# Patient Record
Sex: FEMALE | Race: WHITE | HISPANIC OR LATINO | Employment: OTHER | URBAN - METROPOLITAN AREA
[De-identification: names, ages, dates, MRNs, and addresses within clinical notes are randomized per-mention and may not be internally consistent; named-entity substitution may affect disease eponyms.]

---

## 2017-04-08 ENCOUNTER — HOSPITAL ENCOUNTER (EMERGENCY)
Facility: HOSPITAL | Age: 62
Discharge: HOME | End: 2017-04-08
Attending: EMERGENCY MEDICINE
Payer: OTHER GOVERNMENT

## 2017-04-08 VITALS
DIASTOLIC BLOOD PRESSURE: 83 MMHG | BODY MASS INDEX: 28.35 KG/M2 | SYSTOLIC BLOOD PRESSURE: 124 MMHG | TEMPERATURE: 98 F | HEIGHT: 63 IN | HEART RATE: 80 BPM | RESPIRATION RATE: 18 BRPM | WEIGHT: 160 LBS | OXYGEN SATURATION: 98 %

## 2017-04-08 DIAGNOSIS — T14.8XXA MUSCLE STRAIN: ICD-10-CM

## 2017-04-08 DIAGNOSIS — T14.8XXA ABRASION: Primary | ICD-10-CM
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
## 2017-04-08 DIAGNOSIS — S40.011A CONTUSION OF RIGHT SHOULDER, INITIAL ENCOUNTER: ICD-10-CM

## 2017-04-08 DIAGNOSIS — S09.90XA HEAD INJURY: ICD-10-CM

## 2017-04-08 PROCEDURE — 12011 RPR F/E/E/N/L/M 2.5 CM/<: CPT

## 2017-04-08 PROCEDURE — 96372 THER/PROPH/DIAG INJ SC/IM: CPT

## 2017-04-08 PROCEDURE — 63600175 PHARM REV CODE 636 W HCPCS: Performed by: EMERGENCY MEDICINE

## 2017-04-08 PROCEDURE — 25000003 PHARM REV CODE 250: Performed by: EMERGENCY MEDICINE

## 2017-04-08 PROCEDURE — 99284 EMERGENCY DEPT VISIT MOD MDM: CPT | Mod: 25

## 2017-04-08 RX ORDER — KETOROLAC TROMETHAMINE 30 MG/ML
30 INJECTION, SOLUTION INTRAMUSCULAR; INTRAVENOUS
Status: COMPLETED | OUTPATIENT
Start: 2017-04-08 | End: 2017-04-08

## 2017-04-08 RX ORDER — LIDOCAINE HYDROCHLORIDE AND EPINEPHRINE 10; 10 MG/ML; UG/ML
10 INJECTION, SOLUTION INFILTRATION; PERINEURAL
Status: DISCONTINUED | OUTPATIENT
Start: 2017-04-08 | End: 2017-04-08 | Stop reason: HOSPADM

## 2017-04-08 RX ORDER — LIDOCAINE AND PRILOCAINE 25; 25 MG/G; MG/G
CREAM TOPICAL
Status: DISCONTINUED | OUTPATIENT
Start: 2017-04-08 | End: 2017-04-08

## 2017-04-08 RX ORDER — CYCLOBENZAPRINE HCL 10 MG
10 TABLET ORAL 3 TIMES DAILY PRN
Qty: 12 TABLET | Refills: 0 | Status: SHIPPED | OUTPATIENT
Start: 2017-04-08 | End: 2017-04-13

## 2017-04-08 RX ADMIN — Medication 3 ML: at 01:04

## 2017-04-08 RX ADMIN — KETOROLAC TROMETHAMINE 30 MG: 30 INJECTION, SOLUTION INTRAMUSCULAR at 01:04

## 2017-04-08 NOTE — DISCHARGE INSTRUCTIONS
Contusiones (moretones)     Un moretón es ky hinchazón con alteración del color de la piel.   Ky contusión es un moretón. Un moretón se forma cuando solange recibe un golpe que no rompe la piel aubrie sí los vasos sanguíneos que están debajo. La aime que se derrama de los vasos rotos provoca enrojecimiento e hinchazón. A medida que ruby, el moretón probablemente se pondrá de varios colores, joseph juno, wendy y amarillo. Holt es normal. El moretón debería desaparecer en 2 o 3 semanas.  Factores que aumentan fernando probabilidades de tener moretones  A meghan todo el dwight le sale un moretón en algún momento, aunque ciertas personas son más propensas a tenerlos que otras. Ya que los vasos sanguíneos se vuelven más frágiles con el paso de la edad, las probabilidades de que salgan moretones aumentan con el envejecimiento. También es más probable que aparezcan moretones en personas que tienen un trastorno de la coagulación, joseph la hemofilia, o que rakesh medicamentos para reducir la coagulación, joseph la aspirina.  Cuándo debe acudir a la millie de emergencias (ER, por fernando siglas en inglés)  Los moretones meghan siempre sanan por jean baptiste cuenta sin necesidad de tratamientos especiales. Aubrie para ciertas personas, un moretón grave puede representar un problema brina. Busque atención médica si usted:  · Tiene un trastorno de la coagulación joseph la hemofilia.  · Tiene cirrosis u otra enfermedad grave del hígado.  · Margot medicamentos anticoagulantes joseph la warfarina (Coumadin).  ¿Qué sucederá en la ER?  Un médico le examinará el moretón y le hará preguntas sobre cualquier problema de jax que usted tenga. En algunos casos se podría hacer ky prueba para determinar si la aime se le coagula normalmente. Puede que necesite otros tratamientos según fernando necesidades.  Visita de control  A veces un moretón empeora en vez de mejorar, y puede aumentar de tamaño e hincharse más. Holt puede suceder cuando el cuerpo retiene ky pequeña acumulación  de aime debajo de la piel (hematoma). En ciertos casos muy raros, podría ser necesario que jean baptiste médico le drene el exceso de aime de la jamey.  Consejo:  Para ayudar a reducir el enrojecimiento y la hinchazón, aplique ky bolsa de hielo o de verduras congeladas sobre un moretón (use un paño quiroga entre el objeto frío y la piel).   Date Last Reviewed: 11/30/2014  © 2548-9993 [employer]. [address] [place] [address], [place] [address]. Todos los derechos reservados. Esta información no pretende sustituir la atención médica profesional. Sólo jean baptiste médico puede diagnosticar y tratar un problema de jax.        Face Laceration: Skin Glue  A laceration is a cut through the skin. A laceration on your face has been closed with skin glue. This is used on cuts that have smooth edges and are not infected. In some cases, a lower layer of skin may be sutured before skin glue is put on. The skin glue closes the cut within a few minutes. It also provides a water-resistant cover. No bandage is needed. Skin glue peels off on its own within 5 to 10 days.     Home care  · Your healthcare provider may prescribe an antibiotic. This is to help prevent infection. Follow all instructions for taking this medicine. Take the medicine every day until it is gone or you are told to stop. You should not have any left over.  · The healthcare provider may prescribe medicines for pain. Follow instructions for taking them.  · Follow the healthcare providers instructions on how to care for the cut.  · Keep the wound clean and dry. You may shower or bathe as usual, but do not use soaps, lotions, or ointments on the wound area. Do not scrub the wound. After bathing, pat the wound dry with a soft towel. Avoid soaking the cut in water.  · Do not scratch, rub, or pick at the film. Do not place tape directly over the film.  · Do not apply liquids (such as peroxide), ointments, or creams to the wound while the film is in place.  · Most facial skin  wounds heal without problems. However, an infection sometimes occurs despite proper treatment. Therefore, watch for the signs of infection listed below.  Follow-up care  Follow up with your health care provider as advised. Stitches should be removed from the face within 5 days. Stitches and staples should be removed from other parts of the body within 7-14 days. If dissolving stitches were used in the mouth, these will fall out or dissolve without the need for removal. If tape closures were used, remove them yourself if they have not fallen off after 7 days. If skin glue was used, the film will fall off by itself in 5-10 days. Notify your healthcare provider if you notice persistent numbness or weakness in the injured hand.  When to seek medical advice  Call your health care provider right away if any of these occur:  · Wound bleeds more than a small amount or bleeding doesn't stop  · Signs of infection:  ¨ Increasing pain in the wound  ¨ Increasing wound redness or swelling  ¨ Pus or bad odor coming from the wound  ¨ Fever of 100.4°F (38.ºC) or as directed by your healthcare provider  · Wound edges re-open  Date Last Reviewed: 6/14/2015  © 4418-5804 [employer]. [address] [place] [address], [place] [address]. All rights reserved. This information is not intended as a substitute for professional medical care. Always follow your healthcare professional's instructions.

## 2017-04-08 NOTE — ED PROVIDER NOTES
"Encounter Date: 2017       History     Chief Complaint   Patient presents with    Fall     fall from standing position, no LOC, lac to right side of face, also c/o of neck, right shoulder pain     Review of patient's allergies indicates:   Allergen Reactions    Erythromycin Other (See Comments)     "dehydration"    Tuna oil Swelling    Morphine Anxiety    Phenergan [promethazine] Anxiety    Sulfa (sulfonamide antibiotics) Anxiety     HPI Comments: 62 Y/O FEMALE PRESENTS TO ED FOR EVALUATION S/P FALL FROM STANDING.  PT TRIPPED ON UNEVEN CONCRETE AND FELL FORWARD ONTO HER RIGHT SIDE HITTING THE RIGHT SIDE OF HER FACE AND RIGHT SHOULDER ON THE CONCRETE. THE FALL WAS WITNESSED BY THE PATIENTS DAUGHTER.   PT REPORTS 10/10 RIGHT SIDED HA.  + NECK PAIN.  + RIGHT HIP PAIN.  PT IS ABLE TO AMBULATE.  NO N/V.  NO LOC.  NO VISUAL OR SPEECH CHANGES.  NO NUMBNESS/WEAKNESS.      The history is provided by the patient. No  was used.     Past Medical History:   Diagnosis Date    Depression     GERD (gastroesophageal reflux disease)      Past Surgical History:   Procedure Laterality Date     SECTION, LOW TRANSVERSE      CORONARY ANGIOPLASTY WITH STENT PLACEMENT      HERNIA REPAIR      HYSTERECTOMY      TONSILLECTOMY       Family History   Problem Relation Age of Onset    Cancer Mother     Heart disease Father      Social History   Substance Use Topics    Smoking status: Never Smoker    Smokeless tobacco: Never Used    Alcohol use No     Review of Systems   Constitutional: Negative for chills and fever.   HENT: Positive for facial swelling. Negative for congestion.    Eyes: Negative for pain and redness.   Respiratory: Negative for cough, chest tightness and shortness of breath.    Cardiovascular: Negative for chest pain and palpitations.   Gastrointestinal: Negative for abdominal pain, nausea and vomiting.   Endocrine: Negative for polydipsia and polyphagia.   Genitourinary: Negative " for difficulty urinating and dysuria.   Musculoskeletal: Positive for neck pain. Negative for back pain.        RIGHT SHOULDER, RIGHT HIP PAIN   Skin: Positive for wound. Negative for pallor.        SKIN AVULSION RIGHT TEMPLE   Allergic/Immunologic: Negative for immunocompromised state.   Neurological: Negative for dizziness, syncope, weakness and numbness.        RIGHT SIDED HA DUE TO FALL   Hematological: Does not bruise/bleed easily.   Psychiatric/Behavioral: Negative for confusion. The patient is not nervous/anxious.    All other systems reviewed and are negative.      Physical Exam   Initial Vitals   BP Pulse Resp Temp SpO2   04/08/17 1232 04/08/17 1232 04/08/17 1232 04/08/17 1232 04/08/17 1232   144/78 94 16 98.4 °F (36.9 °C) 98 %     Physical Exam    Nursing note and vitals reviewed.  Constitutional: She appears well-developed and well-nourished. She is not diaphoretic. No distress.   HENT:   Head: Normocephalic. Not macrocephalic and not microcephalic. Head is with abrasion and with contusion. Head is without raccoon's eyes, without Woodward's sign and without laceration.       Right Ear: No hemotympanum.   Left Ear: No hemotympanum.   Nose: No mucosal edema. No epistaxis.       Mouth/Throat: Oropharynx is clear and moist.   Eyes: Conjunctivae and EOM are normal. Pupils are equal, round, and reactive to light. No scleral icterus.   Neck: Normal range of motion. Neck supple.       Cardiovascular: Normal rate, regular rhythm and normal heart sounds. Exam reveals no gallop and no friction rub.    No murmur heard.  Pulmonary/Chest: Breath sounds normal. No respiratory distress. She has no wheezes. She has no rhonchi. She has no rales. She exhibits no tenderness.   Abdominal: Soft. Bowel sounds are normal. She exhibits no distension. There is no tenderness. There is no rebound and no guarding.   Musculoskeletal: Normal range of motion. She exhibits tenderness. She exhibits no edema.        Right shoulder: She  exhibits tenderness and pain. She exhibits normal range of motion, no bony tenderness, no swelling, no effusion, no deformity, no laceration, no spasm and normal pulse.        Right hip: She exhibits tenderness. She exhibits normal range of motion, normal strength, no bony tenderness, no swelling, no crepitus, no deformity and no laceration.        Arms:       Legs:  Lymphadenopathy:     She has no cervical adenopathy.   Neurological: She is alert and oriented to person, place, and time. She has normal strength. No cranial nerve deficit or sensory deficit.   Skin: Skin is warm and dry. No rash noted. No erythema.   SKIN AVULSION AND ABRASION RIGHT TEMPLE   Psychiatric: She has a normal mood and affect. Her behavior is normal. Judgment and thought content normal.         ED Course   Procedures  Labs Reviewed - No data to display          Medical Decision Making:   Initial Assessment:   60 Y/O F PRESENTS S/P FALL FROM STANDING WITH RIGHT SHOULDER AND HIP PAIN, RIGHT SIDED HA AND RIGHT TEMPORAL FACIAL WOUND, AND NECK PAIN.  PT IS NV INTACT.  NO LOC.    Differential Diagnosis:   DDX: CONTUSION, ABRASION, LACERATION, SUBDURAL HEMATOMA, EPIDURAL HEMATOMA, FRACTURE, DISLOCATION, SPRAIN  Clinical Tests:   Radiological Study: Ordered and Reviewed  ED Management:  PT XRAYS AND CT ARE NEG FOR ACUTE FINDINGS.  DERMABOND APPLIED TO FACIAL WOUND.  TORADOL FOR PAIN.  PT FEELS BETTER.,  SHE WILL BE D/C HOME TO F/U WITH PCP.     Pt counseled on their diagnosis and the importance of following up with PCP.  Pt also cautioned on when to return to ED.  Pt verbalizes understanding of discharge plan and will return to ED immediately if symptoms worsen                     ED Course     Clinical Impression:   The primary encounter diagnosis was Abrasion. Diagnoses of Head injury, Fall, Contusion of right shoulder, initial encounter, and Muscle strain were also pertinent to this visit.    Disposition:   Disposition: Discharged  Condition:  Stable       Shalini Eastman MD  04/08/17 1108

## 2017-04-08 NOTE — ED AVS SNAPSHOT
[facility]  [address] [place] [address] [place]  [place] [address]               Beth Flynn   2017 12:33 PM   ED    Descripción:  Female : 1955   Departamento:  [facility]           Desai Cuidado fue coordinado por:     Provider Role From To    Shalini Eastman MD Attending Provider 17 1234 --      Razón de la zohra     Fall           Diagnósticos de Esta Visita        Comentarios    Abrasion    -  Primario     Head injury         Fall         Contusion of right shoulder, initial encounter         Muscle strain           ED Disposition     Ninguna           Lista de tareas           Información de seguimiento     Realice un seguimiento con:  Aaron Watters MD    Cuándo:  4/10/2017    Especialidad:  Internal Medicine    Información de contacto:    [address] [facility]  [place] [address]  [phone]        Recetas para recoger        Disp Refills Start End    cyclobenzaprine (FLEXERIL) 10 MG tablet 12 tablet 0 2017    Take 1 tablet (10 mg total) by mouth 3 (three) times daily as needed for Muscle spasms. - Oral      [facility]     [facility] [place] [facility] Enfermeras - Asistencia   Enfermeras registradas de [facility] pueden ayudarle a reservar ky zohra, proveer educación para la jax, asesoría clínica, y otros servicios de asesoramiento.   Llame para danisha servicio gratuito a [phone].             Medicamentos           Mensaje sobre Medicamentos     Verificar los cambios y / o adiciones a desai régimen de medicación son los mismos que discutir con desai médico. Si cualquiera de estos cambios o adiciones son incorrectos, por favor notifique a desai proveedor de atención médica.        EMPEZAR a jonah estos medicamentos NUEVOS        Refills    cyclobenzaprine (FLEXERIL) 10 MG tablet 0    Sig: Take 1 tablet (10 mg total) by mouth 3 (three) times daily as needed for Muscle spasms.    Categoría: Print    Vía: Oral      These medications  "were administered today        Dose Freq    ketorolac injection 30 mg 30 mg ED 1 Time    Sig: Inject 30 mg into the muscle ED 1 Time.    Categoría: Normal    Vía: Intramuscular    LETS (lidocaine-epinephrine-tetracaine) 4 %-1:1,000 -0.5 % solution 3 mL 3 mL Once    Sig: Apply 3 mLs topically once.    Categoría: Normal    Vía: Topical (Top)    lidocaine-EPINEPHrine 1%-1:100,000 injection 10 mL 10 mL ED 1 Time    Sig: Inject 10 mLs into the skin ED 1 Time.    Categoría: Normal    Vía: Intradermal           Verifique que la siguiente lista de medicamentos es ky representación exacta de los medicamentos que está tomando actualmente. Si no hay ningunos reportados, la lista puede estar en bowden. Si no es correcta, por favor póngase en contacto con jean baptiste proveedor de atención médica. Lleve esta lista con usted en michael de emergencia.           Medicamentos Actuales     cyclobenzaprine (FLEXERIL) 10 MG tablet Take 1 tablet (10 mg total) by mouth 3 (three) times daily as needed for Muscle spasms.    duloxetine (CYMBALTA) 60 MG capsule Take 60 mg by mouth once daily.    lidocaine-EPINEPHrine 1%-1:100,000 injection 10 mL Inject 10 mLs into the skin ED 1 Time.    lorazepam (ATIVAN) 1 MG tablet Take 1 mg by mouth every evening.    omeprazole (PRILOSEC) 40 MG capsule Take 40 mg by mouth once daily.           Información de referencia clínica           Maureen signos vitales cy     PS Pulso Temperatura Resp [place] Peso    136/74 95 98.4 °F (36.9 °C) (Oral) 18 5' 3" (1.6 m) 72.6 kg (160 lb)    SpO2 BMI (IMC)                97% 28.34 kg/m2          Alergias     A partir del:  4/8/2017           Reacciones    Erythromycin Other (See Comments)    "dehydration"    Tuna Oil Swelling    Morphine Anxiety    Phenergan [Promethazine] Anxiety    Sulfa (Sulfonamide Antibiotics) Anxiety      Vacunas     Administradas en la fecha de la visita:  4/8/2017        None      ED Micro, Lab, POCT     None      ED Imaging Orders     Start Ordered       " Status Ordering Provider    04/08/17 1256 04/08/17 1255  X-Ray Hip 2 View Right  1 time imaging      Final result     04/08/17 1255 04/08/17 1255  X-Ray Shoulder Complete 2 View Right  1 time imaging      Final result     04/08/17 1254 04/08/17 1255  CT Head Without Contrast  1 time imaging      Final result     04/08/17 1254 04/08/17 1255  X-Ray Cervical Spine AP And Lateral  1 time imaging      Final result         Instrucciones a nelson de ludy         Contusiones (moretones)     Un moretón es ky hinchazón con alteración del color de la piel.   Ky contusión es un moretón. Un moretón se forma cuando solange recibe un golpe que no rompe la piel aubrie sí los vasos sanguíneos que están debajo. La aime que se derrama de los vasos rotos provoca enrojecimiento e hinchazón. A medida que ruby, el moretón probablemente se pondrá de varios colores, joseph juno, wendy y amarillo. [place] es normal. El moretón debería desaparecer en 2 o 3 semanas.  Factores que aumentan fernando probabilidades de tener moretones  A meghan todo el dwight le sale un moretón en algún momento, aunque ciertas personas son más propensas a tenerlos que otras. Ya que los vasos sanguíneos se vuelven más frágiles con el paso de la edad, las probabilidades de que salgan moretones aumentan con el envejecimiento. También es más probable que aparezcan moretones en personas que tienen un trastorno de la coagulación, joseph la hemofilia, o que rakesh medicamentos para reducir la coagulación, joseph la aspirina.  Cuándo debe acudir a la millie de emergencias (ER, por fernando siglas en inglés)  Los moretones meghan siempre sanan por jean baptiste cuenta sin necesidad de tratamientos especiales. Aubrie para ciertas personas, un moretón grave puede representar un problema brina. Busque atención médica si usted:  · Tiene un trastorno de la coagulación joseph la hemofilia.  · Tiene cirrosis u otra enfermedad grave del hígado.  · Margot medicamentos anticoagulantes joseph la warfarina (Coumadin).  ¿Qué sucederá  en la ER?  Un médico le examinará el moretón y le hará preguntas sobre cualquier problema de jax que usted tenga. En algunos casos se podría hacer ky prueba para determinar si la aime se le coagula normalmente. Puede que necesite otros tratamientos según fernando necesidades.  Visita de control  A veces un brittney empeora en vez de mejorar, y puede aumentar de tamaño e hincharse más. [place] puede suceder cuando el cuerpo retiene ky pequeña acumulación de aime debajo de la piel (hematoma). En ciertos casos muy raros, podría ser necesario que jean baptiste médico le drene el exceso de aime de la jamey.  Consejo:  Para ayudar a reducir el enrojecimiento y la hinchazón, aplique ky bolsa de hielo o de verduras congeladas sobre un brittney (use un paño quiroga entre el objeto frío y la piel).   Date Last Reviewed: 11/30/2014  © 9778-6542 [employer]. [address] [place] [address]. Todos los derechos reservados. Esta información no pretende sustituir la atención médica profesional. Sólo jean baptiste médico puede diagnosticar y tratar un problema de jax.        Face Laceration: Skin Glue  A laceration is a cut through the skin. A laceration on your face has been closed with skin glue. This is used on cuts that have smooth edges and are not infected. In some cases, a lower layer of skin may be sutured before skin glue is put on. The skin glue closes the cut within a few minutes. It also provides a water-resistant cover. No bandage is needed. Skin glue peels off on its own within 5 to 10 days.     Home care  · Your healthcare provider may prescribe an antibiotic. This is to help prevent infection. Follow all instructions for taking this medicine. Take the medicine every day until it is gone or you are told to stop. You should not have any left over.  · The healthcare provider may prescribe medicines for pain. Follow instructions for taking them.  · Follow the healthcare providers instructions on how to care for the  cut.  · Keep the wound clean and dry. You may shower or bathe as usual, but do not use soaps, lotions, or ointments on the wound area. Do not scrub the wound. After bathing, pat the wound dry with a soft towel. Avoid soaking the cut in water.  · Do not scratch, rub, or pick at the film. Do not place tape directly over the film.  · Do not apply liquids (such as peroxide), ointments, or creams to the wound while the film is in place.  · Most facial skin wounds heal without problems. However, an infection sometimes occurs despite proper treatment. Therefore, watch for the signs of infection listed below.  Follow-up care  Follow up with your health care provider as advised. Stitches should be removed from the face within 5 days. Stitches and staples should be removed from other parts of the body within 7-14 days. If dissolving stitches were used in the mouth, these will fall out or dissolve without the need for removal. If tape closures were used, remove them yourself if they have not fallen off after 7 days. If skin glue was used, the film will fall off by itself in 5-10 days. Notify your healthcare provider if you notice persistent numbness or weakness in the injured hand.  When to seek medical advice  Call your health care provider right away if any of these occur:  · Wound bleeds more than a small amount or bleeding doesn't stop  · Signs of infection:  ¨ Increasing pain in the wound  ¨ Increasing wound redness or swelling  ¨ Pus or bad odor coming from the wound  ¨ Fever of 100.4°F (38.ºC) or as directed by your healthcare provider  · Wound edges re-open  Date Last Reviewed: 6/14/2015  © 3792-9695 [employer]. [address] [place] [address], [place] [address]. All rights reserved. This information is not intended as a substitute for professional medical care. Always follow your healthcare professional's instructions.          Registrarse para [facility]     La activación de jean baptiste cuenta [facility] es woods fácil joseph  1-2-3!    1) Ir a [email or web address], seleccione Registrarse Ahora, meter el código de activación y jean baptiste fecha de nacimiento, y seleccione Próximo.    [id number]  Expires: 2017  2:55 PM      2) Crear un nombre de usuario y contraseña para usar cuando se visita [facility] en el futuro y selecciona ky pregunta de seguridad en michael de que pierda jean baptiste contraseña y seleccione Próximo.    3) Introduzca jean baptiste dirección de correo electrónico y fay clic en Registrarse!    Información Adicional  Si tiene alguna pregunta, por favor, e-mail [email or web address] o llame al [phone] para hablar con nuestro personal. Recuerde, [facility] no debe ser usada para necesidades urgentes. En michael de emergencia médica, llame al 911.         [facility] cumple con las leyes federales aplicables de derechos civiles y no discrimina por motivos de eduardo, color, origen nacional, edad, discapacidad, o sexo.        Language Assistance Services     ATTENTION: Language assistance services are available, free of charge. Please call [phone].      ATENCIÓN: Si habla español, tiene a jean baptiste disposición servicios gratuitos de asistencia lingüística. Llame al [phone].     [facility] Ý: N?u b?n nói Ti?ng Vi?t, có các d?ch v? h? tr? ngôn ng? mi?n phí dành cho b?n. G?i s? [phone].                      [facility] [address]  [place] [address]               Beth Flynn   2017 12:33 PM   ED    Description:  Female : 1955   Department:  [facility]           Your Care was Coordinated By:     Provider Role From To    Shalini Eastman MD Attending Provider 17 1234 --      Reason for Visit     Fall           Diagnoses this Visit        Comments    Abrasion    -  Primary     Head injury         Fall         Contusion of right shoulder, initial encounter         Muscle strain           ED Disposition     None           To Do List           Follow-up  Information     Follow up with Aaron Watters MD In 2 days.    Specialty:  Internal Medicine    Contact information:    [address] [facility]  [place] [id number] [phone]         These Medications        Disp Refills Start End    cyclobenzaprine (FLEXERIL) 10 MG tablet 12 tablet 0 4/8/2017 4/13/2017    Take 1 tablet (10 mg total) by mouth 3 (three) times daily as needed for Muscle spasms. - Oral      [facility]     [facility] On Call Nurse Care Line - 24/7 Assistance  Unless otherwise directed by your provider, please contact [facility], our nurse care line that is available for 24/7 assistance.     Registered nurses in the [facility] provide: appointment scheduling, clinical advisement, health education, and other advisory services.  Call: [phone] (toll free)               Medications           Message regarding Medications     Verify the changes and/or additions to your medication regime listed below are the same as discussed with your clinician today.  If any of these changes or additions are incorrect, please notify your healthcare provider.        START taking these NEW medications        Refills    cyclobenzaprine (FLEXERIL) 10 MG tablet 0    Sig: Take 1 tablet (10 mg total) by mouth 3 (three) times daily as needed for Muscle spasms.    Class: Print    Route: Oral      These medications were administered today        Dose Freq    ketorolac injection 30 mg 30 mg ED 1 Time    Sig: Inject 30 mg into the muscle ED 1 Time.    Class: Normal    Route: Intramuscular    LETS (lidocaine-epinephrine-tetracaine) 4 %-1:1,000 -0.5 % solution 3 mL 3 mL Once    Sig: Apply 3 mLs topically once.    Class: Normal    Route: Topical (Top)    lidocaine-EPINEPHrine 1%-1:100,000 injection 10 mL 10 mL ED 1 Time    Sig: Inject 10 mLs into the skin ED 1 Time.    Class: Normal    Route: Intradermal           Verify that the below list of medications is an accurate representation of the medications you are  "currently taking.  If none reported, the list may be blank. If incorrect, please contact your healthcare provider. Carry this list with you in case of emergency.           Current Medications     cyclobenzaprine (FLEXERIL) 10 MG tablet Take 1 tablet (10 mg total) by mouth 3 (three) times daily as needed for Muscle spasms.    duloxetine (CYMBALTA) 60 MG capsule Take 60 mg by mouth once daily.    lidocaine-EPINEPHrine 1%-1:100,000 injection 10 mL Inject 10 mLs into the skin ED 1 Time.    lorazepam (ATIVAN) 1 MG tablet Take 1 mg by mouth every evening.    omeprazole (PRILOSEC) 40 MG capsule Take 40 mg by mouth once daily.           Clinical Reference Information           Your Vitals Were     BP Pulse Temp Resp Height Weight    136/74 95 98.4 °F (36.9 °C) (Oral) 18 5' 3" (1.6 m) 72.6 kg (160 lb)    SpO2 BMI                97% 28.34 kg/m2          Allergies as of 4/8/2017        Reactions    Erythromycin Other (See Comments)    "dehydration"    Tuna Oil Swelling    Morphine Anxiety    Phenergan [Promethazine] Anxiety    Sulfa (Sulfonamide Antibiotics) Anxiety      Immunizations Administered on Date of Encounter - 4/8/2017     None      ED Micro, Lab, POCT     None      ED Imaging Orders     Start Ordered       Status Ordering Provider    04/08/17 1256 04/08/17 1255  X-Ray Hip 2 View Right  1 time imaging      Final result     04/08/17 1255 04/08/17 1255  X-Ray Shoulder Complete 2 View Right  1 time imaging      Final result     04/08/17 1254 04/08/17 1255  CT Head Without Contrast  1 time imaging      Final result     04/08/17 1254 04/08/17 1255  X-Ray Cervical Spine AP And Lateral  1 time imaging      Final result         Discharge Instructions         Contusiones (moretones)     Un moretón es ky hinchazón con alteración del color de la piel.   Ky contusión es un moretón. Un moretón se forma cuando solange recibe un golpe que no rompe la piel melida sí los vasos sanguíneos que están debajo. La aime que se derrama de los " vasos rotos provoca enrojecimiento e hinchazón. A medida que ruby, el moretón probablemente se pondrá de varios colores, joseph juno, wendy y amarillo. [place] es normal. El moretón debería desaparecer en 2 o 3 semanas.  Factores que aumentan fernando probabilidades de tener moretones  A meghan todo el dwight le sale un moretón en algún momento, aunque ciertas personas son más propensas a tenerlos que otras. Ya que los vasos sanguíneos se vuelven más frágiles con el paso de la edad, las probabilidades de que salgan moretones aumentan con el envejecimiento. También es más probable que aparezcan moretones en personas que tienen un trastorno de la coagulación, joseph la hemofilia, o que rakesh medicamentos para reducir la coagulación, joseph la aspirina.  Cuándo debe acudir a la millie de emergencias (ER, por fernando siglas en inglés)  Los moretones meghan siempre sanan por jean baptiste cuenta sin necesidad de tratamientos especiales. Aubrie para ciertas personas, un moretón grave puede representar un problema brina. Busque atención médica si usted:  · Tiene un trastorno de la coagulación joseph la hemofilia.  · Tiene cirrosis u otra enfermedad grave del hígado.  · Margot medicamentos anticoagulantes joseph la warfarina (Coumadin).  ¿Qué sucederá en la ER?  Un médico le examinará el moretón y le hará preguntas sobre cualquier problema de jax que usted tenga. En algunos casos se podría hacer ky prueba para determinar si la aime se le coagula normalmente. Puede que necesite otros tratamientos según fernando necesidades.  Visita de control  A veces un moretón empeora en vez de mejorar, y puede aumentar de tamaño e hincharse más. Tina puede suceder cuando el cuerpo retiene ky pequeña acumulación de aime debajo de la piel (hematoma). En ciertos casos muy raros, podría ser necesario que jean baptiste médico le drene el exceso de aime de la jamey.  Consejo:  Para ayudar a reducir el enrojecimiento y la hinchazón, aplique ky bolsa de hielo o de verduras congeladas sobre un  brittney (use un paño quiroga entre el objeto frío y la piel).   Date Last Reviewed: 11/30/2014  © 0800-2531 [employer]. [address] [place] [address], [place] [address]. Todos los derechos reservados. Esta información no pretende sustituir la atención médica profesional. Sólo jean baptiste médico puede diagnosticar y tratar un problema de jax.        Face Laceration: Skin Glue  A laceration is a cut through the skin. A laceration on your face has been closed with skin glue. This is used on cuts that have smooth edges and are not infected. In some cases, a lower layer of skin may be sutured before skin glue is put on. The skin glue closes the cut within a few minutes. It also provides a water-resistant cover. No bandage is needed. Skin glue peels off on its own within 5 to 10 days.     Home care  · Your healthcare provider may prescribe an antibiotic. This is to help prevent infection. Follow all instructions for taking this medicine. Take the medicine every day until it is gone or you are told to stop. You should not have any left over.  · The healthcare provider may prescribe medicines for pain. Follow instructions for taking them.  · Follow the healthcare providers instructions on how to care for the cut.  · Keep the wound clean and dry. You may shower or bathe as usual, but do not use soaps, lotions, or ointments on the wound area. Do not scrub the wound. After bathing, pat the wound dry with a soft towel. Avoid soaking the cut in water.  · Do not scratch, rub, or pick at the film. Do not place tape directly over the film.  · Do not apply liquids (such as peroxide), ointments, or creams to the wound while the film is in place.  · Most facial skin wounds heal without problems. However, an infection sometimes occurs despite proper treatment. Therefore, watch for the signs of infection listed below.  Follow-up care  Follow up with your health care provider as advised. Stitches should be removed from the face within  5 days. Stitches and staples should be removed from other parts of the body within 7-14 days. If dissolving stitches were used in the mouth, these will fall out or dissolve without the need for removal. If tape closures were used, remove them yourself if they have not fallen off after 7 days. If skin glue was used, the film will fall off by itself in 5-10 days. Notify your healthcare provider if you notice persistent numbness or weakness in the injured hand.  When to seek medical advice  Call your health care provider right away if any of these occur:  · Wound bleeds more than a small amount or bleeding doesn't stop  · Signs of infection:  ¨ Increasing pain in the wound  ¨ Increasing wound redness or swelling  ¨ Pus or bad odor coming from the wound  ¨ Fever of 100.4°F (38.ºC) or as directed by your healthcare provider  · Wound edges re-open  Date Last Reviewed: 6/14/2015  © 0029-2926 [employer]. [address] [place] [address]. All rights reserved. This information is not intended as a substitute for professional medical care. Always follow your healthcare professional's instructions.          [facility] Sign-Up     Activating your [facility] account is as easy as 1-2-3!     1) Visit [email or web address], select Sign Up Now, enter this activation code and your date of birth, then select Next.  [id number]  Expires: 5/23/2017  2:55 PM      2) Create a username and password to use when you visit [facility] in the future and select a security question in case you lose your password and select Next.    3) Enter your e-mail address and click Sign Up!    Additional Information  If you have questions, please e-mail [email or web address] or call [phone] to talk to our [facility] staff. Remember, [facility] is NOT to be used for urgent needs. For medical emergencies, dial 911.          [facility] complies with applicable Federal civil rights laws and does not discriminate on the  basis of race, color, national origin, age, disability, or sex.        Language Assistance Services     ATTENTION: Language assistance services are available, free of charge. Please call [phone].      ATENCIÓN: Si habla ayesha, tiene a jean baptiste disposición servicios gratuitos de asistencia lingüística. Llame al [phone].     CHÚ Ý: N?u b?n nói Ti?ng Vi?t, có các d?ch v? h? tr? ngôn ng? mi?n phí dành cho b?n. G?i s? [phone].

## 2017-04-08 NOTE — ED NOTES
Pt presents to ED via EMS with c/o fall at [facility] when she tripped and fell over uneven concrete. Pt hit the right side of her face. Abrasion noted to right shoulder and small laceration along with redness noted to right side of face. Pt rates pain 9/10. No broken or missing teeth. No hematoma noted to head. Denies LOC. Usually ambulates with cane at home. NAD noted on arrival. Pt AAO.

## 2018-12-13 ENCOUNTER — HOSPITAL ENCOUNTER (EMERGENCY)
Facility: HOSPITAL | Age: 63
Discharge: HOME | End: 2018-12-13
Attending: EMERGENCY MEDICINE
Payer: MEDICAID

## 2018-12-13 VITALS
HEART RATE: 105 BPM | RESPIRATION RATE: 20 BRPM | DIASTOLIC BLOOD PRESSURE: 83 MMHG | OXYGEN SATURATION: 99 % | TEMPERATURE: 98 F | SYSTOLIC BLOOD PRESSURE: 137 MMHG

## 2018-12-13 DIAGNOSIS — R07.9 CHEST PAIN: ICD-10-CM

## 2018-12-13 LAB
ALBUMIN SERPL BCP-MCNC: 4 G/DL
ALP SERPL-CCNC: 67 U/L
ALT SERPL W/O P-5'-P-CCNC: 14 U/L
ANION GAP SERPL CALC-SCNC: 14 MMOL/L
AST SERPL-CCNC: 17 U/L
BASOPHILS # BLD AUTO: 0.01 K/UL
BASOPHILS NFR BLD: 0.1 %
BILIRUB SERPL-MCNC: 0.4 MG/DL
BUN SERPL-MCNC: 10 MG/DL
CALCIUM SERPL-MCNC: 9.8 MG/DL
CHLORIDE SERPL-SCNC: 105 MMOL/L
CK SERPL-CCNC: 65 U/L
CO2 SERPL-SCNC: 21 MMOL/L
CREAT SERPL-MCNC: 0.7 MG/DL
DIFFERENTIAL METHOD: ABNORMAL
EOSINOPHIL # BLD AUTO: 0.1 K/UL
EOSINOPHIL NFR BLD: 0.5 %
ERYTHROCYTE [DISTWIDTH] IN BLOOD BY AUTOMATED COUNT: 12.1 %
EST. GFR  (AFRICAN AMERICAN): >60 ML/MIN/1.73 M^2
EST. GFR  (NON AFRICAN AMERICAN): >60 ML/MIN/1.73 M^2
GLUCOSE SERPL-MCNC: 114 MG/DL
HCT VFR BLD AUTO: 37.3 %
HGB BLD-MCNC: 12.7 G/DL
LYMPHOCYTES # BLD AUTO: 2.2 K/UL
LYMPHOCYTES NFR BLD: 22.3 %
MCH RBC QN AUTO: 29.5 PG
MCHC RBC AUTO-ENTMCNC: 34 G/DL
MCV RBC AUTO: 87 FL
MONOCYTES # BLD AUTO: 0.4 K/UL
MONOCYTES NFR BLD: 3.9 %
NEUTROPHILS # BLD AUTO: 7.3 K/UL
NEUTROPHILS NFR BLD: 72.9 %
PLATELET # BLD AUTO: 276 K/UL
PMV BLD AUTO: 8.7 FL
POTASSIUM SERPL-SCNC: 3.7 MMOL/L
PROT SERPL-MCNC: 6.9 G/DL
RBC # BLD AUTO: 4.31 M/UL
SODIUM SERPL-SCNC: 140 MMOL/L
TROPONIN I SERPL DL<=0.01 NG/ML-MCNC: <0.006 NG/ML
WBC # BLD AUTO: 10.01 K/UL

## 2018-12-13 PROCEDURE — 80053 COMPREHEN METABOLIC PANEL: CPT

## 2018-12-13 PROCEDURE — 93005 ELECTROCARDIOGRAM TRACING: CPT

## 2018-12-13 PROCEDURE — 85025 COMPLETE CBC W/AUTO DIFF WBC: CPT

## 2018-12-13 PROCEDURE — 82550 ASSAY OF CK (CPK): CPT

## 2018-12-13 PROCEDURE — 93010 ELECTROCARDIOGRAM REPORT: CPT | Mod: ,,, | Performed by: INTERNAL MEDICINE

## 2018-12-13 PROCEDURE — 84484 ASSAY OF TROPONIN QUANT: CPT

## 2018-12-13 PROCEDURE — 99285 EMERGENCY DEPT VISIT HI MDM: CPT | Mod: 25

## 2018-12-13 NOTE — ED PROVIDER NOTES
"Encounter Date: 2018    SCRIBE #1 NOTE: I, Brice Moralez, am scribing for, and in the presence of,  Dr. Wright. I have scribed the entire note.       History     Chief Complaint   Patient presents with    Altered Mental Status     pt was brought to ED by  EMS because her family is concerned that she is taking too much of her benzo's. per family pt let a stranger in the house. presently pt is AAOx4, calm, and appropriate.pt reports that the last dose of her klonipin was at 0600.      Beth Flynn is a 63 y.o. female who  has a past medical history of Depression and GERD (gastroesophageal reflux disease).    The patient presents to the ED due to chest pain that began yesterday. The patient states her daughter called EMS due to her complaint of chest pain. She describes the chest pain as constant and dull. She also reports  lower abdominal pain, flank pain in her right side, and shortness of breath from last night. She denies N/V/D or tobacco use. She also denies any recent fall or injury. The patient reports no other symptoms.      The history is provided by the patient.     Review of patient's allergies indicates:   Allergen Reactions    Erythromycin Other (See Comments)     "dehydration"    Tuna oil Swelling    Morphine Anxiety    Phenergan [promethazine] Anxiety    Sulfa (sulfonamide antibiotics) Anxiety     Past Medical History:   Diagnosis Date    Depression     GERD (gastroesophageal reflux disease)      Past Surgical History:   Procedure Laterality Date     SECTION, LOW TRANSVERSE      CORONARY ANGIOPLASTY WITH STENT PLACEMENT      HERNIA REPAIR      HYSTERECTOMY      TONSILLECTOMY       Family History   Problem Relation Age of Onset    Cancer Mother     Heart disease Father      Social History     Tobacco Use    Smoking status: Never Smoker    Smokeless tobacco: Never Used   Substance Use Topics    Alcohol use: No    Drug use: No     Review of Systems   Respiratory: " Positive for shortness of breath.    Cardiovascular: Positive for chest pain.   Gastrointestinal: Positive for abdominal pain.   Genitourinary: Positive for flank pain.       Physical Exam     Initial Vitals [12/13/18 1127]   BP Pulse Resp Temp SpO2   138/82 (!) 118 20 98.9 °F (37.2 °C) 98 %      MAP       --         Physical Exam    Nursing note and vitals reviewed.  Constitutional: She appears well-developed and well-nourished.   HENT:   Head: Normocephalic and atraumatic.   Eyes: Conjunctivae and EOM are normal. Pupils are equal, round, and reactive to light.   Cardiovascular: Normal heart sounds.   No murmur heard.  Tachycardic.   Pulmonary/Chest: Breath sounds normal. She has no wheezes. She has no rhonchi. She has no rales.   Abdominal: Soft. There is tenderness (Across lower abdomen.). There is no rebound and no guarding.   Musculoskeletal: Normal range of motion. She exhibits no edema or tenderness.   Neurological: She is alert and oriented to person, place, and time. She has normal strength.   Skin: Skin is warm and dry.         ED Course   Procedures  Labs Reviewed - No data to display       Imaging Results          X-Ray Chest 1 View (Final result)  Result time 12/13/18 12:36:45    Final result by Jerman Schroeder MD (12/13/18 12:36:45)                 Impression:      No acute radiographic findings on single view of the chest.      Electronically signed by: Jerman Schroeder MD  Date:    12/13/2018  Time:    12:36             Narrative:    EXAMINATION:  XR CHEST 1 VIEW    CLINICAL HISTORY:  chest pain;    TECHNIQUE:  Single frontal view of the chest was performed.    COMPARISON:  01/17/2014    FINDINGS:  Cardiac silhouette is within normal limits.  There is mild aortic atherosclerosis.  Lungs show no evidence of significant focal consolidation, large effusion or pneumothorax.  Hazy density over the left chest likely relates to the left breast.  Bones demonstrate no acute abnormality.  There are postoperative  changes of the thoracolumbar spine.                                 Medical Decision Making:   Initial Assessment:   Beth Flynn 63 y.o. presents to the ED today with chest pain. Pt has various risk factors. Will obtain labs, EKG, CXR to further evaluate. Treat symptoms appropriately and reassess.       Differential Diagnosis:   ACS/MI, PE, aortic dissection, pneumothorax, cardiac tamponade, pericarditis/myocarditis, pneumonia, infection/abscess, lung mass, trauma/fracture, costochondritis/pleurisy, MSK pain/contusion, GERD, biliary disease, pancreatitis, anemia    Clinical Tests:   Lab Tests: Ordered and Reviewed  Radiological Study: Ordered and Reviewed  Medical Tests: Ordered and Reviewed  ED Management:  63-year-old female with chest pain. Workup is unremarkable. I do not feel the etiology is cardiac.  Patient takes benzodiazepines for anxiety, and there was a concern that she was taking too much of this per her family.  Patient patient tells me she takes them as prescribed and does not appear overly intoxicated at this point.  She denies suicidal intent.  She will be discharged in stable condition and advised to follow up with the primary physician as soon as able for recheck and further treatment as warranted.  She will also return here for any further recurring chest pain.                      Clinical Impression:     1. Chest pain                    I, Dr. Marciano Wright, personally performed the services described in this documentation. All medical record entries made by the scribe were at my direction and in my presence. I have reviewed the chart and agree that the record reflects my personal performance and is accurate and complete. Marciano Wright MD.  4:12 PM 12/14/2018                 Marciano Wright MD  12/14/18 [id number]

## 2019-02-01 ENCOUNTER — HOSPITAL ENCOUNTER (OUTPATIENT)
Dept: RADIOLOGY | Facility: HOSPITAL | Age: 64
Discharge: HOME | End: 2019-02-01
Attending: ANESTHESIOLOGY
Payer: MEDICAID

## 2019-02-01 DIAGNOSIS — M47.894 OTHER OSTEOARTHRITIS OF SPINE, THORACIC REGION: ICD-10-CM

## 2019-02-01 DIAGNOSIS — M47.894 OTHER OSTEOARTHRITIS OF SPINE, THORACIC REGION: Primary | ICD-10-CM

## 2019-02-01 PROCEDURE — 72070 X-RAY EXAM THORAC SPINE 2VWS: CPT | Mod: 26,,, | Performed by: RADIOLOGY

## 2019-02-01 PROCEDURE — 72070 XR THORACIC SPINE AP LATERAL: ICD-10-PCS | Mod: 26,,, | Performed by: RADIOLOGY

## 2019-02-01 PROCEDURE — 72070 X-RAY EXAM THORAC SPINE 2VWS: CPT | Mod: TC,FY

## 2019-04-17 ENCOUNTER — HOSPITAL ENCOUNTER (INPATIENT)
Facility: HOSPITAL | Age: 64
LOS: 5 days | Discharge: HOME | DRG: 871 | End: 2019-04-22
Attending: EMERGENCY MEDICINE | Admitting: INTERNAL MEDICINE
Payer: MEDICAID

## 2019-04-17 DIAGNOSIS — R31.9 URINARY TRACT INFECTION WITH HEMATURIA, SITE UNSPECIFIED: ICD-10-CM

## 2019-04-17 DIAGNOSIS — G93.40 ACUTE ENCEPHALOPATHY: ICD-10-CM

## 2019-04-17 DIAGNOSIS — R41.82 ALTERED MENTAL STATUS: ICD-10-CM

## 2019-04-17 DIAGNOSIS — R19.7 ACUTE DIARRHEA: ICD-10-CM

## 2019-04-17 DIAGNOSIS — A41.9 SEPSIS DUE TO URINARY TRACT INFECTION: Primary | ICD-10-CM

## 2019-04-17 DIAGNOSIS — Z00.8 MEDICAL CLEARANCE FOR PSYCHIATRIC ADMISSION: ICD-10-CM

## 2019-04-17 DIAGNOSIS — N39.0 SEPSIS DUE TO URINARY TRACT INFECTION: Primary | ICD-10-CM

## 2019-04-17 DIAGNOSIS — N39.0 URINARY TRACT INFECTION WITH HEMATURIA, SITE UNSPECIFIED: ICD-10-CM

## 2019-04-17 LAB
ALBUMIN SERPL BCP-MCNC: 3.2 G/DL (ref 3.5–5.2)
ALP SERPL-CCNC: 113 U/L (ref 55–135)
ALT SERPL W/O P-5'-P-CCNC: 24 U/L (ref 10–44)
ANION GAP SERPL CALC-SCNC: 12 MMOL/L (ref 8–16)
APAP SERPL-MCNC: 4 UG/ML (ref 10–20)
AST SERPL-CCNC: 22 U/L (ref 10–40)
BACTERIA #/AREA URNS HPF: ABNORMAL /HPF
BASOPHILS # BLD AUTO: 0.01 K/UL (ref 0–0.2)
BASOPHILS NFR BLD: 0.1 % (ref 0–1.9)
BILIRUB SERPL-MCNC: 0.9 MG/DL (ref 0.1–1)
BILIRUB UR QL STRIP: NEGATIVE
BUN SERPL-MCNC: 26 MG/DL (ref 8–23)
CALCIUM SERPL-MCNC: 8.7 MG/DL (ref 8.7–10.5)
CHLORIDE SERPL-SCNC: 97 MMOL/L (ref 95–110)
CLARITY UR: ABNORMAL
CO2 SERPL-SCNC: 23 MMOL/L (ref 23–29)
COLOR UR: YELLOW
CREAT SERPL-MCNC: 1 MG/DL (ref 0.5–1.4)
DIFFERENTIAL METHOD: ABNORMAL
EOSINOPHIL # BLD AUTO: 0 K/UL (ref 0–0.5)
EOSINOPHIL NFR BLD: 0 % (ref 0–8)
ERYTHROCYTE [DISTWIDTH] IN BLOOD BY AUTOMATED COUNT: 12.2 % (ref 11.5–14.5)
EST. GFR  (AFRICAN AMERICAN): >60 ML/MIN/1.73 M^2
EST. GFR  (NON AFRICAN AMERICAN): >60 ML/MIN/1.73 M^2
ETHANOL SERPL-MCNC: <10 MG/DL
GLUCOSE SERPL-MCNC: 192 MG/DL (ref 70–110)
GLUCOSE UR QL STRIP: NEGATIVE
HCT VFR BLD AUTO: 37 % (ref 37–48.5)
HGB BLD-MCNC: 12.6 G/DL (ref 12–16)
HGB UR QL STRIP: ABNORMAL
HYALINE CASTS #/AREA URNS LPF: 0 /LPF
KETONES UR QL STRIP: NEGATIVE
LEUKOCYTE ESTERASE UR QL STRIP: ABNORMAL
LYMPHOCYTES # BLD AUTO: 1.4 K/UL (ref 1–4.8)
LYMPHOCYTES NFR BLD: 7.2 % (ref 18–48)
MCH RBC QN AUTO: 29.7 PG (ref 27–31)
MCHC RBC AUTO-ENTMCNC: 34.1 G/DL (ref 32–36)
MCV RBC AUTO: 87 FL (ref 82–98)
MICROSCOPIC COMMENT: ABNORMAL
MONOCYTES # BLD AUTO: 1.3 K/UL (ref 0.3–1)
MONOCYTES NFR BLD: 6.3 % (ref 4–15)
NEUTROPHILS # BLD AUTO: 17.2 K/UL (ref 1.8–7.7)
NEUTROPHILS NFR BLD: 86 % (ref 38–73)
NITRITE UR QL STRIP: POSITIVE
PH UR STRIP: 6 [PH] (ref 5–8)
PLATELET # BLD AUTO: 243 K/UL (ref 150–350)
PMV BLD AUTO: 8.9 FL (ref 9.2–12.9)
POTASSIUM SERPL-SCNC: 4 MMOL/L (ref 3.5–5.1)
PROT SERPL-MCNC: 6.8 G/DL (ref 6–8.4)
PROT UR QL STRIP: ABNORMAL
RBC # BLD AUTO: 4.24 M/UL (ref 4–5.4)
RBC #/AREA URNS HPF: 7 /HPF (ref 0–4)
SALICYLATES SERPL-MCNC: <5 MG/DL (ref 15–30)
SODIUM SERPL-SCNC: 132 MMOL/L (ref 136–145)
SP GR UR STRIP: 1.02 (ref 1–1.03)
SQUAMOUS #/AREA URNS HPF: 2 /HPF
T4 FREE SERPL-MCNC: 1.08 NG/DL (ref 0.71–1.51)
TSH SERPL DL<=0.005 MIU/L-ACNC: 0.35 UIU/ML (ref 0.4–4)
URN SPEC COLLECT METH UR: ABNORMAL
UROBILINOGEN UR STRIP-ACNC: NEGATIVE EU/DL
WBC # BLD AUTO: 19.92 K/UL (ref 3.9–12.7)
WBC #/AREA URNS HPF: >100 /HPF (ref 0–5)
WBC CLUMPS URNS QL MICRO: ABNORMAL

## 2019-04-17 PROCEDURE — 81000 URINALYSIS NONAUTO W/SCOPE: CPT

## 2019-04-17 PROCEDURE — 99291 CRITICAL CARE FIRST HOUR: CPT | Mod: 25

## 2019-04-17 PROCEDURE — 25000003 PHARM REV CODE 250: Performed by: EMERGENCY MEDICINE

## 2019-04-17 PROCEDURE — 87088 URINE BACTERIA CULTURE: CPT

## 2019-04-17 PROCEDURE — 85025 COMPLETE CBC W/AUTO DIFF WBC: CPT

## 2019-04-17 PROCEDURE — 80053 COMPREHEN METABOLIC PANEL: CPT

## 2019-04-17 PROCEDURE — 63600175 PHARM REV CODE 636 W HCPCS: Performed by: EMERGENCY MEDICINE

## 2019-04-17 PROCEDURE — 80307 DRUG TEST PRSMV CHEM ANLYZR: CPT

## 2019-04-17 PROCEDURE — 80320 DRUG SCREEN QUANTALCOHOLS: CPT

## 2019-04-17 PROCEDURE — 93005 ELECTROCARDIOGRAM TRACING: CPT

## 2019-04-17 PROCEDURE — 87186 SC STD MICRODIL/AGAR DIL: CPT

## 2019-04-17 PROCEDURE — 87086 URINE CULTURE/COLONY COUNT: CPT

## 2019-04-17 PROCEDURE — 96375 TX/PRO/DX INJ NEW DRUG ADDON: CPT

## 2019-04-17 PROCEDURE — 84439 ASSAY OF FREE THYROXINE: CPT

## 2019-04-17 PROCEDURE — 12000002 HC ACUTE/MED SURGE SEMI-PRIVATE ROOM

## 2019-04-17 PROCEDURE — 96361 HYDRATE IV INFUSION ADD-ON: CPT

## 2019-04-17 PROCEDURE — 84443 ASSAY THYROID STIM HORMONE: CPT

## 2019-04-17 PROCEDURE — 87077 CULTURE AEROBIC IDENTIFY: CPT

## 2019-04-17 PROCEDURE — 80329 ANALGESICS NON-OPIOID 1 OR 2: CPT

## 2019-04-17 RX ORDER — NALOXONE HCL 0.4 MG/ML
0.4 VIAL (ML) INJECTION
Status: COMPLETED | OUTPATIENT
Start: 2019-04-17 | End: 2019-04-17

## 2019-04-17 RX ADMIN — NALOXONE HYDROCHLORIDE 0.4 MG: 0.4 INJECTION, SOLUTION INTRAMUSCULAR; INTRAVENOUS; SUBCUTANEOUS at 09:04

## 2019-04-17 RX ADMIN — SODIUM CHLORIDE 1000 ML: 0.9 INJECTION, SOLUTION INTRAVENOUS at 09:04

## 2019-04-18 PROBLEM — R31.9 URINARY TRACT INFECTION WITH HEMATURIA: Status: ACTIVE | Noted: 2019-04-18

## 2019-04-18 PROBLEM — N39.0 URINARY TRACT INFECTION WITH HEMATURIA: Status: ACTIVE | Noted: 2019-04-18

## 2019-04-18 PROBLEM — A41.9 SEPSIS DUE TO URINARY TRACT INFECTION: Status: ACTIVE | Noted: 2019-04-18

## 2019-04-18 PROBLEM — N39.0 SEPSIS DUE TO URINARY TRACT INFECTION: Status: ACTIVE | Noted: 2019-04-18

## 2019-04-18 PROBLEM — G93.40 ACUTE ENCEPHALOPATHY: Status: ACTIVE | Noted: 2019-04-18

## 2019-04-18 LAB
ALBUMIN SERPL BCP-MCNC: 2.8 G/DL (ref 3.5–5.2)
ALP SERPL-CCNC: 116 U/L (ref 55–135)
ALT SERPL W/O P-5'-P-CCNC: 23 U/L (ref 10–44)
AMPHET+METHAMPHET UR QL: NEGATIVE
ANION GAP SERPL CALC-SCNC: 11 MMOL/L (ref 8–16)
AST SERPL-CCNC: 20 U/L (ref 10–40)
BARBITURATES UR QL SCN>200 NG/ML: NEGATIVE
BASOPHILS # BLD AUTO: 0.01 K/UL (ref 0–0.2)
BASOPHILS NFR BLD: 0.1 % (ref 0–1.9)
BENZODIAZ UR QL SCN>200 NG/ML: NEGATIVE
BILIRUB SERPL-MCNC: 0.7 MG/DL (ref 0.1–1)
BUN SERPL-MCNC: 21 MG/DL (ref 8–23)
BZE UR QL SCN: NEGATIVE
CALCIUM SERPL-MCNC: 8.7 MG/DL (ref 8.7–10.5)
CANNABINOIDS UR QL SCN: NEGATIVE
CHLORIDE SERPL-SCNC: 100 MMOL/L (ref 95–110)
CO2 SERPL-SCNC: 22 MMOL/L (ref 23–29)
CREAT SERPL-MCNC: 0.8 MG/DL (ref 0.5–1.4)
CREAT UR-MCNC: 91.3 MG/DL (ref 15–325)
DIFFERENTIAL METHOD: ABNORMAL
EOSINOPHIL # BLD AUTO: 0 K/UL (ref 0–0.5)
EOSINOPHIL NFR BLD: 0 % (ref 0–8)
ERYTHROCYTE [DISTWIDTH] IN BLOOD BY AUTOMATED COUNT: 12.1 % (ref 11.5–14.5)
EST. GFR  (AFRICAN AMERICAN): >60 ML/MIN/1.73 M^2
EST. GFR  (NON AFRICAN AMERICAN): >60 ML/MIN/1.73 M^2
ESTIMATED AVG GLUCOSE: 137 MG/DL (ref 68–131)
GLUCOSE SERPL-MCNC: 190 MG/DL (ref 70–110)
HBA1C MFR BLD HPLC: 6.4 % (ref 4–5.6)
HCT VFR BLD AUTO: 35.2 % (ref 37–48.5)
HGB BLD-MCNC: 11.8 G/DL (ref 12–16)
LACTATE SERPL-SCNC: 0.9 MMOL/L (ref 0.5–2.2)
LACTATE SERPL-SCNC: 2.3 MMOL/L (ref 0.5–2.2)
LACTATE SERPL-SCNC: 2.3 MMOL/L (ref 0.5–2.2)
LYMPHOCYTES # BLD AUTO: 0.8 K/UL (ref 1–4.8)
LYMPHOCYTES NFR BLD: 4.4 % (ref 18–48)
MCH RBC QN AUTO: 29.1 PG (ref 27–31)
MCHC RBC AUTO-ENTMCNC: 33.5 G/DL (ref 32–36)
MCV RBC AUTO: 87 FL (ref 82–98)
METHADONE UR QL SCN>300 NG/ML: NEGATIVE
MONOCYTES # BLD AUTO: 1 K/UL (ref 0.3–1)
MONOCYTES NFR BLD: 5.9 % (ref 4–15)
NEUTROPHILS # BLD AUTO: 15.6 K/UL (ref 1.8–7.7)
NEUTROPHILS NFR BLD: 89.2 % (ref 38–73)
OPIATES UR QL SCN: NORMAL
PCP UR QL SCN>25 NG/ML: NEGATIVE
PLATELET # BLD AUTO: 211 K/UL (ref 150–350)
PMV BLD AUTO: 8.8 FL (ref 9.2–12.9)
POCT GLUCOSE: 131 MG/DL (ref 70–110)
POCT GLUCOSE: 157 MG/DL (ref 70–110)
POCT GLUCOSE: 164 MG/DL (ref 70–110)
POCT GLUCOSE: 189 MG/DL (ref 70–110)
POTASSIUM SERPL-SCNC: 3.6 MMOL/L (ref 3.5–5.1)
PROCALCITONIN SERPL IA-MCNC: 1.51 NG/ML
PROT SERPL-MCNC: 6.9 G/DL (ref 6–8.4)
RBC # BLD AUTO: 4.05 M/UL (ref 4–5.4)
SODIUM SERPL-SCNC: 133 MMOL/L (ref 136–145)
TOXICOLOGY INFORMATION: NORMAL
WBC # BLD AUTO: 17.54 K/UL (ref 3.9–12.7)

## 2019-04-18 PROCEDURE — 96376 TX/PRO/DX INJ SAME DRUG ADON: CPT

## 2019-04-18 PROCEDURE — 84145 PROCALCITONIN (PCT): CPT

## 2019-04-18 PROCEDURE — 83605 ASSAY OF LACTIC ACID: CPT | Mod: 91

## 2019-04-18 PROCEDURE — 83036 HEMOGLOBIN GLYCOSYLATED A1C: CPT

## 2019-04-18 PROCEDURE — 25000003 PHARM REV CODE 250: Performed by: STUDENT IN AN ORGANIZED HEALTH CARE EDUCATION/TRAINING PROGRAM

## 2019-04-18 PROCEDURE — 96361 HYDRATE IV INFUSION ADD-ON: CPT

## 2019-04-18 PROCEDURE — 63600175 PHARM REV CODE 636 W HCPCS: Performed by: INTERNAL MEDICINE

## 2019-04-18 PROCEDURE — 87040 BLOOD CULTURE FOR BACTERIA: CPT | Mod: 59

## 2019-04-18 PROCEDURE — 83605 ASSAY OF LACTIC ACID: CPT

## 2019-04-18 PROCEDURE — 27000221 HC OXYGEN, UP TO 24 HOURS

## 2019-04-18 PROCEDURE — 97161 PT EVAL LOW COMPLEX 20 MIN: CPT

## 2019-04-18 PROCEDURE — 63600175 PHARM REV CODE 636 W HCPCS: Performed by: EMERGENCY MEDICINE

## 2019-04-18 PROCEDURE — 97165 OT EVAL LOW COMPLEX 30 MIN: CPT

## 2019-04-18 PROCEDURE — 94761 N-INVAS EAR/PLS OXIMETRY MLT: CPT

## 2019-04-18 PROCEDURE — 87077 CULTURE AEROBIC IDENTIFY: CPT

## 2019-04-18 PROCEDURE — 96365 THER/PROPH/DIAG IV INF INIT: CPT

## 2019-04-18 PROCEDURE — 87186 SC STD MICRODIL/AGAR DIL: CPT

## 2019-04-18 PROCEDURE — 36415 COLL VENOUS BLD VENIPUNCTURE: CPT

## 2019-04-18 PROCEDURE — 80053 COMPREHEN METABOLIC PANEL: CPT

## 2019-04-18 PROCEDURE — 96372 THER/PROPH/DIAG INJ SC/IM: CPT

## 2019-04-18 PROCEDURE — 11000001 HC ACUTE MED/SURG PRIVATE ROOM

## 2019-04-18 PROCEDURE — 85025 COMPLETE CBC W/AUTO DIFF WBC: CPT

## 2019-04-18 RX ORDER — SODIUM CHLORIDE 9 MG/ML
INJECTION, SOLUTION INTRAVENOUS CONTINUOUS
Status: ACTIVE | OUTPATIENT
Start: 2019-04-18 | End: 2019-04-18

## 2019-04-18 RX ORDER — DEXTROSE 50 % IN WATER (D50W) INTRAVENOUS SYRINGE
25
Status: DISCONTINUED | OUTPATIENT
Start: 2019-04-18 | End: 2019-04-22 | Stop reason: HOSPADM

## 2019-04-18 RX ORDER — IBUPROFEN 200 MG
16 TABLET ORAL
Status: DISCONTINUED | OUTPATIENT
Start: 2019-04-18 | End: 2019-04-22 | Stop reason: HOSPADM

## 2019-04-18 RX ORDER — GLUCAGON 1 MG
1 KIT INJECTION
Status: DISCONTINUED | OUTPATIENT
Start: 2019-04-18 | End: 2019-04-22 | Stop reason: HOSPADM

## 2019-04-18 RX ORDER — ACETAMINOPHEN 650 MG/1
650 SUPPOSITORY RECTAL ONCE
Status: COMPLETED | OUTPATIENT
Start: 2019-04-18 | End: 2019-04-18

## 2019-04-18 RX ORDER — NALOXONE HCL 0.4 MG/ML
0.4 VIAL (ML) INJECTION ONCE
Status: COMPLETED | OUTPATIENT
Start: 2019-04-18 | End: 2019-04-18

## 2019-04-18 RX ORDER — SODIUM CHLORIDE 9 MG/ML
INJECTION, SOLUTION INTRAVENOUS CONTINUOUS
Status: DISCONTINUED | OUTPATIENT
Start: 2019-04-18 | End: 2019-04-18

## 2019-04-18 RX ORDER — SODIUM CHLORIDE 0.9 % (FLUSH) 0.9 %
10 SYRINGE (ML) INJECTION
Status: DISCONTINUED | OUTPATIENT
Start: 2019-04-18 | End: 2019-04-22 | Stop reason: HOSPADM

## 2019-04-18 RX ORDER — HEPARIN SODIUM 5000 [USP'U]/ML
5000 INJECTION, SOLUTION INTRAVENOUS; SUBCUTANEOUS EVERY 8 HOURS
Status: DISCONTINUED | OUTPATIENT
Start: 2019-04-18 | End: 2019-04-22 | Stop reason: HOSPADM

## 2019-04-18 RX ORDER — IBUPROFEN 200 MG
24 TABLET ORAL
Status: DISCONTINUED | OUTPATIENT
Start: 2019-04-18 | End: 2019-04-22 | Stop reason: HOSPADM

## 2019-04-18 RX ORDER — DEXTROSE 50 % IN WATER (D50W) INTRAVENOUS SYRINGE
12.5
Status: DISCONTINUED | OUTPATIENT
Start: 2019-04-18 | End: 2019-04-22 | Stop reason: HOSPADM

## 2019-04-18 RX ADMIN — NALOXONE HYDROCHLORIDE 0.4 MG: 0.4 INJECTION, SOLUTION INTRAMUSCULAR; INTRAVENOUS; SUBCUTANEOUS at 01:04

## 2019-04-18 RX ADMIN — HEPARIN SODIUM 5000 UNITS: 5000 INJECTION, SOLUTION INTRAVENOUS; SUBCUTANEOUS at 03:04

## 2019-04-18 RX ADMIN — ACETAMINOPHEN 650 MG: 650 SUPPOSITORY RECTAL at 02:04

## 2019-04-18 RX ADMIN — CEFTRIAXONE 1 G: 1 INJECTION, SOLUTION INTRAVENOUS at 12:04

## 2019-04-18 RX ADMIN — SODIUM CHLORIDE: 0.9 INJECTION, SOLUTION INTRAVENOUS at 03:04

## 2019-04-18 RX ADMIN — HEPARIN SODIUM 5000 UNITS: 5000 INJECTION, SOLUTION INTRAVENOUS; SUBCUTANEOUS at 10:04

## 2019-04-18 RX ADMIN — HEPARIN SODIUM 5000 UNITS: 5000 INJECTION, SOLUTION INTRAVENOUS; SUBCUTANEOUS at 06:04

## 2019-04-18 NOTE — ED NOTES
One set of Blood Cultures obtained after several attempts.  Lab unsuccessful and will send another tech when possible.

## 2019-04-18 NOTE — PLAN OF CARE
Visit to room with pt, not oriented at this time.  Pt somnolent.  Call placed to daughter eMgan who lives in [place], able to provide information.  States pt is independent with ADL's, uses no DME.  Pt's daughter Dacia lives next door and according to Megan is able to assist if needed.  Attempted to reach Dacia by phone, message left.      Plan to remain inpatient to complete IV antibiotics and address altered mental status.  Will cont to follow for any needs.  Dian Guzman provided with this CM's contact number.      Discharge planning brochure provided. White board updated with CM name & contact info.  Pt's daughter encouraged to call with any questions or needs. CM will continue to follow patient throughout the transitions of care, and assist with any discharge needs.       04/18/19 1125   Discharge Assessment   Assessment Type Discharge Planning Assessment   Confirmed/corrected address and phone number on facesheet? Yes   Assessment information obtained from? Medical Record   Expected Length of Stay (days) 3   Prior to hospitilization cognitive status: Alert/Oriented   Prior to hospitalization functional status: Independent   Current cognitive status: Not Oriented to Place;Not Oriented to Time;Not Oriented to Person   Lives With alone  (daughter lives next door to pt.)   Able to Return to Prior Arrangements yes   Readmission Within the Last 30 Days no previous admission in last 30 days   Patient currently being followed by outpatient case management? No   Patient currently receives any other outside agency services? No   Equipment Currently Used at Home none   Do you have any problems affording any of your prescribed medications? No   Discharge Plan A Home Health   Discharge Plan B Home with family   DME Needed Upon Discharge    (TBD)   Patient/Family in Agreement with Plan yes       Gayle Tripp RN    [phone]

## 2019-04-18 NOTE — ED PROVIDER NOTES
"Encounter Date: 2019    SCRIBE #1 NOTE: I, Abbie Valdez, am scribing for, and in the presence of,  Dr. Cormier. I have scribed the entire note.       History     Chief Complaint   Patient presents with    Altered Mental Status     To ER with slurred speech and altered mental status.  Family states that she may have overdosed on medication.  Pt states that she took too much and denies SI but is unsure of what she took.     This is a 63 y.o. female with PMHx of Diabetes Mellitus, HTN, and chronic lower back pain who presents to the Emergency Department due to AMS approximately 1 hour ago. Per granddaughter, the patient appeared to be sleeping a lot today and groggy all day without much improvement; she states patient is oriented x4 and able to walk without assistance of walker at baseline. She reports the patient was found this morning by aunt with an unmarked bottle of pills next to patient. Granddaughter reports patient may have had an accidental overdose on Vicodin and Clonidine. Per granddaughter, the patient has no history of depression; however, according to medical chart indicates patient with history of depression. Granddaughter reports patient was recently admitted to psychiatry at  in 2018.    The history is provided by a relative (granddaughter).     Review of patient's allergies indicates:   Allergen Reactions    Erythromycin Other (See Comments)     "dehydration"    Tuna oil Swelling    Morphine Anxiety    Phenergan [promethazine] Anxiety    Sulfa (sulfonamide antibiotics) Anxiety     Past Medical History:   Diagnosis Date    Depression     GERD (gastroesophageal reflux disease)      Past Surgical History:   Procedure Laterality Date     SECTION, LOW TRANSVERSE      CORONARY ANGIOPLASTY WITH STENT PLACEMENT      HERNIA REPAIR      HYSTERECTOMY      TONSILLECTOMY       Family History   Problem Relation Age of Onset    Cancer Mother     Heart disease Father      Social " History     Tobacco Use    Smoking status: Never Smoker    Smokeless tobacco: Never Used   Substance Use Topics    Alcohol use: No    Drug use: No     Review of Systems   Unable to perform ROS: Mental status change       Physical Exam     Initial Vitals [04/17/19 2028]   BP Pulse Resp Temp SpO2   130/70 92 18 98.2 °F (36.8 °C) (!) 92 %      MAP       --         Physical Exam    Nursing note and vitals reviewed.  Constitutional: She appears well-developed and well-nourished.   HENT:   Head: Normocephalic and atraumatic.   Dry mucous membrane   Eyes: EOM are normal. Pupils are equal, round, and reactive to light.   Neck: Normal range of motion. Neck supple.   Cardiovascular: Normal rate, regular rhythm, normal heart sounds and intact distal pulses. Exam reveals no gallop and no friction rub.    No murmur heard.  Pulmonary/Chest: Breath sounds normal. No respiratory distress.   Abdominal: Soft. Bowel sounds are normal. There is no tenderness.   Musculoskeletal: Normal range of motion. She exhibits no tenderness.   Neurological: She has normal strength.   Somnolent, but easily aroused  Oriented x2   Skin: Skin is warm and dry. Capillary refill takes less than 2 seconds.         ED Course   Critical Care  Date/Time: 4/18/2019 6:33 PM  Performed by: Jose Miguel Cormier MD  Authorized by: Dylan Farris MD   Total critical care time (exclusive of procedural time) : 35 minutes  Critical care was necessary to treat or prevent imminent or life-threatening deterioration of the following conditions: sepsis.        Labs Reviewed   CBC W/ AUTO DIFFERENTIAL - Abnormal; Notable for the following components:       Result Value    WBC 19.92 (*)     MPV 8.9 (*)     Gran # (ANC) 17.2 (*)     Mono # 1.3 (*)     Gran% 86.0 (*)     Lymph% 7.2 (*)     All other components within normal limits   COMPREHENSIVE METABOLIC PANEL - Abnormal; Notable for the following components:    Sodium 132 (*)     Glucose 192 (*)     BUN, Bld 26 (*)      Albumin 3.2 (*)     All other components within normal limits    Narrative:     Slightly hemolyzed.   TSH - Abnormal; Notable for the following components:    TSH 0.355 (*)     All other components within normal limits   ACETAMINOPHEN LEVEL - Abnormal; Notable for the following components:    Acetaminophen (Tylenol), Serum 4.0 (*)     All other components within normal limits   SALICYLATE LEVEL - Abnormal; Notable for the following components:    Salicylate Lvl <5.0 (*)     All other components within normal limits   ALCOHOL,MEDICAL (ETHANOL)   T4, FREE   URINALYSIS, REFLEX TO URINE CULTURE   DRUG SCREEN PANEL, URINE EMERGENCY   URINALYSIS MICROSCOPIC     EKG Readings: (Independently Interpreted)   20:41 Sinus Tachycardia at rate of 104 bpm, non-specific T wave abnormality       Imaging Results          CT Head Without Contrast (Final result)  Result time 04/17/19 21:36:01    Final result by Genet Mayorga MD (04/17/19 21:36:01)                 Impression:      No acute intracranial abnormality detected.      Electronically signed by: Genet Mayorga  Date:    04/17/2019  Time:    21:36             Narrative:    EXAMINATION:  CT OF THE HEAD WITHOUT    CLINICAL HISTORY:  Confusion/delirium, altered LOC, unexplained;    TECHNIQUE:  5 mm unenhanced axial images were obtained from the skull base to the vertex.    COMPARISON:  04/08/2017    FINDINGS:  The ventricles, basal cisterns, and cortical sulci are within normal limits for patient's stated age. There is no acute intracranial hemorrhage, territorial infarct or mass effect, or midline shift. In the visualized paranasal sinuses, there is mild mucoperiosteal thickening seen in bilateral ethmoid air cells and in the left maxillary sinus.  There is a mucous retention cyst or polyp seen in the right maxillary sinus.                               X-Ray Chest AP Portable (Final result)  Result time 04/17/19 21:20:51    Final result by Gerard Modi MD (04/17/19  21:20:51)                 Impression:      1. Hypoventilatory exam accentuates the interstitial attenuation, differential would include congestive change/early edema.  Correlation advised.  Obscuration of the left costophrenic angle is likely on the basis of overlying soft tissue, pleural fluid felt less likely.      Electronically signed by: Gerard Modi MD  Date:    04/17/2019  Time:    21:20             Narrative:    EXAMINATION:  XR CHEST AP PORTABLE    CLINICAL HISTORY:  Altered mental status, unspecified    TECHNIQUE:  Single frontal view of the chest was performed.    COMPARISON:  12/13/2018    FINDINGS:  The cardiomediastinal silhouette is prominent, magnified by technique noting calcification of the aorta..  There is slight obscuration of the left costophrenic angle, likely related to overlying soft tissue, underlying effusion however not excluded..  The trachea is midline.  The lungs are symmetrically expanded bilaterally with patchy increased interstitial and parenchymal attenuation bilaterally..  No large focal consolidation seen.  There is no pneumothorax.  The osseous structures are remarkable for degenerative changes noting partially visualized thoracolumbar fusion hardware..                                 Medical Decision Making:   Initial Assessment:   This is a 63 y.o. female with PMHx of Diabetes Mellitus, HTN, and chronic lower back pain who presents to the Emergency Department due to AMS approximately 1 hour ago.  Independently Interpreted Test(s):   I have ordered and independently interpreted EKG Reading(s) - see prior notes  Clinical Tests:   Lab Tests: Reviewed and Ordered  Radiological Study: Ordered and Reviewed  Medical Tests: Ordered and Reviewed  ED Management:  12:00 AM Patient showed improvement following administration of Narcan. Patient became more alert. UA revealing evidence of infection now returned at 23:21 at which time patient now fulfills criteria for sepsis.  IV Rocephin  has been ordered, Patient already received 1L of fluids. MAP has never been below 70. Lactic acid level pending.     12:03 AM Case discussed with [facility] on-call resident who will accept patient for admission. Acute encephalopathy secondary to severe sepsis and/or polypharmacy/overdose.                       Clinical Impression:       ICD-10-CM ICD-9-CM   1. Sepsis due to urinary tract infection A41.9 038.9    N39.0 995.91     599.0     Z00.8 V70.8   3. Altered mental status R41.82 780.97   4. Urinary tract infection with hematuria, site unspecified N39.0 599.0    R31.9 599.70   5. Acute encephalopathy G93.40 348.30        I, Dr. Jose Miguel Cormier, personally performed the services described in this documentation. All medical record entries made by the scribe were at my direction and in my presence.  I have reviewed the chart and agree that the record reflects my personal performance and is accurate and complete.                     Jose Miguel Cormier MD  04/18/19 2722

## 2019-04-18 NOTE — ED NOTES
Lab informed patient is going up to floor and they still need to get another blood culture. Tech given patient's room number.

## 2019-04-18 NOTE — ED NOTES
Patient identifiers for Beth Flynn checked and correct.  LOC: Drowsy the patient is oriented to person and place.  APPEARANCE: Patient resting comfortably patient is clean and well groomed, patient's clothing are properly fastened.  SKIN: The skin is warm and dry, patient has normal skin turgor and moist mucus membranes.  MUSKULOSKELETAL: Patient moving all extremities well, no obvious swelling or deformities noted.  RESPIRATORY: Airway is open and patent, respirations are spontaneous, patient has a normal effort and rate.  CARDIAC: Tachycardic no periphreal edema noted, capillary refill < 3 seconds.  ABDOMEN: Soft and non tender to palpation, no distention noted.  NEUROLOGIC: PERRL, facial expression is symmetrical, bilateral hand grasp equal and even, normal sensation in all extremities when touched with a finger.

## 2019-04-18 NOTE — NURSING
Nurse unable to complete admission questions at this time due to the patient's confusion. Her mentation has improved but she is still unable to answer all questions appropriately. Nurse also used an  on the language line and patient still showed signs of confusion. Will try at a later time.

## 2019-04-18 NOTE — ED NOTES
Lab called to draw blood cultures. Attempted x 1 unsuccesfully.   Telephone Encounter by Shavonne Frederick at 02/05/18 10:18 AM     Author:  Shavonne Frederick Service:  (none) Author Type:       Filed:  02/05/18 10:20 AM Encounter Date:  2/2/2018 Status:  Signed     :  Shavonne Frederick ()            Left message on cell phone for patient to call back press option 1 x2.    Patient can be scheduled in frozen work in slot for Tubal Ligation consult and annual.  Frozen slots on 2/14, 2/26 or 3/8.    DO NOT DOUBLE BOOK[AH1.1M]      Revision History        User Key Date/Time User Provider Type Action    > AH1.1 02/05/18 10:20 AM Shavonne Frederick  Sign    M - Manual

## 2019-04-18 NOTE — ED NOTES
Spoke with Gladys at [facility].  Recommendations are as follows: monitoring of RR, BP, Cardiac monitoring,  Start IV fluids,  Pressors for BP if needed,  Atropine as needed.  Drug screen is needed for suspected SI. Keep hydrated and treat for symptoms.

## 2019-04-18 NOTE — ED NOTES
Contact numbers: Katherine  [phone]                                                                    [place] [phone]

## 2019-04-18 NOTE — H&P
[facility] Medicine H&P Note     Admitting Team: [facility] Hospitalist Team B  Attending Physician: Dylan Farris MD  Resident: Sondra  Intern: Shaun     Date of Admit: 4/17/2019    Chief Complaint     Altered Mental Status for one hour.    Subjective:      History of Present Illness:  Beth Flynn is a 63 y.o. female who  has a past medical history of Depression and GERD (gastroesophageal reflux disease). The patient presented to [facility] on 4/17/2019 with a primary complaint of Altered Mental Status for one hour.    The patient is very somnolent on interview and not able to provide much history. Patient was brought in by granddaughter, Megan Flynn, but she is no longer at bedside and unable to be reached by phone. Majority of history is gathered from chart and ED documentation.    According to ED note:  Per granddaughter, patient was in her USOH - oriented x 4, independent in ADLs, ambulates without walker or assistance at baseline - until morning of arrival, when she noticed that the patient was groggy and sleeping more than usual. Granddaughter reported that her aunt found the patient sleeping next to an unmarked pill bottle and brought the patient into ED with concern for an accidental overdose of Vicodin and Clonidine. Patient has a documented PMHx of diagnosed depression. Patient was somnolent but easily aroused and oriented x 2. She was given narcan x 1 and showed some improvement.     On my interview, patient is still very somnolent and in and out of consciousness but answering questions appropriately and oriented to self, place, and month. Although, she is unable to tell me why she was brought into hospital. She denies taking any medication tonight or any SI. She states she has had a sore throat and has been hoarse for 4 days. She also endorses fever and chills the past 2 days along with increased frequency and burning with urination. She denies any cough but reports  "intermittent SOB for the last few days. She denies any skin rashes or breakdown that she has noticed. She denies any abdominal pain, fullness, chest pain, palpitations, nausea, diarrhea, or constipation.      Past Medical History:  Past Medical History:   Diagnosis Date    Depression     GERD (gastroesophageal reflux disease)        Past Surgical History:  Past Surgical History:   Procedure Laterality Date     SECTION, LOW TRANSVERSE      CORONARY ANGIOPLASTY WITH STENT PLACEMENT      HERNIA REPAIR      HYSTERECTOMY      TONSILLECTOMY         Allergies:  Review of patient's allergies indicates:   Allergen Reactions    Erythromycin Other (See Comments)     "dehydration"    Tuna oil Swelling    Morphine Anxiety    Phenergan [promethazine] Anxiety    Sulfa (sulfonamide antibiotics) Anxiety       Home Medications:  Prior to Admission medications    Medication Sig Start Date End Date Taking? Authorizing Provider   duloxetine (CYMBALTA) 60 MG capsule Take 60 mg by mouth once daily.    Historical Provider, MD   lorazepam (ATIVAN) 1 MG tablet Take 1 mg by mouth every evening.    Historical Provider, MD   omeprazole (PRILOSEC) 40 MG capsule Take 40 mg by mouth once daily.    Historical Provider, MD       Family History:  Family History   Problem Relation Age of Onset    Cancer Mother     Heart disease Father        Social History:  Social History     Tobacco Use    Smoking status: Never Smoker    Smokeless tobacco: Never Used   Substance Use Topics    Alcohol use: No    Drug use: No       Review of Systems:  Pertinent items are noted in HPI. All other systems are reviewed and are negative.    Health Maintaince :   Primary Care Physician: Dr. Watters, possibly Dr. Kumar currently.    Immunizations:   Unable to obtain    Cancer Screening:  Unable to obtain     Objective:   Last 24 Hour Vital Signs:  BP  Min: 102/60  Max: 130/70  Temp  Av.9 °F (36.6 °C)  Min: 97.6 °F (36.4 °C)  Max: 98.2 °F (36.8 " "°C)  Pulse  Av.2  Min: 91  Max: 97  Resp  Av.8  Min: 18  Max: 24  SpO2  Av %  Min: 92 %  Max: 95 %  Height  Av' 3" (160 cm)  Min: 5' 3" (160 cm)  Max: 5' 3" (160 cm)  Weight  Av.6 kg (160 lb)  Min: 72.6 kg (160 lb)  Max: 72.6 kg (160 lb)  Body mass index is 28.34 kg/m².  No intake/output data recorded.    Physical Examination:  General: no acute distress, calm, somnolent  Head: normocephalic, atraumatic   Eyes: PERRL, anicteric sclera, clear conjunctiva  Ears, Nose, Throat: no rhinorrhea, oropharynx clear without erythema or exudate, dry mucous membranes   Neck: supple without pain or stiffness, no carotid bruits, no JVD  Cardiovascular: tachycardic, regular rhythm, audible S1/S2 without murmurs, rubs, or extra heart sounds, radial, DP, and PT pulses 2+ and symmetric  Pulmonary: CTAB, normal work of breathing without accessory muscle use, symmetric chest rise  Abdomen: soft, non-tender, non-distended, no rebound or guarding, normoactive BS  MSKL: full passive and active ROM, no edema   Lymphatic: no LAD appreciated  Neurologic: somnolent but answering questions appropriately, oriented to self, place, and month; moves all extremities spontaneously    Laboratory:  Most Recent Data:  CBC:   Lab Results   Component Value Date    WBC 19.92 (H) 2019    HGB 12.6 2019    HCT 37.0 2019     2019    MCV 87 2019    RDW 12.2 2019     WBC Differential: 86 % N, 0 % Bands, 7.2 % L, 6.3 % M, 0 % Eo, 0.1 % Baso    BMP:   Lab Results   Component Value Date     (L) 2019    K 4.0 2019    CL 97 2019    CO2 23 2019    BUN 26 (H) 2019    CREATININE 1.0 2019     (H) 2019    CALCIUM 8.7 2019     LFTs:   Lab Results   Component Value Date    PROT 6.8 2019    ALBUMIN 3.2 (L) 2019    BILITOT 0.9 2019    AST 22 2019    ALKPHOS 113 2019    ALT 24 2019     Coags: No results found for: " INR, PROTIME, PTT  FLP: No results found for: CHOL, HDL, LDLCALC, TRIG, CHOLHDL  DM:   Lab Results   Component Value Date    CREATININE 1.0 04/17/2019     Thyroid:   Lab Results   Component Value Date    TSH 0.355 (L) 04/17/2019    FREET4 1.08 04/17/2019     Anemia: No results found for: IRON, TIBC, FERRITIN, IAWMPOOP46, FOLATE  Cardiac:   Lab Results   Component Value Date    TROPONINI <0.006 12/13/2018     Urinalysis:   Lab Results   Component Value Date    COLORU Yellow 04/17/2019    SPECGRAV 1.020 04/17/2019    NITRITE Positive (A) 04/17/2019    KETONESU Negative 04/17/2019    UROBILINOGEN Negative 04/17/2019    WBCUA >100 (H) 04/17/2019       Trended Lab Data:  Recent Labs   Lab 04/17/19  2138   WBC 19.92*   HGB 12.6   HCT 37.0      MCV 87   RDW 12.2   *   K 4.0   CL 97   CO2 23   BUN 26*   CREATININE 1.0   *   PROT 6.8   ALBUMIN 3.2*   BILITOT 0.9   AST 22   ALKPHOS 113   ALT 24       Trended Cardiac Data:  No results for input(s): TROPONINI, CKTOTAL, CKMB, BNP in the last 168 hours.    Microbiology Data:  4/18  UCx, BCx pending    Other Results:  EKG (my interpretation):   Sinus tachycardia with HR of 104    Radiology:  Imaging Results          CT Head Without Contrast (Final result)  Result time 04/17/19 21:36:01    Final result by Genet Mayorga MD (04/17/19 21:36:01)                 Impression:      No acute intracranial abnormality detected.      Electronically signed by: Genet Mayorga  Date:    04/17/2019  Time:    21:36             Narrative:    EXAMINATION:  CT OF THE HEAD WITHOUT    CLINICAL HISTORY:  Confusion/delirium, altered LOC, unexplained;    TECHNIQUE:  5 mm unenhanced axial images were obtained from the skull base to the vertex.    COMPARISON:  04/08/2017    FINDINGS:  The ventricles, basal cisterns, and cortical sulci are within normal limits for patient's stated age. There is no acute intracranial hemorrhage, territorial infarct or mass effect, or midline shift.  In the visualized paranasal sinuses, there is mild mucoperiosteal thickening seen in bilateral ethmoid air cells and in the left maxillary sinus.  There is a mucous retention cyst or polyp seen in the right maxillary sinus.                               X-Ray Chest AP Portable (Final result)  Result time 04/17/19 21:20:51    Final result by Gerard Modi MD (04/17/19 21:20:51)                 Impression:      1. Hypoventilatory exam accentuates the interstitial attenuation, differential would include congestive change/early edema.  Correlation advised.  Obscuration of the left costophrenic angle is likely on the basis of overlying soft tissue, pleural fluid felt less likely.      Electronically signed by: Gerard Modi MD  Date:    04/17/2019  Time:    21:20             Narrative:    EXAMINATION:  XR CHEST AP PORTABLE    CLINICAL HISTORY:  Altered mental status, unspecified    TECHNIQUE:  Single frontal view of the chest was performed.    COMPARISON:  12/13/2018    FINDINGS:  The cardiomediastinal silhouette is prominent, magnified by technique noting calcification of the aorta..  There is slight obscuration of the left costophrenic angle, likely related to overlying soft tissue, underlying effusion however not excluded..  The trachea is midline.  The lungs are symmetrically expanded bilaterally with patchy increased interstitial and parenchymal attenuation bilaterally..  No large focal consolidation seen.  There is no pneumothorax.  The osseous structures are remarkable for degenerative changes noting partially visualized thoracolumbar fusion hardware..                                 Assessment:     Beth Flynn is a 63 year old female, with PMHx of depression and GERD, who was brought into ED by family for somnolence and confusion for one day. She has been admitted to floor and being treated for sepsis secondary to UTI.     Plan:     Severe sepsis 2/2 to urinary tract infection  Patient brought into  ED with one day of drowsiness and one hour of somnolence/confusion. GCS of 15 on arrival. Patient endorsing subjective F/C and dysuria for 2 days. SIRS: HR, RR, WBC, LA  - On admission, afebrile, HR 92, RR 24  - WBC 19.9k, procal 1.51, Lactate 2.3  - UA with many bacteria, WBC >100, Nitrite +   - Blood and urine cultures ordered (1 set BCx before abx, 1 set after)  - Given 1L NS bolus and rocephin x 1 in ED   - admit to floor  - continue rocephin 1g daily and f/u cultures  - trend lactate    Acute encephalopathy 2/2 sepsis vs polypharmacy/overdose  Unable to reach collateral, but per ED note: 1 day of increased drowsiness and one hour of somnolence and confusion. Found next to unlabeled, empty pill bottle. CT head without acute abnormality.  - etiology of AMS likely multifactorial including sepsis and polypharmacy  - Sepsis workup and treatment as above  - family expressed concern for possible overdose  home meds include aripiprazole, benztropine, duloxetine, mirtazapine, propranalol and hydroxizine  - Utox positive for opiates, otherwise negative  Minimal improvement with Narcan x 2   - contacted [place] at poison control, will monitor for s/s of serotonin syndrome and anti-cholinergic toxicity   - neuro checks q4h    Depression  - per chart review  - pt denying suicidal thoughts or intent in ED  - will attempt to contact collateral in AM to gather more information regarding need for psychiatric evaluation  - holding home meds for now    Hyperglycemia  - cbg 192 on arrival  no documented HgA1c in chart  - A1c ordered     Hyponatremia, mild  - Na+ 132 on admission  - 1L NS bolus in ED  - daily CMP    HCM  - A1c, lipid panel, TSH pending    Code: Full  Diet: NPO   PPX: SQH    Dispo: pending urosepsis resolution and psychiatric evaluation.     Lenin Méndez MD  [facility] HO-1    [facility] Hospitalist Pager numbers:   [facility] Hospitalist Medicine Team A (Madhavi/Faby): [phone]  [facility] Hospitalist  Medicine Team B (Kaleigh/Rojelio):  [phone]

## 2019-04-18 NOTE — PT/OT/SLP EVAL
Physical Therapy Evaluation    Patient Name:  Beth Flynn   MRN:  [id number]    Recommendations:     Discharge Recommendations:  home   Discharge Equipment Recommendations: walker, rolling, shower chair   Barriers to discharge: None    Assessment:     Beth Flynn is a 63 y.o. female admitted with a medical diagnosis of Acute encephalopathy.  She presents with the following impairments/functional limitations:  impaired functional mobilty, impaired endurance, weakness . Patient able to ambulate with RW ~ 160 ft. .    Rehab Prognosis: Good; patient would benefit from acute skilled PT services to address these deficits and reach maximum level of function.    Recent Surgery: * No surgery found *      Plan:     During this hospitalization, patient to be seen 6 x/week to address the identified rehab impairments via gait training, therapeutic activities, therapeutic exercises and progress toward the following goals:    · Plan of Care Expires:  05/18/19    Subjective     Chief Complaint: weakness  Patient/Family Comments/goals: go home  Pain/Comfort:  · Pain Rating 1: 0/10  · Pain Rating Post-Intervention 1: 0/10    Patients cultural, spiritual, Holiness conflicts given the current situation:      Living Environment:  Lives with [facility] on 2nd floor  No concerns  Prior to admission, patients level of function was independent.  Equipment used at home: none.  DME owned (not currently used): none.  Upon discharge, patient will have assistance from family  .    Objective:     Communicated with primary nurse prior to session.  Patient found HOB elevated with peripheral IV, bed alarm  upon PT entry to room.    General Precautions: Standard, fall   Orthopedic Precautions:N/A   Braces: N/A     Exams:  · RLE ROM: WFL  · RLE Strength: WFL  · LLE ROM: WFL  · LLE Strength: WFL    Functional Mobility:  · Bed Mobility:     · Supine to Sit: stand by assistance  · Sit to Supine: stand by assistance  · Transfers:     · Sit to  Stand:  stand by assistance with rolling walker  · Gait: 160 ft with RW and SBA to CG assist  · Balance: fair + with RW      Therapeutic Activities and Exercises:   na    AM-PAC 6 CLICK MOBILITY  Total Score:22     Patient left HOB elevated with all lines intact, call button in reach and bed alarm on.    GOALS:   Multidisciplinary Problems     Physical Therapy Goals        Problem: Physical Therapy Goal    Goal Priority Disciplines Outcome Goal Variances Interventions   Physical Therapy Goal     PT, PT/OT Ongoing (interventions implemented as appropriate)     Description:  Goals to be met by: 2019     Patient will increase functional independence with mobility by performin. Supine to sit with [place]  2. Sit to stand transfer with [place]  3. Gait  x >200 feet with [place] using no AD.                       History:     Past Medical History:   Diagnosis Date    Depression     GERD (gastroesophageal reflux disease)        Past Surgical History:   Procedure Laterality Date     SECTION, LOW TRANSVERSE      CORONARY ANGIOPLASTY WITH STENT PLACEMENT      HERNIA REPAIR      HYSTERECTOMY      TONSILLECTOMY         Time Tracking:     PT Received On: 19  PT Start Time: 1428     PT Stop Time: 1442  PT Total Time (min): 14 min     Billable Minutes: Evaluation 14      Jordan Vasquez, PT  2019

## 2019-04-18 NOTE — ED NOTES
Admitting team notified narcan did not  make any difference. Pt awakes with tactile stimuli. Oriented to person and place.
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
## 2019-04-18 NOTE — ED NOTES
Per pharmacy packages pt took Aripirazole 10 mg, Benztropine 1 mg, Duloxetine DR 60 mg, Mirtazapine 30 mg, Propanalol  mg. Took 3 doses of these medications. Unknown number of Hydroxyzine 25 mg.  Denies suicidal ideation.  Pt does have a psych history.

## 2019-04-18 NOTE — PLAN OF CARE
Problem: Adult Inpatient Plan of Care  Goal: Plan of Care Review  Outcome: Ongoing (interventions implemented as appropriate)  Plan of care reviewed with patient. Pt AA0x4 and able to make needs known to staff. Pt  Continues on RA with 0 episodes of SOB noted. Walked and worked with PT and OT today.0 s/sx of hypo/hyperglycemia noted. Pt denies pain when asked. Awake off and on thru the day. 0 falls today. EVELYN at bedside. Verbalizes to staff understanding. NSR on monitor with 0 red alarms noted.  No acute distress observed at this time. Side rails x2, bed in low position, call bell within reach. Bed alarm in place for patient safety. Will relay to oncoming nurse to monitor for changes in condition.

## 2019-04-18 NOTE — PLAN OF CARE
Problem: Physical Therapy Goal  Goal: Physical Therapy Goal  Goals to be met by: 2019     Patient will increase functional independence with mobility by performin. Supine to sit with [place]  2. Sit to stand transfer with [place]  3. Gait  x >200 feet with [place] using no AD.     Outcome: Ongoing (interventions implemented as appropriate)  Recommend Home  May need RW

## 2019-04-18 NOTE — PLAN OF CARE
Problem: Occupational Therapy Goal  Goal: Occupational Therapy Goal  Goals to be met by: 5/18/19     Patient will increase functional independence with ADLs by performing:    LE Dressing with Supervision.  Grooming while standing with Supervision.  Toileting from toilet with Supervision for hygiene and clothing management.   Supine to sit with Supervision.  Step transfer with Supervision  Toilet transfer to toilet with Supervision.  Increased functional strength to WFL for self care skills and functional mobility .  Upper extremity exercise program x10 reps per handout, with independence.    Outcome: Ongoing (interventions implemented as appropriate)  Pt would benefit from cont OT services in order to maximize functional independence. D/c recs most likely home with RW and shower chair. Pt reports fatigue as limiting factor at this time.

## 2019-04-19 LAB
ALBUMIN SERPL BCP-MCNC: 2.5 G/DL (ref 3.5–5.2)
ALP SERPL-CCNC: 113 U/L (ref 55–135)
ALT SERPL W/O P-5'-P-CCNC: 21 U/L (ref 10–44)
ANION GAP SERPL CALC-SCNC: 9 MMOL/L (ref 8–16)
AST SERPL-CCNC: 20 U/L (ref 10–40)
BASOPHILS # BLD AUTO: 0.02 K/UL (ref 0–0.2)
BASOPHILS NFR BLD: 0.2 % (ref 0–1.9)
BILIRUB SERPL-MCNC: 0.4 MG/DL (ref 0.1–1)
BUN SERPL-MCNC: 18 MG/DL (ref 8–23)
CALCIUM SERPL-MCNC: 8.8 MG/DL (ref 8.7–10.5)
CHLORIDE SERPL-SCNC: 104 MMOL/L (ref 95–110)
CO2 SERPL-SCNC: 22 MMOL/L (ref 23–29)
CREAT SERPL-MCNC: 0.7 MG/DL (ref 0.5–1.4)
DIFFERENTIAL METHOD: ABNORMAL
EOSINOPHIL # BLD AUTO: 0 K/UL (ref 0–0.5)
EOSINOPHIL NFR BLD: 0.2 % (ref 0–8)
ERYTHROCYTE [DISTWIDTH] IN BLOOD BY AUTOMATED COUNT: 12.3 % (ref 11.5–14.5)
EST. GFR  (AFRICAN AMERICAN): >60 ML/MIN/1.73 M^2
EST. GFR  (NON AFRICAN AMERICAN): >60 ML/MIN/1.73 M^2
GLUCOSE SERPL-MCNC: 112 MG/DL (ref 70–110)
HCT VFR BLD AUTO: 36.6 % (ref 37–48.5)
HGB BLD-MCNC: 11.9 G/DL (ref 12–16)
LYMPHOCYTES # BLD AUTO: 1.6 K/UL (ref 1–4.8)
LYMPHOCYTES NFR BLD: 13.1 % (ref 18–48)
MCH RBC QN AUTO: 29 PG (ref 27–31)
MCHC RBC AUTO-ENTMCNC: 32.5 G/DL (ref 32–36)
MCV RBC AUTO: 89 FL (ref 82–98)
MONOCYTES # BLD AUTO: 1.1 K/UL (ref 0.3–1)
MONOCYTES NFR BLD: 9 % (ref 4–15)
NEUTROPHILS # BLD AUTO: 9.7 K/UL (ref 1.8–7.7)
NEUTROPHILS NFR BLD: 77.3 % (ref 38–73)
PLATELET # BLD AUTO: 178 K/UL (ref 150–350)
PMV BLD AUTO: 9.5 FL (ref 9.2–12.9)
POCT GLUCOSE: 120 MG/DL (ref 70–110)
POCT GLUCOSE: 153 MG/DL (ref 70–110)
POCT GLUCOSE: 160 MG/DL (ref 70–110)
POCT GLUCOSE: 191 MG/DL (ref 70–110)
POTASSIUM SERPL-SCNC: 3.9 MMOL/L (ref 3.5–5.1)
PROT SERPL-MCNC: 6.7 G/DL (ref 6–8.4)
RBC # BLD AUTO: 4.11 M/UL (ref 4–5.4)
SODIUM SERPL-SCNC: 135 MMOL/L (ref 136–145)
WBC # BLD AUTO: 12.51 K/UL (ref 3.9–12.7)

## 2019-04-19 PROCEDURE — 94761 N-INVAS EAR/PLS OXIMETRY MLT: CPT

## 2019-04-19 PROCEDURE — 80053 COMPREHEN METABOLIC PANEL: CPT

## 2019-04-19 PROCEDURE — 25000003 PHARM REV CODE 250: Performed by: STUDENT IN AN ORGANIZED HEALTH CARE EDUCATION/TRAINING PROGRAM

## 2019-04-19 PROCEDURE — 85025 COMPLETE CBC W/AUTO DIFF WBC: CPT

## 2019-04-19 PROCEDURE — 25000003 PHARM REV CODE 250: Performed by: INTERNAL MEDICINE

## 2019-04-19 PROCEDURE — 87040 BLOOD CULTURE FOR BACTERIA: CPT

## 2019-04-19 PROCEDURE — 27000221 HC OXYGEN, UP TO 24 HOURS

## 2019-04-19 PROCEDURE — 97110 THERAPEUTIC EXERCISES: CPT

## 2019-04-19 PROCEDURE — 63600175 PHARM REV CODE 636 W HCPCS: Performed by: INTERNAL MEDICINE

## 2019-04-19 PROCEDURE — 36415 COLL VENOUS BLD VENIPUNCTURE: CPT

## 2019-04-19 PROCEDURE — 11000001 HC ACUTE MED/SURG PRIVATE ROOM

## 2019-04-19 PROCEDURE — 97116 GAIT TRAINING THERAPY: CPT

## 2019-04-19 RX ORDER — DEXTROSE 50 % IN WATER (D50W) INTRAVENOUS SYRINGE
12.5
Status: DISCONTINUED | OUTPATIENT
Start: 2019-04-19 | End: 2019-04-22 | Stop reason: HOSPADM

## 2019-04-19 RX ORDER — INSULIN ASPART 100 [IU]/ML
0-5 INJECTION, SOLUTION INTRAVENOUS; SUBCUTANEOUS
Status: DISCONTINUED | OUTPATIENT
Start: 2019-04-19 | End: 2019-04-22 | Stop reason: HOSPADM

## 2019-04-19 RX ORDER — SIMVASTATIN 40 MG/1
40 TABLET, FILM COATED ORAL NIGHTLY
Status: DISCONTINUED | OUTPATIENT
Start: 2019-04-19 | End: 2019-04-22 | Stop reason: HOSPADM

## 2019-04-19 RX ORDER — BENZTROPINE MESYLATE 0.5 MG/1
0.5 TABLET ORAL 2 TIMES DAILY
COMMUNITY
End: 2022-04-17 | Stop reason: DRUGHIGH

## 2019-04-19 RX ORDER — SIMVASTATIN 40 MG/1
40 TABLET, FILM COATED ORAL NIGHTLY
COMMUNITY
End: 2022-04-17

## 2019-04-19 RX ORDER — IBUPROFEN 200 MG
24 TABLET ORAL
Status: DISCONTINUED | OUTPATIENT
Start: 2019-04-19 | End: 2019-04-22 | Stop reason: HOSPADM

## 2019-04-19 RX ORDER — FUROSEMIDE 20 MG/1
20 TABLET ORAL DAILY
COMMUNITY
End: 2023-04-13 | Stop reason: CLARIF

## 2019-04-19 RX ORDER — DEXTROSE 50 % IN WATER (D50W) INTRAVENOUS SYRINGE
25
Status: DISCONTINUED | OUTPATIENT
Start: 2019-04-19 | End: 2019-04-22 | Stop reason: HOSPADM

## 2019-04-19 RX ORDER — ACETAMINOPHEN 325 MG/1
650 TABLET ORAL EVERY 6 HOURS PRN
Status: DISCONTINUED | OUTPATIENT
Start: 2019-04-19 | End: 2019-04-22 | Stop reason: HOSPADM

## 2019-04-19 RX ORDER — MELOXICAM 7.5 MG/1
7.5 TABLET ORAL DAILY
Status: ON HOLD | COMMUNITY
End: 2019-04-19 | Stop reason: HOSPADM

## 2019-04-19 RX ORDER — GLUCAGON 1 MG
1 KIT INJECTION
Status: DISCONTINUED | OUTPATIENT
Start: 2019-04-19 | End: 2019-04-22 | Stop reason: HOSPADM

## 2019-04-19 RX ORDER — IBUPROFEN 600 MG/1
600 TABLET ORAL EVERY 6 HOURS PRN
Status: ON HOLD | COMMUNITY
End: 2019-04-19 | Stop reason: HOSPADM

## 2019-04-19 RX ORDER — METFORMIN HYDROCHLORIDE 500 MG/1
500 TABLET ORAL 2 TIMES DAILY WITH MEALS
COMMUNITY

## 2019-04-19 RX ORDER — ARIPIPRAZOLE 10 MG/1
10 TABLET ORAL DAILY
COMMUNITY
End: 2022-04-17

## 2019-04-19 RX ORDER — CLONAZEPAM 1 MG/1
1 TABLET ORAL 2 TIMES DAILY PRN
Status: ON HOLD | COMMUNITY
End: 2019-04-19 | Stop reason: HOSPADM

## 2019-04-19 RX ORDER — IBUPROFEN 200 MG
16 TABLET ORAL
Status: DISCONTINUED | OUTPATIENT
Start: 2019-04-19 | End: 2019-04-22 | Stop reason: HOSPADM

## 2019-04-19 RX ORDER — CYCLOBENZAPRINE HCL 10 MG
10 TABLET ORAL 3 TIMES DAILY PRN
Status: ON HOLD | COMMUNITY
End: 2019-04-19 | Stop reason: HOSPADM

## 2019-04-19 RX ORDER — DONEPEZIL HYDROCHLORIDE 10 MG/1
10 TABLET, FILM COATED ORAL NIGHTLY
COMMUNITY

## 2019-04-19 RX ADMIN — ACETAMINOPHEN 650 MG: 325 TABLET ORAL at 07:04

## 2019-04-19 RX ADMIN — CEFTRIAXONE 1 G: 1 INJECTION, SOLUTION INTRAVENOUS at 12:04

## 2019-04-19 RX ADMIN — HEPARIN SODIUM 5000 UNITS: 5000 INJECTION, SOLUTION INTRAVENOUS; SUBCUTANEOUS at 09:04

## 2019-04-19 RX ADMIN — HEPARIN SODIUM 5000 UNITS: 5000 INJECTION, SOLUTION INTRAVENOUS; SUBCUTANEOUS at 05:04

## 2019-04-19 RX ADMIN — SIMVASTATIN 40 MG: 40 TABLET, FILM COATED ORAL at 09:04

## 2019-04-19 RX ADMIN — HEPARIN SODIUM 5000 UNITS: 5000 INJECTION, SOLUTION INTRAVENOUS; SUBCUTANEOUS at 02:04

## 2019-04-19 NOTE — PT/OT/SLP PROGRESS
Physical Therapy Treatment    Patient Name:  Beth Flynn   MRN:  [id number]    Recommendations:     Discharge Recommendations:  home health PT   Discharge Equipment Recommendations: walker, rolling, shower chair   Barriers to discharge: Inaccessible home    Assessment:     Beth Flynn is a 63 y.o. female admitted with a medical diagnosis of Acute encephalopathy.  She presents with the following impairments/functional limitations:  weakness, impaired endurance, impaired functional mobilty, gait instability, impaired balance. Pt tolerated treatment session well. Pt required increased rest breaks during treatment session. Pt may benefit from HHPT post discharge.     Rehab Prognosis: Good; patient would benefit from acute skilled PT services to address these deficits and reach maximum level of function.    Recent Surgery: * No surgery found *      Plan:     During this hospitalization, patient to be seen 6 x/week to address the identified rehab impairments via gait training, therapeutic activities, therapeutic exercises and progress toward the following goals:    · Plan of Care Expires:  05/18/19    Subjective     Chief Complaint:  L knee pain  Patient/Family Comments/goals: return to PLOF.   Pain/Comfort:  · Pain Rating 1: 8/10  · Location - Side 1: Left  · Location 1: leg  · Pain Addressed 1: Nurse notified  · Pain Rating Post-Intervention 1: (pt did not c/o pain)      Objective:     Communicated with HARESH Deng prior to session.  Patient found supine asleep with bed alarm(EVELYN system) upon PT entry to room.     General Precautions: Standard, fall   Orthopedic Precautions:N/A   Braces: N/A     Functional Mobility:  Bed Mobility:     Scooting: supervision  Supine to Sit: Min A  Sit to Supine: Supervision  Transfers:     Sit to Stand:  Supervision  Gait: Pt ambulated ~200 feet with RW X2 trials with SBA. Pt with rest break between each trial.   Pt ascended/descended 12 stairs with BHR and CGA. Pt ascends with  reciprocal pattern and descends with non-reciprocal pattern leading with LLE.  Balance: Good-       AM-PAC 6 CLICK MOBILITY  Turning over in bed (including adjusting bedclothes, sheets and blankets)?: 4  Sitting down on and standing up from a chair with arms (e.g., wheelchair, bedside commode, etc.): 4  Moving from lying on back to sitting on the side of the bed?: 4  Moving to and from a bed to a chair (including a wheelchair)?: 4  Need to walk in hospital room?: 4  Climbing 3-5 steps with a railing?: 3  Basic Mobility Total Score: 23       Therapeutic Activities and Exercises:  Bed mobility and transfers as listed above.   · Pt performed the following Therapeutic exercises bilaterally to improve strength and functional mobility: 2X20 BLE  · LAQ and seated hip marches.   · Pt performed ambulation and stairs as listed above.   · Pt performed toileting and hygiene with supervision.   Pt was educated on safety during hospital stay and the benefits of sitting up in the chair. PT explained to pt to call nursing before attempting to get OOB. Pt verbalized understanding.   · Patient left supine with HOB elevated with all lines intact, call button in reach, bed alarm on and RN notified.        GOALS:   Multidisciplinary Problems     Physical Therapy Goals        Problem: Physical Therapy Goal    Goal Priority Disciplines Outcome Goal Variances Interventions   Physical Therapy Goal     PT, PT/OT Ongoing (interventions implemented as appropriate)     Description:  Goals to be met by: 2019     Patient will increase functional independence with mobility by performin. Supine to sit with [place]  2. Sit to stand transfer with [place]  3. Gait  x >200 feet with [place] using no AD.                       Time Tracking:     PT Received On: 19  PT Start Time: 1030     PT Stop Time: 1055  PT Total Time (min): 25 min     Billable Minutes: Gait Training 15 and Therapeutic Exercise 10    Treatment Type:  Treatment  PT/PTA: PT     PTA Visit Number: 0     Luc Roman, PT  04/19/2019

## 2019-04-19 NOTE — PLAN OF CARE
Problem: Physical Therapy Goal  Goal: Physical Therapy Goal  Goals to be met by: 2019     Patient will increase functional independence with mobility by performin. Supine to sit with [place]  2. Sit to stand transfer with [place]  3. Gait  x >200 feet with [place] using no AD.      Outcome: Ongoing (interventions implemented as appropriate)  Pt tolerated treatment well and is progressing towards goals.

## 2019-04-19 NOTE — PLAN OF CARE
Problem: Adult Inpatient Plan of Care  Goal: Plan of Care Review  Outcome: Ongoing (interventions implemented as appropriate)  Plan of care reviewed with patient. Pt AA0x4 today. 0 falls noted. Pt continues on 02 with 0 episodes of SOB upon exertion. Worked with PT/OT today. Denies pain when asked. Tolerated all meds today with 0 problems. Verbalizes to staff understanding. NSR on monitor with 0 red alarms noted.  No acute distress observed at this time. Side rails x2, bed in low position, call bell within reach. Bed alarm in place for patient safety. Will relay to oncoming nurse to monitor for changes in condition.

## 2019-04-19 NOTE — PROGRESS NOTES
"[facility] MEDICINE TEAM B PROGRESS NOTE - HO-4  2019  9:09 AM    Subjective   Patient with complaints of "twitching" in her hands and legs. When asked why she came to the hospital, she states because of seizures. I asked her what her seizures look like, and she said it is the twitching. She isn't on seizure medications and states she has never seen a neurologist. She says her cardiologist diagnosed her with seizures.     Objective   BP  Min: 115/65  Max: 148/67  Temp  Av.6 °F (37 °C)  Min: 97.4 °F (36.3 °C)  Max: 100.2 °F (37.9 °C)  Pulse  Av.2  Min: 89  Max: 105  Resp  Av.7  Min: 18  Max: 20  SpO2  Av.3 %  Min: 95 %  Max: 98 %  Body mass index is 28.34 kg/m².  I/O last 3 completed shifts:  In: 800 [P.O.:750; IV Piggyback:50]  Out: 2550 [Urine:2550]    Physical Exam   Constitutional: She is oriented to person, place, and time. She appears well-developed and well-nourished. No distress.   Sleepy but arousable   HENT:   Head: Normocephalic and atraumatic.   Slightly flushed appearing   Eyes: Pupils are equal, round, and reactive to light. Conjunctivae are normal.   Neck: Normal range of motion. Neck supple.   Cardiovascular: Normal rate, regular rhythm and normal heart sounds.   No murmur heard.  Pulmonary/Chest: Effort normal and breath sounds normal. No respiratory distress.   Abdominal: Soft. Bowel sounds are normal. She exhibits no distension.   Musculoskeletal: Normal range of motion. She exhibits no edema.   Neurological: She is alert and oriented to person, place, and time.   Face symmetric. Moving all extremities.    Skin: Skin is warm and dry. Capillary refill takes less than 2 seconds.   Psychiatric: She has a normal mood and affect.   Nursing note and vitals reviewed.        MEDS:    cefTRIAXone (ROCEPHIN) IVPB  1 g Intravenous Q24H    heparin (porcine)  5,000 Units Subcutaneous Q8H               dextrose 50 % in water (D50W), dextrose 50 % in water (D50W), glucagon (human recombinant), " glucose, glucose, sodium chloride 0.9%    LABS  Trended Lab Data:  Recent Labs   Lab 04/17/19  2138 04/18/19  0346 04/19/19  0539   WBC 19.92* 17.54* 12.51   HGB 12.6 11.8* 11.9*   HCT 37.0 35.2* 36.6*    211 178   MCV 87 87 89   RDW 12.2 12.1 12.3   * 133* 135*   K 4.0 3.6 3.9   CL 97 100 104   CO2 23 22* 22*   BUN 26* 21 18   CREATININE 1.0 0.8 0.7   * 190* 112*   PROT 6.8 6.9 6.7   ALBUMIN 3.2* 2.8* 2.5*   BILITOT 0.9 0.7 0.4   AST 22 20 20   ALKPHOS 113 116 113   ALT 24 23 21       Trended Cardiac Data:  No results for input(s): TROPONINI, CKTOTAL, CKMB, BNP in the last 168 hours.    Microbiology Data:  4/17 Urine cx >100,000 ecoli, sensitivities pending  4/18 Blood cx 1 bottle ngtd, other bottle GNR  4/19 Blood cx sent    Other Laboratory Data:  Lactate 2.3 --> 0.9    RADIOLOGY  None new    ANTIBIOTICS  Ceftriaxone 4/18 - present    LINES  PIV    Assessment   Beth Flynn is a 63 year old female, with PMHx of depression and GERD, who was brought into ED by family for somnolence and confusion for one day. She has been admitted to floor and being treated for sepsis secondary to UTI.    Plan   Severe sepsis 2/2 to urinary tract infection  Patient brought into ED with one day of drowsiness and one hour of somnolence/confusion. GCS of 15 on arrival. Patient endorsing subjective F/C and dysuria for 2 days. On admission, patient with 4 SIRS criteria: HR 92, RR 24, WBC 19.9, LA 2.3. Procalc 1.51. Urinalysis with many bacteria, WBC >100, +nitrite. Started on rocephin empirically. Urine culture now growing ecoli, sensitivities pending. 1/2 Blood cultures growing GNR. Repeat blood cultures sent today. Lactate now normalized.  -Continue rocephin.   -Follow up culture speciation and sensitivities.   -Follow up repeat cultures.      Acute encephalopathy 2/2 sepsis vs polypharmacy/overdose  Unable to reach collateral, but per ED note: 1 day of increased drowsiness and one hour of somnolence and  "confusion. Found next to unlabeled, empty pill bottle. CT head without acute abnormality. Etiology of AMS likely multifactorial including sepsis and polypharmacy. Received medication list from her pharmacy and includes multiple pain medications, flexeril, and benzos. Initially concerns for overdose but patient denies and denies SI/HI. Poison control initially notified due to concerns for overdose but recommended supportive care.   -Antibiotics as above.   -Avoid sedating meds.   -Patient with reports of "seizures" and "twitching". Nothing noted since admission. CT head negative. Not on any seizure meds. Never seen neurologist. Will likely refer to neurology as outpatient.     Depression: On several anti-depressants. Initially concerns for overdose, but patient denies OD as well as denies SI/HI.   - holding home meds for now     Type 2 DM: A1C 6.4. On metformin at home.   -SSI and accuchecks.   -Holding home metformin.     Fluids: s/p 2L NS  Electrolytes: Hyponatremia - improving with IVF  Nutrition: Cardiac    DVT prophylaxis: Heparin  Monitoring: tele  Code Status: Full    Dispo: Continue IV abx. Follow up initial and repeat cultures.     Henrietta Amaro  [facility] Internal Medicine - Pediatrics HO-4    [facility] Medicine Hospitalist Pager numbers:   [facility] Hospitalist Medicine Team A (Madhavi/Faby): [phone]  [facility] Hospitalist Medicine Team B (Kaleigh/Rojelio):  [phone]          "

## 2019-04-19 NOTE — PLAN OF CARE
Problem: Adult Inpatient Plan of Care  Goal: Plan of Care Review  Outcome: Ongoing (interventions implemented as appropriate)  Plan of care reviewed with patient, understanding verbalized.  Pt remains SR on tele. No complaints overnight. Pt oriented to person, place and time (though occasionally forgetful of year).  [employer] monitoring in place. Bed alarm on, call light in reach, fall precautions in place. Will continue to monitor.

## 2019-04-20 PROBLEM — A41.9 SEPSIS DUE TO URINARY TRACT INFECTION: Status: RESOLVED | Noted: 2019-04-18 | Resolved: 2019-04-20

## 2019-04-20 PROBLEM — N39.0 SEPSIS DUE TO URINARY TRACT INFECTION: Status: RESOLVED | Noted: 2019-04-18 | Resolved: 2019-04-20

## 2019-04-20 PROBLEM — G93.40 ACUTE ENCEPHALOPATHY: Status: RESOLVED | Noted: 2019-04-18 | Resolved: 2019-04-20

## 2019-04-20 LAB
ALBUMIN SERPL BCP-MCNC: 2.4 G/DL (ref 3.5–5.2)
ALP SERPL-CCNC: 118 U/L (ref 55–135)
ALT SERPL W/O P-5'-P-CCNC: 25 U/L (ref 10–44)
ANION GAP SERPL CALC-SCNC: 10 MMOL/L (ref 8–16)
AST SERPL-CCNC: 23 U/L (ref 10–40)
BACTERIA BLD CULT: NORMAL
BACTERIA UR CULT: NORMAL
BASOPHILS # BLD AUTO: 0.01 K/UL (ref 0–0.2)
BASOPHILS NFR BLD: 0.1 % (ref 0–1.9)
BILIRUB SERPL-MCNC: 0.3 MG/DL (ref 0.1–1)
BUN SERPL-MCNC: 14 MG/DL (ref 8–23)
CALCIUM SERPL-MCNC: 8.8 MG/DL (ref 8.7–10.5)
CHLORIDE SERPL-SCNC: 104 MMOL/L (ref 95–110)
CO2 SERPL-SCNC: 23 MMOL/L (ref 23–29)
CREAT SERPL-MCNC: 0.6 MG/DL (ref 0.5–1.4)
DIFFERENTIAL METHOD: ABNORMAL
EOSINOPHIL # BLD AUTO: 0.1 K/UL (ref 0–0.5)
EOSINOPHIL NFR BLD: 0.5 % (ref 0–8)
ERYTHROCYTE [DISTWIDTH] IN BLOOD BY AUTOMATED COUNT: 12.2 % (ref 11.5–14.5)
EST. GFR  (AFRICAN AMERICAN): >60 ML/MIN/1.73 M^2
EST. GFR  (NON AFRICAN AMERICAN): >60 ML/MIN/1.73 M^2
GLUCOSE SERPL-MCNC: 133 MG/DL (ref 70–110)
HCT VFR BLD AUTO: 35 % (ref 37–48.5)
HGB BLD-MCNC: 11.8 G/DL (ref 12–16)
LYMPHOCYTES # BLD AUTO: 1.3 K/UL (ref 1–4.8)
LYMPHOCYTES NFR BLD: 12.9 % (ref 18–48)
MCH RBC QN AUTO: 29.1 PG (ref 27–31)
MCHC RBC AUTO-ENTMCNC: 33.7 G/DL (ref 32–36)
MCV RBC AUTO: 86 FL (ref 82–98)
MONOCYTES # BLD AUTO: 0.9 K/UL (ref 0.3–1)
MONOCYTES NFR BLD: 8.5 % (ref 4–15)
NEUTROPHILS # BLD AUTO: 8 K/UL (ref 1.8–7.7)
NEUTROPHILS NFR BLD: 78 % (ref 38–73)
PLATELET # BLD AUTO: 248 K/UL (ref 150–350)
PLATELET BLD QL SMEAR: ABNORMAL
PMV BLD AUTO: 9.2 FL (ref 9.2–12.9)
POCT GLUCOSE: 143 MG/DL (ref 70–110)
POCT GLUCOSE: 146 MG/DL (ref 70–110)
POCT GLUCOSE: 148 MG/DL (ref 70–110)
POCT GLUCOSE: 148 MG/DL (ref 70–110)
POTASSIUM SERPL-SCNC: 3.7 MMOL/L (ref 3.5–5.1)
PROT SERPL-MCNC: 6.5 G/DL (ref 6–8.4)
RBC # BLD AUTO: 4.06 M/UL (ref 4–5.4)
SODIUM SERPL-SCNC: 137 MMOL/L (ref 136–145)
WBC # BLD AUTO: 10.32 K/UL (ref 3.9–12.7)

## 2019-04-20 PROCEDURE — 27000221 HC OXYGEN, UP TO 24 HOURS

## 2019-04-20 PROCEDURE — 94761 N-INVAS EAR/PLS OXIMETRY MLT: CPT

## 2019-04-20 PROCEDURE — 80053 COMPREHEN METABOLIC PANEL: CPT

## 2019-04-20 PROCEDURE — 63600175 PHARM REV CODE 636 W HCPCS: Performed by: INTERNAL MEDICINE

## 2019-04-20 PROCEDURE — 25000003 PHARM REV CODE 250: Performed by: STUDENT IN AN ORGANIZED HEALTH CARE EDUCATION/TRAINING PROGRAM

## 2019-04-20 PROCEDURE — 25000003 PHARM REV CODE 250: Performed by: INTERNAL MEDICINE

## 2019-04-20 PROCEDURE — 85025 COMPLETE CBC W/AUTO DIFF WBC: CPT

## 2019-04-20 PROCEDURE — 36415 COLL VENOUS BLD VENIPUNCTURE: CPT

## 2019-04-20 PROCEDURE — 11000001 HC ACUTE MED/SURG PRIVATE ROOM

## 2019-04-20 RX ADMIN — CEFTRIAXONE 1 G: 1 INJECTION, SOLUTION INTRAVENOUS at 01:04

## 2019-04-20 RX ADMIN — HEPARIN SODIUM 5000 UNITS: 5000 INJECTION, SOLUTION INTRAVENOUS; SUBCUTANEOUS at 09:04

## 2019-04-20 RX ADMIN — SIMVASTATIN 40 MG: 40 TABLET, FILM COATED ORAL at 09:04

## 2019-04-20 RX ADMIN — ACETAMINOPHEN 650 MG: 325 TABLET ORAL at 09:04

## 2019-04-20 RX ADMIN — HEPARIN SODIUM 5000 UNITS: 5000 INJECTION, SOLUTION INTRAVENOUS; SUBCUTANEOUS at 05:04

## 2019-04-20 RX ADMIN — HEPARIN SODIUM 5000 UNITS: 5000 INJECTION, SOLUTION INTRAVENOUS; SUBCUTANEOUS at 03:04

## 2019-04-20 NOTE — PROGRESS NOTES
[facility] MEDICINE TEAM B PROGRESS NOTE - HO-1  2019    Subjective   Pt resting comfortably in bed this morning. Informed her of positive cx from . Answered questions. No fevers or chills overnight.      Objective   BP  Min: 110/59  Max: 127/68  Temp  Av.9 °F (36.6 °C)  Min: 96.2 °F (35.7 °C)  Max: 98.7 °F (37.1 °C)  Pulse  Av.7  Min: 68  Max: 101  Resp  Av  Min: 18  Max: 18  SpO2  Av %  Min: 97 %  Max: 99 %  Body mass index is 28.34 kg/m².  I/O last 3 completed shifts:  In: 925 [P.O.:875; IV Piggyback:50]  Out: 3150 [Urine:3150]    Physical Exam   Constitutional: She is oriented to person, place, and time. She appears well-developed and well-nourished. No distress.   Sleepy but arousable   HENT:   Head: Normocephalic and atraumatic.   Slightly flushed appearing   Eyes: Pupils are equal, round, and reactive to light. Conjunctivae are normal.   Neck: Normal range of motion. Neck supple.   Cardiovascular: Normal rate, regular rhythm and normal heart sounds.   No murmur heard.  Pulmonary/Chest: Effort normal and breath sounds normal. No respiratory distress.   Abdominal: Soft. Bowel sounds are normal. She exhibits no distension.   Musculoskeletal: Normal range of motion. She exhibits no edema.   Neurological: She is alert and oriented to person, place, and time.   Face symmetric. Moving all extremities.    Skin: Skin is warm and dry. Capillary refill takes less than 2 seconds.   Psychiatric: She has a normal mood and affect.   Nursing note and vitals reviewed.        MEDS:    cefTRIAXone (ROCEPHIN) IVPB  1 g Intravenous Q24H    heparin (porcine)  5,000 Units Subcutaneous Q8H    simvastatin  40 mg Oral QHS               acetaminophen, dextrose 50 % in water (D50W), dextrose 50 % in water (D50W), dextrose 50 % in water (D50W), dextrose 50 % in water (D50W), glucagon (human recombinant), glucagon (human recombinant), glucose, glucose, glucose, glucose, insulin aspart U-100, sodium chloride  0.9%    LABS  Trended Lab Data:  Recent Labs   Lab 04/18/19  0346 04/19/19  0539 04/20/19  0516   WBC 17.54* 12.51 10.32   HGB 11.8* 11.9* 11.8*   HCT 35.2* 36.6* 35.0*    178 248   MCV 87 89 86   RDW 12.1 12.3 12.2   * 135* 137   K 3.6 3.9 3.7    104 104   CO2 22* 22* 23   BUN 21 18 14   CREATININE 0.8 0.7 0.6   * 112* 133*   PROT 6.9 6.7 6.5   ALBUMIN 2.8* 2.5* 2.4*   BILITOT 0.7 0.4 0.3   AST 20 20 23   ALKPHOS 116 113 118   ALT 23 21 25       Trended Cardiac Data:  No results for input(s): TROPONINI, CKTOTAL, CKMB, BNP in the last 168 hours.    Microbiology Data:  4/17 Urine cx >100,000 ecoli, sensitivities pending  4/18 Blood cx 1 bottle ngtd, other bottle GNR  4/19 Blood cx sent    Other Laboratory Data:  Lactate 2.3 --> 0.9    RADIOLOGY  None new    ANTIBIOTICS  Ceftriaxone 4/18 - present    LINES  PIV    Assessment   Beth Flynn is a 63 year old female, with PMHx of depression and GERD, who was brought into ED by family for somnolence and confusion for one day. She has been admitted to floor and being treated for sepsis secondary to UTI.    Plan   Severe sepsis 2/2 to urinary tract infection  Patient brought into ED with one day of drowsiness and one hour of somnolence/confusion. GCS of 15 on arrival. Patient endorsing subjective F/C and dysuria for 2 days. On admission, patient with 4 SIRS criteria: HR 92, RR 24, WBC 19.9, LA 2.3. Procalc 1.51. Urinalysis with many bacteria, WBC >100, +nitrite. Started on rocephin empirically. Urine culture now growing ecoli, sensitivities pending. 1/2 Blood cultures growing GNR. Repeat blood cultures sent today. Lactate now normalized.  -Continue rocephin.   -Follow up culture speciation and sensitivities. Blood cx from 4/18 positive for e coli susceptible to Augmentin, cipro. Resistant to Bactrim. Consider deescalation today.  -Follow up repeat cultures. Cx from 4/18 positive. 4/19 NGTD     Acute encephalopathy 2/2 sepsis vs  "polypharmacy/overdose  Unable to reach collateral, but per ED note: 1 day of increased drowsiness and one hour of somnolence and confusion. Found next to unlabeled, empty pill bottle. CT head without acute abnormality. Etiology of AMS likely multifactorial including sepsis and polypharmacy. Received medication list from her pharmacy and includes multiple pain medications, flexeril, and benzos. Initially concerns for overdose but patient denies and denies SI/HI. Poison control initially notified due to concerns for overdose but recommended supportive care.   -Antibiotics as above.   -Avoid sedating meds.   -Patient with reports of "seizures" and "twitching". Nothing noted since admission. CT head negative. Not on any seizure meds. Never seen neurologist. Will likely refer to neurology as outpatient.     Depression: On several anti-depressants. Initially concerns for overdose, but patient denies OD as well as denies SI/HI.   - holding home meds for now     Type 2 DM: A1C 6.4. On metformin at home.   -SSI and accuchecks.   -Holding home metformin.     Fluids: s/p 2L NS  Electrolytes: Hyponatremia - improving with IVF  Nutrition: Cardiac    DVT prophylaxis: Heparin  Monitoring: tele  Code Status: Full    Dispo: pending cleared cx    Alvarado Davis  HO-1          "

## 2019-04-21 PROBLEM — F32.A DEPRESSION: Status: ACTIVE | Noted: 2019-04-21

## 2019-04-21 PROBLEM — E11.9 TYPE 2 DIABETES MELLITUS: Status: ACTIVE | Noted: 2019-04-21

## 2019-04-21 LAB
ALBUMIN SERPL BCP-MCNC: 2.4 G/DL (ref 3.5–5.2)
ALP SERPL-CCNC: 126 U/L (ref 55–135)
ALT SERPL W/O P-5'-P-CCNC: 31 U/L (ref 10–44)
ANION GAP SERPL CALC-SCNC: 8 MMOL/L (ref 8–16)
AST SERPL-CCNC: 26 U/L (ref 10–40)
BASOPHILS # BLD AUTO: 0.02 K/UL (ref 0–0.2)
BASOPHILS NFR BLD: 0.2 % (ref 0–1.9)
BILIRUB SERPL-MCNC: 0.3 MG/DL (ref 0.1–1)
BUN SERPL-MCNC: 13 MG/DL (ref 8–23)
CALCIUM SERPL-MCNC: 8.9 MG/DL (ref 8.7–10.5)
CHLORIDE SERPL-SCNC: 105 MMOL/L (ref 95–110)
CO2 SERPL-SCNC: 26 MMOL/L (ref 23–29)
CREAT SERPL-MCNC: 0.7 MG/DL (ref 0.5–1.4)
DIFFERENTIAL METHOD: ABNORMAL
EOSINOPHIL # BLD AUTO: 0.1 K/UL (ref 0–0.5)
EOSINOPHIL NFR BLD: 0.8 % (ref 0–8)
ERYTHROCYTE [DISTWIDTH] IN BLOOD BY AUTOMATED COUNT: 12.3 % (ref 11.5–14.5)
EST. GFR  (AFRICAN AMERICAN): >60 ML/MIN/1.73 M^2
EST. GFR  (NON AFRICAN AMERICAN): >60 ML/MIN/1.73 M^2
GLUCOSE SERPL-MCNC: 146 MG/DL (ref 70–110)
HCT VFR BLD AUTO: 33.8 % (ref 37–48.5)
HGB BLD-MCNC: 11.2 G/DL (ref 12–16)
LYMPHOCYTES # BLD AUTO: 1.9 K/UL (ref 1–4.8)
LYMPHOCYTES NFR BLD: 16 % (ref 18–48)
MCH RBC QN AUTO: 28.7 PG (ref 27–31)
MCHC RBC AUTO-ENTMCNC: 33.1 G/DL (ref 32–36)
MCV RBC AUTO: 87 FL (ref 82–98)
MONOCYTES # BLD AUTO: 0.8 K/UL (ref 0.3–1)
MONOCYTES NFR BLD: 6.8 % (ref 4–15)
NEUTROPHILS # BLD AUTO: 8.7 K/UL (ref 1.8–7.7)
NEUTROPHILS NFR BLD: 75.2 % (ref 38–73)
PLATELET # BLD AUTO: 290 K/UL (ref 150–350)
PMV BLD AUTO: 9 FL (ref 9.2–12.9)
POCT GLUCOSE: 123 MG/DL (ref 70–110)
POCT GLUCOSE: 151 MG/DL (ref 70–110)
POCT GLUCOSE: 152 MG/DL (ref 70–110)
POCT GLUCOSE: 154 MG/DL (ref 70–110)
POTASSIUM SERPL-SCNC: 3.7 MMOL/L (ref 3.5–5.1)
PROT SERPL-MCNC: 6.5 G/DL (ref 6–8.4)
RBC # BLD AUTO: 3.9 M/UL (ref 4–5.4)
SODIUM SERPL-SCNC: 139 MMOL/L (ref 136–145)
WBC # BLD AUTO: 11.53 K/UL (ref 3.9–12.7)

## 2019-04-21 PROCEDURE — 25000003 PHARM REV CODE 250: Performed by: STUDENT IN AN ORGANIZED HEALTH CARE EDUCATION/TRAINING PROGRAM

## 2019-04-21 PROCEDURE — 97116 GAIT TRAINING THERAPY: CPT

## 2019-04-21 PROCEDURE — 25000003 PHARM REV CODE 250: Performed by: INTERNAL MEDICINE

## 2019-04-21 PROCEDURE — 94761 N-INVAS EAR/PLS OXIMETRY MLT: CPT

## 2019-04-21 PROCEDURE — 11000001 HC ACUTE MED/SURG PRIVATE ROOM

## 2019-04-21 PROCEDURE — 97530 THERAPEUTIC ACTIVITIES: CPT

## 2019-04-21 PROCEDURE — 27000221 HC OXYGEN, UP TO 24 HOURS

## 2019-04-21 PROCEDURE — 63600175 PHARM REV CODE 636 W HCPCS: Performed by: INTERNAL MEDICINE

## 2019-04-21 PROCEDURE — 85025 COMPLETE CBC W/AUTO DIFF WBC: CPT

## 2019-04-21 PROCEDURE — 80053 COMPREHEN METABOLIC PANEL: CPT

## 2019-04-21 PROCEDURE — 36415 COLL VENOUS BLD VENIPUNCTURE: CPT

## 2019-04-21 RX ORDER — SODIUM CHLORIDE 9 MG/ML
INJECTION, SOLUTION INTRAVENOUS ONCE
Status: COMPLETED | OUTPATIENT
Start: 2019-04-21 | End: 2019-04-21

## 2019-04-21 RX ADMIN — SIMVASTATIN 40 MG: 40 TABLET, FILM COATED ORAL at 09:04

## 2019-04-21 RX ADMIN — HEPARIN SODIUM 5000 UNITS: 5000 INJECTION, SOLUTION INTRAVENOUS; SUBCUTANEOUS at 09:04

## 2019-04-21 RX ADMIN — CEFTRIAXONE 1 G: 1 INJECTION, SOLUTION INTRAVENOUS at 01:04

## 2019-04-21 RX ADMIN — HEPARIN SODIUM 5000 UNITS: 5000 INJECTION, SOLUTION INTRAVENOUS; SUBCUTANEOUS at 03:04

## 2019-04-21 RX ADMIN — ACETAMINOPHEN 650 MG: 325 TABLET ORAL at 09:04

## 2019-04-21 RX ADMIN — SODIUM CHLORIDE: 0.9 INJECTION, SOLUTION INTRAVENOUS at 07:04

## 2019-04-21 RX ADMIN — ACETAMINOPHEN 650 MG: 325 TABLET ORAL at 12:04

## 2019-04-21 RX ADMIN — HEPARIN SODIUM 5000 UNITS: 5000 INJECTION, SOLUTION INTRAVENOUS; SUBCUTANEOUS at 06:04

## 2019-04-21 NOTE — PLAN OF CARE
Problem: Adult Inpatient Plan of Care  Goal: Plan of Care Review  Outcome: Ongoing (interventions implemented as appropriate)  Plan of care reviewed, pt verbalize understanding. Pt AAOx4, complained of headache number a 10 out of 10. Administered acetaminophen prn as ordered for pain. SR on the Tele. Fall precaution maintained: call light within reach, bed alarm set, bed on lowest position. Avasy by the bed side. Will continue to monitor.

## 2019-04-21 NOTE — PT/OT/SLP PROGRESS
Physical Therapy Treatment    Patient Name:  Beth Flynn   MRN:  [id number]    Recommendations:     Discharge Recommendations:  home health PT   Discharge Equipment Recommendations: walker, rolling, shower chair   Barriers to discharge: Inaccessible home    Assessment:     Beth Flynn is a 63 y.o. female admitted with a medical diagnosis of Acute encephalopathy.  She presents with the following impairments/functional limitations:  weakness, impaired self care skills, impaired balance, impaired endurance, impaired functional mobilty, pain, decreased lower extremity function, gait instability. Amb'd ~200' /c RW /c S. Pt experienced feeling hot, suffocated, headache, appears flushed in face and perspiring when 75% through amb trial and /p amb trial. BP WNL. Presents /c no s/s of distress. Suffocated feeling subsided post tx session /c symptom only to be having a headache. Nurse notified/aware. Cold water given /c cold towel applied to face/forehead and neck to cool pt down.     Rehab Prognosis: Good; patient would benefit from acute skilled PT services to address these deficits and reach maximum level of function.    Recent Surgery: * No surgery found *      Plan:     During this hospitalization, patient to be seen 6 x/week to address the identified rehab impairments via gait training, therapeutic activities, therapeutic exercises and progress toward the following goals:    · Plan of Care Expires:  05/18/19    Subjective     Chief Complaint: My legs are sore.   Patient/Family Comments/goals: I feel suffocated.  Pain/Comfort:  · Pain Rating 1: (pt did not rate pain)  · Location - Side 1: Bilateral  · Location - Orientation 1: generalized  · Location 1: leg  · Pain Addressed 1: Distraction, Cessation of Activity  · Location - Side 2: Bilateral  · Location - Orientation 2: generalized  · Location 2: head  · Pain Addressed 2: Nurse notified, Reposition  · Pain Rating Post-Intervention 2: 7/10      Objective:      Communicated with nurse Dick prior to session.  Patient found supine with bed alarm, oxygen(.5L of O2 (NC)) upon PT entry to room.     General Precautions: Standard, fall   Orthopedic Precautions:N/A   Braces: N/A     Functional Mobility:  · Bed Mobility:     · Supine to Sit: supervision  · Sit to Supine: supervision  · Transfers:     · Sit to Stand:  supervision with rolling walker  · Gait: ~200' /c RW /c S. Amb'd on room air. Decreased pollo. Pt started to experience feeling hot/flushed 75% through amb trial. Returned pt back to bed at EOB in sitting. Pt stated she felt suffocated at rest. BP sitting at EOB WNL (See vital under Epic vital tab). PTA notified nurse Mayelin. Nurse present.       AM-PAC 6 CLICK MOBILITY  Turning over in bed (including adjusting bedclothes, sheets and blankets)?: 4  Sitting down on and standing up from a chair with arms (e.g., wheelchair, bedside commode, etc.): 4  Moving from lying on back to sitting on the side of the bed?: 4  Moving to and from a bed to a chair (including a wheelchair)?: 4  Need to walk in hospital room?: 4  Climbing 3-5 steps with a railing?: 3  Basic Mobility Total Score: 23       Therapeutic Activities and Exercises:  SaO2 /p amb trial on RA 98%, 101 bpm. Pt presents /c no s/s.  BP standing for ~1-2 min /c RW - 120/75, 111 bpm. Donned .5L of O2 (NC). SaO2 maintained within mid to high 90's.    Cold water given /c cold towel applied to face/forehead and post neck.    Pt sat EOB for ~7-8 min to rest. Symptoms progress to pt having a headache only. Returned pt back to supine.     Patient left supine with call button in reach, bed alarm on and nurse notified..    GOALS:   Multidisciplinary Problems     Physical Therapy Goals        Problem: Physical Therapy Goal    Goal Priority Disciplines Outcome Goal Variances Interventions   Physical Therapy Goal     PT, PT/OT Ongoing (interventions implemented as appropriate)     Description:  Goals to be met by:  2019     Patient will increase functional independence with mobility by performin. Supine to sit with [place]  2. Sit to stand transfer with [place]  3. Gait  x >200 feet with [place] using no AD.                       Time Tracking:     PT Received On: 19  PT Start Time: 915     PT Stop Time: 941  PT Total Time (min): 26 min     Billable Minutes: Gait Training 11 and Therapeutic Activity 15    Treatment Type: Treatment  PT/PTA: PTA     PTA Visit Number: 1     Randi Nueñz PTA  2019

## 2019-04-21 NOTE — PLAN OF CARE
Problem: Physical Therapy Goal  Goal: Physical Therapy Goal  Goals to be met by: 2019     Patient will increase functional independence with mobility by performin. Supine to sit with [place]  2. Sit to stand transfer with [place]  3. Gait  x >200 feet with [place] using no AD.      Outcome: Ongoing (interventions implemented as appropriate)  Progressing /c therapy. Cont POC.

## 2019-04-21 NOTE — PROGRESS NOTES
hospitals Internal Medicine Progress Note    Primary Team: hospitals Hospitalist Team B  Attending Physician: Dylan Farris MD  Resident: Dr. Amaro  Intern: Dr. Dunn    Subjective:      Pt awake laying flat in bed with lights off, wet cloth on forehead. Complaints of a headache this AM. Denies other complaints including nausea, fevers, chills, abdominal pain. Discussed plan for discharge on PO antibiotics.      Objective:     Last 24 Hour Vital Signs:  BP  Min: 112/67  Max: 130/71  Temp  Av.9 °F (36.6 °C)  Min: 96.3 °F (35.7 °C)  Max: 98.9 °F (37.2 °C)  Pulse  Av.2  Min: 95  Max: 118  Resp  Av  Min: 16  Max: 18  SpO2  Av.9 %  Min: 96 %  Max: 99 %  Weight  Av.3 kg (163 lb 12.8 oz)  Min: 74.3 kg (163 lb 12.8 oz)  Max: 74.3 kg (163 lb 12.8 oz)  I/O last 3 completed shifts:  In: 425 [P.O.:425]  Out: 3700 [Urine:3700]    Physical Examination:  Gen: well developed, well nourished, wet washcloth on forehead  Head: normocephalic, atraumatic  Eyes: clear conjunctiva, non-icteric sclera  Neck: symmetric, supple, no thyromegaly, masses or lymphadenopathy  CV: regular rate & rhythm, clear S1 & S2  Resp: normal work of breathing on RA, clear breath sounds in all fields, no wheezes, rhonchi or crackles  Abd: positive bowel sounds, non-distended, soft, non-tender  Ext: warm & well perfused, 2+ radial pulses, no peripheral edema  Msk: spine midline, no joint swelling, erythema or tenderness  Neuro: AA&Ox3, appropriate to conversation, distal sensation intact, normal coordination  Skin: normal color & turgor, no obvious lesions or eruptions    Laboratory:  Trended Lab Data:  Recent Labs   Lab 19  0539 19  0516 19  0641   WBC 12.51 10.32 11.53   HGB 11.9* 11.8* 11.2*   HCT 36.6* 35.0* 33.8*    248 290   MCV 89 86 87   RDW 12.3 12.2 12.3   * 137 139   K 3.9 3.7 3.7    104 105   CO2 22* 23 26   BUN 18 14 13   CREATININE 0.7 0.6 0.7   * 133* 146*   PROT 6.7 6.5 6.5    ALBUMIN 2.5* 2.4* 2.4*   BILITOT 0.4 0.3 0.3   AST 20 23 26   ALKPHOS 113 118 126   ALT 21 25 31       Trended Cardiac Data:  No results for input(s): TROPONINI, CKTOTAL, CKMB, BNP in the last 168 hours.    Microbiology:  Urine Cx 4/17: >100k CFU E coli (resistent to amp, bactrim)  Blood Cx 4/18: E. Coli (resistent to amp, bactrim)  Blood Cx 4/19: NGTD    Antibiotics and Day Number of Therapy:  Rocephin (4/17-present)    Cardiac Studies:  EKG:  - No new studies    Radiology:  CXR 4/17/19:  1. Hypoventilatory exam accentuates the interstitial attenuation, differential would include congestive change/early edema.  Correlation advised.  Obscuration of the left costophrenic angle is likely on the basis of overlying soft tissue, pleural fluid felt less likely.    CT Head 4/17/19:  No acute intracranial abnormality detected.    Current Medications:     Infusions:       Scheduled:   cefTRIAXone (ROCEPHIN) IVPB  1 g Intravenous Q24H    heparin (porcine)  5,000 Units Subcutaneous Q8H    simvastatin  40 mg Oral QHS        PRN:  acetaminophen, dextrose 50 % in water (D50W), dextrose 50 % in water (D50W), dextrose 50 % in water (D50W), dextrose 50 % in water (D50W), glucagon (human recombinant), glucagon (human recombinant), glucose, glucose, glucose, glucose, insulin aspart U-100, sodium chloride 0.9%      Assessment:     Beth Flynn is a 63 y.o.female with    Plan:     #Severe sepsis 2/2 E coli UTI & Bacteremia:  Patient brought into ED with one day of drowsiness and one hour of somnolence/confusion. GCS of 15 on arrival. Patient endorsing subjective F/C and dysuria for 2 days. On admission, patient with 4 SIRS criteria: HR 92, RR 24, WBC 19.9, LA 2.3. Procal 1.51. Urinalysis with many bacteria, WBC >100, +nitrite. Started on Rocephin empirically. Urine Cx 4/17 with E coli. 1/2 Blood Cx 4/17 with E coli. Repeat Blood Cx 4/19 NGTD (>48hrs).   - Continue Rocephin. De-escalate to Augmentin on discharge to complete 14  "day course for bacteremia (end date 2/3).     #Acute Infectious vs Metabolic Encephalopathy 2/2 Sepsis vs Polypharmacy:  Unable to reach collateral, but per ED note: 1 day of increased drowsiness and one hour of somnolence and confusion. Found next to unlabeled, empty pill bottle. CT head without acute abnormality. Received medication list from her pharmacy and includes multiple pain medications, flexeril, and benzos. Initially concerns for overdose but patient denies and denies SI/HI. Poison control initially notified due to concerns for overdose but recommended supportive care.  Etiology of AMS likely multifactorial including sepsis and polypharmacy.  - Antibiotics for UTI, bacteremia as above.   - Avoid sedating meds.   - Patient with reports of "seizures" and "twitching". Nothing noted since admission. CT head negative. Not on any seizure meds. Never seen neurologist. Will refer to neurology as outpatient.      #Type 2 DM: Home Meds: Metformin 500mg BID. A1C 6.4.   - Hold home Metformin  - Accuchecks q6, SSI while IP     #Depression: Home Meds: Aripriprazole 10mg daily, Duloxetine 60mg daily, Klonopin 1mg BID. Initially concerns for overdose, patient denies OD, denies SI/HI.   - Holding home meds.     Fluids: None  Electrolytes: wnl  Nutrition: Cardiac  DVT Ppx: Heparin  Code Status: Full    Dispo: discharge home today to complete course of oral abx    Chaim Dunn MD  [facility] Internal Medicine HO-1  [facility] Hospitalist Medicine Team B    [facility] Medicine Hospitalist Pager numbers:   [facility] Hospitalist Medicine Team A (Madhavi/Faby): [phone]  [facility] Hospitalist Medicine Team B (Kaleigh/Rojelio):  [phone]    "

## 2019-04-22 VITALS
TEMPERATURE: 97 F | BODY MASS INDEX: 28.98 KG/M2 | HEART RATE: 100 BPM | RESPIRATION RATE: 20 BRPM | OXYGEN SATURATION: 99 % | SYSTOLIC BLOOD PRESSURE: 122 MMHG | WEIGHT: 163.56 LBS | DIASTOLIC BLOOD PRESSURE: 86 MMHG | HEIGHT: 63 IN

## 2019-04-22 PROBLEM — R19.7 ACUTE DIARRHEA: Status: ACTIVE | Noted: 2019-04-22

## 2019-04-22 LAB
ALBUMIN SERPL BCP-MCNC: 2.5 G/DL (ref 3.5–5.2)
ALP SERPL-CCNC: 134 U/L (ref 55–135)
ALT SERPL W/O P-5'-P-CCNC: 35 U/L (ref 10–44)
ANION GAP SERPL CALC-SCNC: 10 MMOL/L (ref 8–16)
AST SERPL-CCNC: 26 U/L (ref 10–40)
BASOPHILS # BLD AUTO: 0.03 K/UL (ref 0–0.2)
BASOPHILS NFR BLD: 0.3 % (ref 0–1.9)
BILIRUB SERPL-MCNC: 0.3 MG/DL (ref 0.1–1)
BUN SERPL-MCNC: 9 MG/DL (ref 8–23)
C DIFF GDH STL QL: NEGATIVE
C DIFF TOX A+B STL QL IA: NEGATIVE
CALCIUM SERPL-MCNC: 8.8 MG/DL (ref 8.7–10.5)
CHLORIDE SERPL-SCNC: 106 MMOL/L (ref 95–110)
CO2 SERPL-SCNC: 23 MMOL/L (ref 23–29)
CREAT SERPL-MCNC: 0.7 MG/DL (ref 0.5–1.4)
DIFFERENTIAL METHOD: ABNORMAL
EOSINOPHIL # BLD AUTO: 0.1 K/UL (ref 0–0.5)
EOSINOPHIL NFR BLD: 0.7 % (ref 0–8)
ERYTHROCYTE [DISTWIDTH] IN BLOOD BY AUTOMATED COUNT: 12.2 % (ref 11.5–14.5)
EST. GFR  (AFRICAN AMERICAN): >60 ML/MIN/1.73 M^2
EST. GFR  (NON AFRICAN AMERICAN): >60 ML/MIN/1.73 M^2
GLUCOSE SERPL-MCNC: 180 MG/DL (ref 70–110)
HCT VFR BLD AUTO: 34.3 % (ref 37–48.5)
HGB BLD-MCNC: 11.6 G/DL (ref 12–16)
LYMPHOCYTES # BLD AUTO: 1.9 K/UL (ref 1–4.8)
LYMPHOCYTES NFR BLD: 17.7 % (ref 18–48)
MCH RBC QN AUTO: 29.1 PG (ref 27–31)
MCHC RBC AUTO-ENTMCNC: 33.8 G/DL (ref 32–36)
MCV RBC AUTO: 86 FL (ref 82–98)
MONOCYTES # BLD AUTO: 0.8 K/UL (ref 0.3–1)
MONOCYTES NFR BLD: 7.7 % (ref 4–15)
NEUTROPHILS # BLD AUTO: 7.8 K/UL (ref 1.8–7.7)
NEUTROPHILS NFR BLD: 71.9 % (ref 38–73)
PLATELET # BLD AUTO: 341 K/UL (ref 150–350)
PMV BLD AUTO: 9 FL (ref 9.2–12.9)
POCT GLUCOSE: 138 MG/DL (ref 70–110)
POCT GLUCOSE: 145 MG/DL (ref 70–110)
POCT GLUCOSE: 160 MG/DL (ref 70–110)
POTASSIUM SERPL-SCNC: 3.5 MMOL/L (ref 3.5–5.1)
PROT SERPL-MCNC: 6.7 G/DL (ref 6–8.4)
RBC # BLD AUTO: 3.98 M/UL (ref 4–5.4)
SODIUM SERPL-SCNC: 139 MMOL/L (ref 136–145)
WBC # BLD AUTO: 10.78 K/UL (ref 3.9–12.7)

## 2019-04-22 PROCEDURE — 36415 COLL VENOUS BLD VENIPUNCTURE: CPT

## 2019-04-22 PROCEDURE — 80053 COMPREHEN METABOLIC PANEL: CPT

## 2019-04-22 PROCEDURE — 63600175 PHARM REV CODE 636 W HCPCS: Performed by: INTERNAL MEDICINE

## 2019-04-22 PROCEDURE — 97116 GAIT TRAINING THERAPY: CPT

## 2019-04-22 PROCEDURE — 94761 N-INVAS EAR/PLS OXIMETRY MLT: CPT

## 2019-04-22 PROCEDURE — 97530 THERAPEUTIC ACTIVITIES: CPT

## 2019-04-22 PROCEDURE — 85025 COMPLETE CBC W/AUTO DIFF WBC: CPT

## 2019-04-22 PROCEDURE — 87449 NOS EACH ORGANISM AG IA: CPT

## 2019-04-22 PROCEDURE — 25000003 PHARM REV CODE 250: Performed by: STUDENT IN AN ORGANIZED HEALTH CARE EDUCATION/TRAINING PROGRAM

## 2019-04-22 PROCEDURE — 97110 THERAPEUTIC EXERCISES: CPT

## 2019-04-22 RX ORDER — AMOXICILLIN AND CLAVULANATE POTASSIUM 875; 125 MG/1; MG/1
1 TABLET, FILM COATED ORAL EVERY 12 HOURS
Qty: 22 TABLET | Refills: 0 | Status: SHIPPED | OUTPATIENT
Start: 2019-04-23 | End: 2019-05-03

## 2019-04-22 RX ORDER — FAMOTIDINE 20 MG/1
40 TABLET, FILM COATED ORAL DAILY
Status: DISCONTINUED | OUTPATIENT
Start: 2019-04-22 | End: 2019-04-22 | Stop reason: HOSPADM

## 2019-04-22 RX ADMIN — ACETAMINOPHEN 650 MG: 325 TABLET ORAL at 05:04

## 2019-04-22 RX ADMIN — HEPARIN SODIUM 5000 UNITS: 5000 INJECTION, SOLUTION INTRAVENOUS; SUBCUTANEOUS at 05:04

## 2019-04-22 RX ADMIN — HEPARIN SODIUM 5000 UNITS: 5000 INJECTION, SOLUTION INTRAVENOUS; SUBCUTANEOUS at 03:04

## 2019-04-22 RX ADMIN — ACETAMINOPHEN 650 MG: 325 TABLET ORAL at 06:04

## 2019-04-22 RX ADMIN — FAMOTIDINE 40 MG: 20 TABLET, FILM COATED ORAL at 11:04

## 2019-04-22 RX ADMIN — CEFTRIAXONE 1 G: 1 INJECTION, SOLUTION INTRAVENOUS at 01:04

## 2019-04-22 NOTE — PLAN OF CARE
Problem: Adult Inpatient Plan of Care  Goal: Plan of Care Review  Plan of care reviewed with pt. Pt verbalizes understanding. Bed in lowest position, side rails up times 2, wheels locked, nonslip socks on, and bed alarm on. Call bell w/in reach. Instructed to call for needs/assist oob. Pt c/o back pain. Prn tylenol and warm compresses given with good relief. Neuro checks done q 4 hrs. No deficits noted. Pt on telemetry. SR 90's-ST low 100's. No true red alarms/ectopy noted t/o shift. Will continue to monitor.

## 2019-04-22 NOTE — PLAN OF CARE
Problem: Occupational Therapy Goal  Goal: Occupational Therapy Goal  Goals to be met by: 5/18/19     Patient will increase functional independence with ADLs by performing:    LE Dressing with Supervision.  Grooming while standing with Supervision.  Toileting from toilet with Supervision for hygiene and clothing management.   Supine to sit with Supervision. --MET 4/22  Step transfer with Supervision --MET 4/22  Toilet transfer to toilet with Supervision.  Increased functional strength to WFL for self care skills and functional mobility .  Upper extremity exercise program x10 reps per handout, with independence.      Outcome: Ongoing (interventions implemented as appropriate)  Patient completing bed mobility and transfers with sup level. May benefit from HH PT upon D/C. Patient will benefit from continued skilled OT to address deficits and improve performance in functional ADL tasks. Cont OT per POC.

## 2019-04-22 NOTE — PT/OT/SLP PROGRESS
Physical Therapy Treatment    Patient Name:  Beth Flynn   MRN:  [id number]    Recommendations:     Discharge Recommendations:  home health PT   Discharge Equipment Recommendations: walker, rolling, shower chair   Barriers to discharge: Inaccessible home    Assessment:     Beth Flynn is a 63 y.o. female admitted with a medical diagnosis of Acute encephalopathy.  She presents with the following impairments/functional limitations:  weakness, impaired endurance, impaired functional mobilty, gait instability, impaired balance.  Pt ambulated ~350 with Rw and SBA/CGA with one standing rest break.  No c/o hot flashes or feeling cold today, and no diarrhea during tx.  Pt would continue to benefit from P.T. To address  Impairments listed above.    Rehab Prognosis: Good; patient would benefit from acute skilled PT services to address these deficits and reach maximum level of function.    Recent Surgery: * No surgery found *      Plan:     During this hospitalization, patient to be seen 6 x/week to address the identified rehab impairments via gait training, therapeutic activities, therapeutic exercises and progress toward the following goals:    · Plan of Care Expires:  05/18/19    Subjective     Patient/Family Comments/goals: Pt agreed to tx.  Pain/Comfort:  · Pain Rating 1: 0/10  · Pain Rating Post-Intervention 1: 0/10      Objective:     Communicated with RN (Hoang) prior to session.  Patient found supine with bed alarm upon PT entry to room.     General Precautions: Standard, fall   Orthopedic Precautions:N/A   Braces:       Functional Mobility:  · Bed Mobility:     · Scooting: modified independence  · Supine to Sit: modified independence  · Sit to Supine: modified independence  · Transfers:     · Sit to Stand:  stand by assistance with no AD and rolling walker  · Gait: 350ft with Rw and SBA/CGA with decreased pollo and one standing rest break.  See assessment above.  · Balance: sitting good, standing good-,  gait fair/fair+ Rw      AM-PAC 6 CLICK MOBILITY  Turning over in bed (including adjusting bedclothes, sheets and blankets)?: 4  Sitting down on and standing up from a chair with arms (e.g., wheelchair, bedside commode, etc.): 4  Moving from lying on back to sitting on the side of the bed?: 4  Moving to and from a bed to a chair (including a wheelchair)?: 4  Need to walk in hospital room?: 3  Climbing 3-5 steps with a railing?: 3  Basic Mobility Total Score: 22       Therapeutic Activities and Exercises:   Seated BLE therex: AP, LAQ, hip flexion/ABD/ADD and glut sets x 15 reps.   Standing marching in place with RW and SBA x 15 reps.    Patient left supine with all lines intact, call button in reach, bed alarm on and RN notified..    GOALS:   Multidisciplinary Problems     Physical Therapy Goals        Problem: Physical Therapy Goal    Goal Priority Disciplines Outcome Goal Variances Interventions   Physical Therapy Goal     PT, PT/OT Ongoing (interventions implemented as appropriate)     Description:  Goals to be met by: 2019     Patient will increase functional independence with mobility by performin. Supine to sit with [place]  2. Sit to stand transfer with [place]  3. Gait  x >200 feet with [place] using no AD.                       Time Tracking:     PT Received On: 19  PT Start Time: 1330     PT Stop Time: 1355  PT Total Time (min): 25 min     Billable Minutes: Gait Training 14 and Therapeutic Exercise 11    Treatment Type: Treatment  PT/PTA: PTA     PTA Visit Number: 2     Brittney Ordaz PTA  2019

## 2019-04-22 NOTE — PT/OT/SLP PROGRESS
Occupational Therapy   Treatment    Name: Beth Flynn  MRN: [id number]  Admitting Diagnosis:  Acute encephalopathy       Recommendations:     Discharge Recommendations: home health PT  Discharge Equipment Recommendations:  walker, rolling, shower chair  Barriers to discharge:  Inaccessible home environment(flight of stairs to enter apartment)    Assessment:   Patient completing bed mobility and transfers with sup level. May benefit from HH PT upon D/C. Patient will benefit from continued skilled OT to address deficits and improve performance in functional ADL tasks. Cont OT per POC.    Beth Flynn is a 63 y.o. female with a medical diagnosis of Acute encephalopathy. Performance deficits affecting function are weakness, impaired endurance, impaired functional mobilty, gait instability, impaired balance.     Rehab Prognosis:  Good; patient would benefit from acute skilled OT services to address these deficits and reach maximum level of function.       Plan:     Patient to be seen 5 x/week to address the above listed problems via self-care/home management, therapeutic activities, therapeutic exercises  · Plan of Care Expires: 05/18/19  · Plan of Care Reviewed with: patient    Subjective     Pain/Comfort:  · Pain Rating 1: (reported some back discomfort from old surgery)    Objective:     Communicated with: nurseKarthikeyan prior to session.  Patient found HOB elevated with bed alarm upon OT entry to room.    General Precautions: Standard, fall, contact   Orthopedic Precautions:N/A   Braces: N/A     Bed Mobility:    · Patient completed Scooting/Bridging with supervision  · Patient completed Supine to Sit with supervision     Functional Mobility/Transfers:  · Patient completed Sit <> Stand Transfer with supervision  with  rolling walker   · Patient completed Bed <> Chair Transfer using Step Transfer technique with supervision with rolling walker    Activities of Daily Living:  · N/A - completed already    [facility] 6  Click ADL: 21    Treatment & Education:  Patient reporting some back discomfort and was agreeable to OOB to chair. BEd mob as noted above. Patient ambulated in room using RW /c Sup. T/f into bedside chair -- completed chair press-ups, 2 x 5 reps.     Patient left up in chair with call button in reach, chair alarm on and nurse notifiedEducation:      GOALS:   Multidisciplinary Problems     Occupational Therapy Goals        Problem: Occupational Therapy Goal    Goal Priority Disciplines Outcome Interventions   Occupational Therapy Goal     OT, PT/OT Ongoing (interventions implemented as appropriate)    Description:  Goals to be met by: 5/18/19     Patient will increase functional independence with ADLs by performing:    LE Dressing with Supervision.  Grooming while standing with Supervision.  Toileting from toilet with Supervision for hygiene and clothing management.   Supine to sit with Supervision. --MET 4/22  Step transfer with Supervision --MET 4/22  Toilet transfer to toilet with Supervision.  Increased functional strength to WFL for self care skills and functional mobility .  Upper extremity exercise program x10 reps per handout, with independence.                       Time Tracking:     OT Date of Treatment: 04/22/19  OT Start Time: 1150  OT Stop Time: 1208  OT Total Time (min): 18 min    Billable Minutes:Therapeutic Activity 18    CARMEN Dahl  4/22/2019

## 2019-04-22 NOTE — PROGRESS NOTES
"[facility] Internal Medicine Progress Note    Primary Team: [facility] Hospitalist Team B  Attending Physician: Dylan Farris MD  Resident: Dr. Amaro  Intern: Dr. Dunn    Subjective:      Pt awake laying flat in bed with lights off. Complaints of "hot flashes/chills," diarrhea for past 2 days, reports 5 episodes in last 24hrs, last episode this AM, but stool was flushed. Crampy abdominal pain when having BM. Denies nausea/emesis. Discussed plan to send sample for testing to rule out infection, pt said if she has no more episodes she feels ok to be discharged.     Objective:     Last 24 Hour Vital Signs:  BP  Min: 112/62  Max: 140/62  Temp  Av.9 °F (36.1 °C)  Min: 96.1 °F (35.6 °C)  Max: 97.6 °F (36.4 °C)  Pulse  Av.4  Min: 89  Max: 111  Resp  Av.7  Min: 16  Max: 20  SpO2  Av.5 %  Min: 94 %  Max: 99 %  Weight  Av.2 kg (163 lb 9.3 oz)  Min: 74.2 kg (163 lb 9.3 oz)  Max: 74.2 kg (163 lb 9.3 oz)  I/O last 3 completed shifts:  In: 1380 [P.O.:330; I.V.:1000; IV Piggyback:50]  Out: 2000 [Urine:2000]    Physical Examination:  Gen: well developed, well nourished, wet washcloth on forehead  Head: normocephalic, atraumatic  Eyes: clear conjunctiva, non-icteric sclera  Neck: symmetric, supple, no thyromegaly, masses or lymphadenopathy  CV: regular rate & rhythm, clear S1 & S2  Resp: normal work of breathing on RA, clear breath sounds in all fields, no wheezes, rhonchi or crackles  Abd: positive bowel sounds, non-distended, soft, mild epigastric tenderness  Ext: warm & well perfused, 2+ radial pulses, no peripheral edema  Msk: spine midline, no joint swelling, erythema or tenderness  Neuro: AA&Ox3, appropriate to conversation, distal sensation intact, normal coordination  Skin: normal color & turgor, no obvious lesions or eruptions    Laboratory:  Trended Lab Data:  Recent Labs   Lab 19  0516 19  0641 19  0557 19  0558   WBC 10.32 11.53  --  10.78   HGB 11.8* 11.2*  --  11.6*   HCT 35.0* " 33.8*  --  34.3*    290  --  341   MCV 86 87  --  86   RDW 12.2 12.3  --  12.2    139 139  --    K 3.7 3.7 3.5  --     105 106  --    CO2 23 26 23  --    BUN 14 13 9  --    CREATININE 0.6 0.7 0.7  --    * 146* 180*  --    PROT 6.5 6.5 6.7  --    ALBUMIN 2.4* 2.4* 2.5*  --    BILITOT 0.3 0.3 0.3  --    AST 23 26 26  --    ALKPHOS 118 126 134  --    ALT 25 31 35  --        Trended Cardiac Data:  No results for input(s): TROPONINI, CKTOTAL, CKMB, BNP in the last 168 hours.    Microbiology:  Urine Cx 4/17: >100k CFU E coli (resistent to amp, bactrim)  Blood Cx 4/18: E. Coli (resistent to amp, bactrim)  Blood Cx 4/19: NGTD    Antibiotics and Day Number of Therapy:  Rocephin (4/17-present)    Cardiac Studies:  EKG:  - No new studies    Radiology:  CXR 4/17/19:  1. Hypoventilatory exam accentuates the interstitial attenuation, differential would include congestive change/early edema.  Correlation advised.  Obscuration of the left costophrenic angle is likely on the basis of overlying soft tissue, pleural fluid felt less likely.    CT Head 4/17/19:  No acute intracranial abnormality detected.    Current Medications:     Infusions:       Scheduled:   cefTRIAXone (ROCEPHIN) IVPB  1 g Intravenous Q24H    heparin (porcine)  5,000 Units Subcutaneous Q8H    simvastatin  40 mg Oral QHS        PRN:  acetaminophen, dextrose 50 % in water (D50W), dextrose 50 % in water (D50W), dextrose 50 % in water (D50W), dextrose 50 % in water (D50W), glucagon (human recombinant), glucagon (human recombinant), glucose, glucose, glucose, glucose, insulin aspart U-100, sodium chloride 0.9%      Assessment:     Beth Flynn is a 63 y.o.female with    Plan:     #Acute Diarrhea:  Pt reports diarrhea x 2 days since admission; 5 episodes in last 24hrs; pt has been receiving IV abx for several days.  - C diff panel ordered to r/o acute infection    #Severe sepsis 2/2 E coli UTI & Bacteremia:  Patient brought into ED with  "one day of drowsiness and one hour of somnolence/confusion. GCS of 15 on arrival. Patient endorsing subjective F/C and dysuria for 2 days. On admission, patient with 4 SIRS criteria: HR 92, RR 24, WBC 19.9, LA 2.3. Procal 1.51. Urinalysis with many bacteria, WBC >100, +nitrite. Started on Rocephin empirically. Urine Cx 4/17 with E coli. 1/2 Blood Cx 4/17 with E coli. Repeat Blood Cx 4/19 NGTD (>48hrs).   - Continue Rocephin. De-escalate to Augmentin on discharge to complete 14 day course for bacteremia (end date 2/3).     #Acute Infectious vs Metabolic Encephalopathy 2/2 Sepsis vs Polypharmacy:  Unable to reach collateral, but per ED note: 1 day of increased drowsiness and one hour of somnolence and confusion. Found next to unlabeled, empty pill bottle. CT head without acute abnormality. Received medication list from her pharmacy and includes multiple pain medications, flexeril, and benzos. Initially concerns for overdose but patient denies and denies SI/HI. Poison control initially notified due to concerns for overdose but recommended supportive care.  Etiology of AMS likely multifactorial including sepsis and polypharmacy.  - Antibiotics for UTI, bacteremia as above.   - Avoid sedating meds.   - Patient with reports of "seizures" and "twitching". Nothing noted since admission. CT head negative. Not on any seizure meds. Never seen neurologist. Will refer to neurology as outpatient.      #Type 2 DM: Home Meds: Metformin 500mg BID. A1C 6.4.   - Hold home Metformin  - Accuchecks q6, SSI while IP     #Depression: Home Meds: Aripriprazole 10mg daily, Duloxetine 60mg daily, Klonopin 1mg BID. Initially concerns for overdose, patient denies OD, denies SI/HI.   - Holding home meds.     Fluids: None  Electrolytes: wnl  Nutrition: Cardiac  DVT Ppx: Heparin  Code Status: Full    Dispo: discharge home pending resolution of diarrhea     Chaim Dunn MD  [facility] Internal Medicine HO-1  [facility] Hospitalist Medicine Team B    [facility] " Medicine Hospitalist Pager numbers:   [facility] Hospitalist Medicine Team A (Madhavi/Faby): [phone]  [facility] Hospitalist Medicine Team B (Kaleigh/Rojelio):  [phone]

## 2019-04-22 NOTE — PLAN OF CARE
Problem: Physical Therapy Goal  Goal: Physical Therapy Goal  Goals to be met by: 2019     Patient will increase functional independence with mobility by performin. Supine to sit with [place]  2. Sit to stand transfer with [place]  3. Gait  x >200 feet with [place] using no AD.      Outcome: Ongoing (interventions implemented as appropriate)  Continue working toward goals.

## 2019-04-23 LAB — BACTERIA BLD CULT: NORMAL

## 2019-04-23 NOTE — PLAN OF CARE
Problem: Adult Inpatient Plan of Care  Goal: Plan of Care Review  Outcome: Ongoing (interventions implemented as appropriate)     04/22/19 1954   Plan of Care Review   Plan of Care Reviewed With patient   Progress improving   Pt VSS, weaned off O2 NC. EVELYN telesitter at bedside, pt constantly reminded to call for assistance, bedalarm on. Pt was placed in contact precautions due to diarrhea, stool sample sent down. Plan for discharge home today once stool samples results are reviewed. PO antibiotics ordered for discharge. Granddaughter came today to visit. Pt worked with PT/OT today. Will continue to monitor.

## 2019-04-23 NOTE — PLAN OF CARE
04/23/19 0746   Final Note   Assessment Type Final Discharge Note   Anticipated Discharge Disposition Home   Hospital Follow Up  Appt(s) scheduled? Yes   Discharge plans and expectations educations in teach back method with documentation complete? Yes   Right Care Referral Info   Post Acute Recommendation No Care     PCP appt booked and placed in AVS.

## 2019-04-24 LAB — BACTERIA BLD CULT: NORMAL

## 2019-05-11 NOTE — DISCHARGE SUMMARY
[facility] Medicine Discharge Summary    Primary Team: [facility] Medicine Service Firm B   Attending Physician: Dr. Farris  Resident: Sondra  Intern: Shaun    Date of Admit: 4/17/2019  Date of Discharge: 4/22/2019    Discharge to: home  Condition: good/stable    Discharge Diagnoses     Patient Active Problem List   Diagnosis    Urinary tract infection with hematuria    Depression    Type 2 diabetes mellitus    Acute diarrhea       Consultants and Procedures     Consultants:  None    Procedures:   None    Brief History of Present Illness      Beth Flynn is a 63 y.o. female who  has a past medical history of Depression and GERD (gastroesophageal reflux disease). The patient presented to [facility] on 4/17/2019 with a primary complaint of Altered Mental Status for one hour.     The patient is very somnolent on interview and not able to provide much history. Patient was brought in by granddaughter, Megan Flynn, but she is no longer at bedside and unable to be reached by phone. Majority of history is gathered from chart and ED documentation.     According to ED note:  Per granddaughter, patient was in her USOH - oriented x 4, independent in ADLs, ambulates without walker or assistance at baseline - until morning of arrival, when she noticed that the patient was groggy and sleeping more than usual. Granddaughter reported that her aunt found the patient sleeping next to an unmarked pill bottle and brought the patient into ED with concern for an accidental overdose of Vicodin and Clonidine. Patient has a documented PMHx of diagnosed depression. Patient was somnolent but easily aroused and oriented x 2. She was given narcan x 1 and showed some improvement.      On my interview, patient is still very somnolent and in and out of consciousness but answering questions appropriately and oriented to self, place, and month. Although, she is unable to tell me why she was brought into  hospital. She denies taking any medication tonight or any SI. She states she has had a sore throat and has been hoarse for 4 days. She also endorses fever and chills the past 2 days along with increased frequency and burning with urination. She denies any cough but reports intermittent SOB for the last few days. She denies any skin rashes or breakdown that she has noticed. She denies any abdominal pain, fullness, chest pain, palpitations, nausea, diarrhea, or constipation.    For the full HPI please refer to the History & Physical from this admission.    Hospital Course By Problem with Pertinent Findings     Acute Diarrhea:  Pt reports diarrhea x 2 days since admission; 5 episodes in last 24hrs; pt has been receiving IV abx for several days.  - C diff panel negative.      Severe sepsis 2/2 E coli UTI & Bacteremia:  Patient brought into ED with one day of drowsiness and one hour of somnolence/confusion. GCS of 15 on arrival. Patient endorsing subjective F/C and dysuria for 2 days. On admission, patient with 4 SIRS criteria: HR 92, RR 24, WBC 19.9, LA 2.3. Procal 1.51. Urinalysis with many bacteria, WBC >100, +nitrite. Started on Rocephin empirically. Urine Cx 4/17 with E coli. 1/2 Blood Cx 4/17 with E coli. Repeat Blood Cx 4/19 NGTD (>48hrs).   - Continue Rocephin. De-escalate to Augmentin on discharge to complete 14 day course for bacteremia (end date 2/3).     Acute Infectious vs Metabolic Encephalopathy 2/2 Sepsis vs Polypharmacy:  Unable to reach collateral, but per ED note: 1 day of increased drowsiness and one hour of somnolence and confusion. Found next to unlabeled, empty pill bottle. CT head without acute abnormality. Received medication list from her pharmacy and includes multiple pain medications, flexeril, and benzos. Initially concerns for overdose but patient denies and denies SI/HI. Poison control initially notified due to concerns for overdose but recommended supportive care.  Etiology of AMS likely  "multifactorial including sepsis and polypharmacy.  - Antibiotics for UTI, bacteremia as above.   - Avoid sedating meds.   - Patient with reports of "seizures" and "twitching". Nothing noted since admission. CT head negative. Not on any seizure meds. Never seen neurologist. Will refer to neurology as outpatient.      Type 2 DM: Home Meds: Metformin 500mg BID. A1C 6.4.   - Hold home Metformin, continue on discharge   - Accuchecks q6, SSI while IP     Depression: Home Meds: Aripriprazole 10mg daily, Duloxetine 60mg daily, Klonopin 1mg BID. Initially concerns for overdose, patient denies OD, denies SI/HI.   - Holding home meds, continue ton discharge    Discharge Medications        Medication List      CONTINUE taking these medications    ARIPiprazole 10 MG Tab  Commonly known as:  ABILIFY     benztropine 0.5 MG tablet  Commonly known as:  COGENTIN     donepezil 10 MG tablet  Commonly known as:  ARICEPT     DULoxetine 60 MG capsule  Commonly known as:  CYMBALTA     furosemide 20 MG tablet  Commonly known as:  LASIX     metFORMIN 500 MG tablet  Commonly known as:  GLUCOPHAGE     omeprazole 40 MG capsule  Commonly known as:  PRILOSEC     simvastatin 40 MG tablet  Commonly known as:  ZOCOR        STOP taking these medications    clonazePAM 1 MG tablet  Commonly known as:  KLONOPIN     cyclobenzaprine 10 MG tablet  Commonly known as:  FLEXERIL     ibuprofen 600 MG tablet  Commonly known as:  ADVIL,MOTRIN     meloxicam 7.5 MG tablet  Commonly known as:  MOBIC        ASK your doctor about these medications    amoxicillin-clavulanate 875-125mg 875-125 mg per tablet  Commonly known as:  AUGMENTIN  Take 1 tablet by mouth every 12 (twelve) hours. for 10 days  Ask about: Should I take this medication?           Where to Get Your Medications      These medications were sent to [facility]  [address], [place] [address]    Hours:  Mon-Fri, 8a-5:30p Phone:  [phone]   · amoxicillin-clavulanate 875-125mg " 875-125 mg per tablet         Discharge Information:   Diet:  Diabetic     Physical Activity:  Regular, as tolerated     Instructions:  1. Take all medications as prescribed  2. Keep all follow-up appointments  3. Return to the hospital or call your primary care physicians if any worsening symptoms such as fever, chills, nausea, vomiting, confusion, fatigue, abdominal pain, diarrhea occur.    Follow-Up Appointments:  Dr. Aaron Watters (PCP) - within 1 week for hospital follow up       Tawana Nieto  [facility] Internal Medicine, \[facility]\""

## 2021-05-22 ENCOUNTER — HOSPITAL ENCOUNTER (EMERGENCY)
Facility: HOSPITAL | Age: 66
Discharge: HOME | End: 2021-05-23
Attending: EMERGENCY MEDICINE
Payer: MEDICAID

## 2021-05-22 ENCOUNTER — OFFICE VISIT (OUTPATIENT)
Dept: URGENT CARE | Facility: CLINIC | Age: 66
End: 2021-05-22
Payer: MEDICARE

## 2021-05-22 VITALS
HEIGHT: 63 IN | HEART RATE: 89 BPM | BODY MASS INDEX: 28.88 KG/M2 | SYSTOLIC BLOOD PRESSURE: 140 MMHG | RESPIRATION RATE: 16 BRPM | TEMPERATURE: 98 F | OXYGEN SATURATION: 96 % | WEIGHT: 163 LBS | DIASTOLIC BLOOD PRESSURE: 80 MMHG

## 2021-05-22 DIAGNOSIS — R10.31 RIGHT LOWER QUADRANT ABDOMINAL PAIN: Primary | ICD-10-CM

## 2021-05-22 LAB
ALBUMIN SERPL BCP-MCNC: 4 G/DL (ref 3.5–5.2)
ALP SERPL-CCNC: 80 U/L (ref 55–135)
ALT SERPL W/O P-5'-P-CCNC: 20 U/L (ref 10–44)
ANION GAP SERPL CALC-SCNC: 12 MMOL/L (ref 8–16)
AST SERPL-CCNC: 19 U/L (ref 10–40)
BASOPHILS # BLD AUTO: 0.05 K/UL (ref 0–0.2)
BASOPHILS NFR BLD: 0.6 % (ref 0–1.9)
BILIRUB SERPL-MCNC: 0.4 MG/DL (ref 0.1–1)
BILIRUB UR QL STRIP: NEGATIVE
BUN SERPL-MCNC: 14 MG/DL (ref 8–23)
CALCIUM SERPL-MCNC: 8.6 MG/DL (ref 8.7–10.5)
CHLORIDE SERPL-SCNC: 105 MMOL/L (ref 95–110)
CLARITY UR: CLEAR
CO2 SERPL-SCNC: 25 MMOL/L (ref 23–29)
COLOR UR: YELLOW
CREAT SERPL-MCNC: 0.7 MG/DL (ref 0.5–1.4)
DIFFERENTIAL METHOD: ABNORMAL
EOSINOPHIL # BLD AUTO: 0.1 K/UL (ref 0–0.5)
EOSINOPHIL NFR BLD: 1.1 % (ref 0–8)
ERYTHROCYTE [DISTWIDTH] IN BLOOD BY AUTOMATED COUNT: 12.2 % (ref 11.5–14.5)
EST. GFR  (AFRICAN AMERICAN): >60 ML/MIN/1.73 M^2
EST. GFR  (NON AFRICAN AMERICAN): >60 ML/MIN/1.73 M^2
GLUCOSE SERPL-MCNC: 116 MG/DL (ref 70–110)
GLUCOSE UR QL STRIP: NEGATIVE
HCT VFR BLD AUTO: 37.2 % (ref 37–48.5)
HGB BLD-MCNC: 12.7 G/DL (ref 12–16)
HGB UR QL STRIP: NEGATIVE
IMM GRANULOCYTES # BLD AUTO: 0.07 K/UL (ref 0–0.04)
IMM GRANULOCYTES NFR BLD AUTO: 0.9 % (ref 0–0.5)
KETONES UR QL STRIP: NEGATIVE
LEUKOCYTE ESTERASE UR QL STRIP: NEGATIVE
LYMPHOCYTES # BLD AUTO: 3 K/UL (ref 1–4.8)
LYMPHOCYTES NFR BLD: 38.6 % (ref 18–48)
MCH RBC QN AUTO: 30 PG (ref 27–31)
MCHC RBC AUTO-ENTMCNC: 34.1 G/DL (ref 32–36)
MCV RBC AUTO: 88 FL (ref 82–98)
MONOCYTES # BLD AUTO: 0.4 K/UL (ref 0.3–1)
MONOCYTES NFR BLD: 5.2 % (ref 4–15)
NEUTROPHILS # BLD AUTO: 4.2 K/UL (ref 1.8–7.7)
NEUTROPHILS NFR BLD: 53.6 % (ref 38–73)
NITRITE UR QL STRIP: NEGATIVE
NRBC BLD-RTO: 0 /100 WBC
PH UR STRIP: 6 [PH] (ref 5–8)
PLATELET # BLD AUTO: 296 K/UL (ref 150–450)
PMV BLD AUTO: 9 FL (ref 9.2–12.9)
POTASSIUM SERPL-SCNC: 3.5 MMOL/L (ref 3.5–5.1)
PROT SERPL-MCNC: 6.9 G/DL (ref 6–8.4)
PROT UR QL STRIP: NEGATIVE
RBC # BLD AUTO: 4.23 M/UL (ref 4–5.4)
SODIUM SERPL-SCNC: 142 MMOL/L (ref 136–145)
SP GR UR STRIP: 1.01 (ref 1–1.03)
URN SPEC COLLECT METH UR: NORMAL
UROBILINOGEN UR STRIP-ACNC: NEGATIVE EU/DL
WBC # BLD AUTO: 7.88 K/UL (ref 3.9–12.7)

## 2021-05-22 PROCEDURE — 99213 PR OFFICE/OUTPT VISIT, EST, LEVL III, 20-29 MIN: ICD-10-PCS | Mod: S$GLB,,, | Performed by: FAMILY MEDICINE

## 2021-05-22 PROCEDURE — 63600175 PHARM REV CODE 636 W HCPCS: Performed by: EMERGENCY MEDICINE

## 2021-05-22 PROCEDURE — 96374 THER/PROPH/DIAG INJ IV PUSH: CPT

## 2021-05-22 PROCEDURE — 81003 URINALYSIS AUTO W/O SCOPE: CPT | Performed by: EMERGENCY MEDICINE

## 2021-05-22 PROCEDURE — 99213 OFFICE O/P EST LOW 20 MIN: CPT | Mod: S$GLB,,, | Performed by: FAMILY MEDICINE

## 2021-05-22 PROCEDURE — 99285 EMERGENCY DEPT VISIT HI MDM: CPT | Mod: 25

## 2021-05-22 PROCEDURE — 80053 COMPREHEN METABOLIC PANEL: CPT | Performed by: EMERGENCY MEDICINE

## 2021-05-22 PROCEDURE — 85025 COMPLETE CBC W/AUTO DIFF WBC: CPT | Performed by: EMERGENCY MEDICINE

## 2021-05-22 RX ORDER — NAPROXEN 500 MG/1
500 TABLET ORAL 2 TIMES DAILY PRN
Qty: 30 TABLET | Refills: 0 | Status: SHIPPED | OUTPATIENT
Start: 2021-05-22 | End: 2023-04-13 | Stop reason: CLARIF

## 2021-05-22 RX ORDER — ONDANSETRON 4 MG/1
4 TABLET, ORALLY DISINTEGRATING ORAL EVERY 8 HOURS PRN
Qty: 10 TABLET | Refills: 0 | Status: SHIPPED | OUTPATIENT
Start: 2021-05-22 | End: 2021-05-29

## 2021-05-22 RX ORDER — KETOROLAC TROMETHAMINE 30 MG/ML
15 INJECTION, SOLUTION INTRAMUSCULAR; INTRAVENOUS
Status: COMPLETED | OUTPATIENT
Start: 2021-05-22 | End: 2021-05-22

## 2021-05-22 RX ADMIN — IOHEXOL 75 ML: 350 INJECTION, SOLUTION INTRAVENOUS at 11:05

## 2021-05-22 RX ADMIN — KETOROLAC TROMETHAMINE 15 MG: 30 INJECTION, SOLUTION INTRAMUSCULAR at 10:05

## 2021-05-22 RX ADMIN — SODIUM CHLORIDE, SODIUM LACTATE, POTASSIUM CHLORIDE, AND CALCIUM CHLORIDE 1000 ML: .6; .31; .03; .02 INJECTION, SOLUTION INTRAVENOUS at 10:05

## 2021-05-23 VITALS
TEMPERATURE: 98 F | DIASTOLIC BLOOD PRESSURE: 89 MMHG | HEART RATE: 78 BPM | OXYGEN SATURATION: 98 % | HEIGHT: 63 IN | BODY MASS INDEX: 30.12 KG/M2 | WEIGHT: 170 LBS | RESPIRATION RATE: 18 BRPM | SYSTOLIC BLOOD PRESSURE: 147 MMHG

## 2021-05-23 PROCEDURE — 25500020 PHARM REV CODE 255: Performed by: EMERGENCY MEDICINE

## 2021-05-24 ENCOUNTER — PES CALL (OUTPATIENT)
Dept: ADMINISTRATIVE | Facility: CLINIC | Age: 66
End: 2021-05-24

## 2021-09-05 ENCOUNTER — OFFICE VISIT (OUTPATIENT)
Dept: FAMILY MEDICINE | Facility: CLINIC | Age: 66
End: 2021-09-05
Payer: MEDICAID

## 2021-09-05 ENCOUNTER — HOSPITAL ENCOUNTER (EMERGENCY)
Facility: HOSPITAL | Age: 66
Discharge: HOME | End: 2021-09-05
Attending: EMERGENCY MEDICINE
Payer: MEDICARE

## 2021-09-05 VITALS
HEIGHT: 63 IN | WEIGHT: 170 LBS | TEMPERATURE: 98 F | BODY MASS INDEX: 30.12 KG/M2 | SYSTOLIC BLOOD PRESSURE: 107 MMHG | OXYGEN SATURATION: 97 % | RESPIRATION RATE: 20 BRPM | DIASTOLIC BLOOD PRESSURE: 55 MMHG | HEART RATE: 88 BPM

## 2021-09-05 VITALS
DIASTOLIC BLOOD PRESSURE: 86 MMHG | HEART RATE: 107 BPM | OXYGEN SATURATION: 98 % | TEMPERATURE: 98 F | SYSTOLIC BLOOD PRESSURE: 130 MMHG

## 2021-09-05 DIAGNOSIS — E86.0 DEHYDRATION: ICD-10-CM

## 2021-09-05 DIAGNOSIS — R42 DIZZINESS: Primary | ICD-10-CM

## 2021-09-05 DIAGNOSIS — E86.0 MODERATE DEHYDRATION: Primary | ICD-10-CM

## 2021-09-05 DIAGNOSIS — E11.65 TYPE 2 DIABETES MELLITUS WITH HYPERGLYCEMIA, WITHOUT LONG-TERM CURRENT USE OF INSULIN: ICD-10-CM

## 2021-09-05 LAB
ALBUMIN SERPL BCP-MCNC: 3.6 G/DL (ref 3.5–5.2)
ALP SERPL-CCNC: 101 U/L (ref 55–135)
ALT SERPL W/O P-5'-P-CCNC: 23 U/L (ref 10–44)
ANION GAP SERPL CALC-SCNC: 12 MMOL/L (ref 8–16)
AST SERPL-CCNC: 27 U/L (ref 10–40)
BASOPHILS # BLD AUTO: 0.04 K/UL (ref 0–0.2)
BASOPHILS NFR BLD: 0.6 % (ref 0–1.9)
BILIRUB SERPL-MCNC: 0.5 MG/DL (ref 0.1–1)
BUN SERPL-MCNC: 13 MG/DL (ref 8–23)
CALCIUM SERPL-MCNC: 9.1 MG/DL (ref 8.7–10.5)
CHLORIDE SERPL-SCNC: 108 MMOL/L (ref 95–110)
CO2 SERPL-SCNC: 20 MMOL/L (ref 23–29)
CREAT SERPL-MCNC: 0.7 MG/DL (ref 0.5–1.4)
CTP QC/QA: YES
DIFFERENTIAL METHOD: ABNORMAL
EOSINOPHIL # BLD AUTO: 0.1 K/UL (ref 0–0.5)
EOSINOPHIL NFR BLD: 0.8 % (ref 0–8)
ERYTHROCYTE [DISTWIDTH] IN BLOOD BY AUTOMATED COUNT: 12.9 % (ref 11.5–14.5)
EST. GFR  (AFRICAN AMERICAN): >60 ML/MIN/1.73 M^2
EST. GFR  (NON AFRICAN AMERICAN): >60 ML/MIN/1.73 M^2
GLUCOSE SERPL-MCNC: 148 MG/DL (ref 70–110)
HCT VFR BLD AUTO: 40.5 % (ref 37–48.5)
HGB BLD-MCNC: 13.2 G/DL (ref 12–16)
IMM GRANULOCYTES # BLD AUTO: 0.06 K/UL (ref 0–0.04)
IMM GRANULOCYTES NFR BLD AUTO: 0.8 % (ref 0–0.5)
LYMPHOCYTES # BLD AUTO: 2.7 K/UL (ref 1–4.8)
LYMPHOCYTES NFR BLD: 38.2 % (ref 18–48)
MAGNESIUM SERPL-MCNC: 1.9 MG/DL (ref 1.6–2.6)
MCH RBC QN AUTO: 29.2 PG (ref 27–31)
MCHC RBC AUTO-ENTMCNC: 32.6 G/DL (ref 32–36)
MCV RBC AUTO: 90 FL (ref 82–98)
MONOCYTES # BLD AUTO: 0.4 K/UL (ref 0.3–1)
MONOCYTES NFR BLD: 5.9 % (ref 4–15)
NEUTROPHILS # BLD AUTO: 3.8 K/UL (ref 1.8–7.7)
NEUTROPHILS NFR BLD: 53.7 % (ref 38–73)
NRBC BLD-RTO: 0 /100 WBC
PLATELET # BLD AUTO: 347 K/UL (ref 150–450)
PMV BLD AUTO: 8.8 FL (ref 9.2–12.9)
POCT GLUCOSE: 102 MG/DL (ref 70–110)
POTASSIUM SERPL-SCNC: 3.8 MMOL/L (ref 3.5–5.1)
PROT SERPL-MCNC: 7.7 G/DL (ref 6–8.4)
RBC # BLD AUTO: 4.52 M/UL (ref 4–5.4)
SARS-COV-2 RDRP RESP QL NAA+PROBE: NEGATIVE
SODIUM SERPL-SCNC: 140 MMOL/L (ref 136–145)
WBC # BLD AUTO: 7.06 K/UL (ref 3.9–12.7)

## 2021-09-05 PROCEDURE — 85025 COMPLETE CBC W/AUTO DIFF WBC: CPT | Performed by: EMERGENCY MEDICINE

## 2021-09-05 PROCEDURE — 93010 EKG 12-LEAD: ICD-10-PCS | Mod: ,,, | Performed by: INTERNAL MEDICINE

## 2021-09-05 PROCEDURE — 93010 ELECTROCARDIOGRAM REPORT: CPT | Mod: ,,, | Performed by: INTERNAL MEDICINE

## 2021-09-05 PROCEDURE — 99999 PR PBB SHADOW E&M-EST. PATIENT-LVL II: ICD-10-PCS | Mod: PBBFAC,,, | Performed by: FAMILY MEDICINE

## 2021-09-05 PROCEDURE — 80053 COMPREHEN METABOLIC PANEL: CPT | Performed by: EMERGENCY MEDICINE

## 2021-09-05 PROCEDURE — 99213 OFFICE O/P EST LOW 20 MIN: CPT | Mod: S$GLB,,, | Performed by: FAMILY MEDICINE

## 2021-09-05 PROCEDURE — 99999 PR PBB SHADOW E&M-EST. PATIENT-LVL II: CPT | Mod: PBBFAC,,, | Performed by: FAMILY MEDICINE

## 2021-09-05 PROCEDURE — 93005 ELECTROCARDIOGRAM TRACING: CPT

## 2021-09-05 PROCEDURE — 99213 PR OFFICE/OUTPT VISIT, EST, LEVL III, 20-29 MIN: ICD-10-PCS | Mod: S$GLB,,, | Performed by: FAMILY MEDICINE

## 2021-09-05 PROCEDURE — 99212 OFFICE O/P EST SF 10 MIN: CPT | Mod: PBBFAC,27,PO | Performed by: FAMILY MEDICINE

## 2021-09-05 PROCEDURE — 99284 EMERGENCY DEPT VISIT MOD MDM: CPT | Mod: 25

## 2021-09-05 PROCEDURE — 83735 ASSAY OF MAGNESIUM: CPT | Performed by: EMERGENCY MEDICINE

## 2021-09-05 PROCEDURE — 82962 GLUCOSE BLOOD TEST: CPT

## 2021-09-05 PROCEDURE — U0002 COVID-19 LAB TEST NON-CDC: HCPCS | Performed by: EMERGENCY MEDICINE

## 2021-09-05 PROCEDURE — 25000003 PHARM REV CODE 250: Performed by: EMERGENCY MEDICINE

## 2021-09-05 RX ORDER — SODIUM CHLORIDE 9 MG/ML
INJECTION, SOLUTION INTRAVENOUS
Status: COMPLETED | OUTPATIENT
Start: 2021-09-05 | End: 2021-09-05

## 2021-09-05 RX ADMIN — SODIUM CHLORIDE: 0.9 INJECTION, SOLUTION INTRAVENOUS at 04:09

## 2022-04-17 ENCOUNTER — HOSPITAL ENCOUNTER (EMERGENCY)
Facility: HOSPITAL | Age: 67
Discharge: HOME | End: 2022-04-17
Attending: EMERGENCY MEDICINE
Payer: MEDICARE

## 2022-04-17 VITALS
OXYGEN SATURATION: 96 % | RESPIRATION RATE: 16 BRPM | SYSTOLIC BLOOD PRESSURE: 160 MMHG | BODY MASS INDEX: 30.11 KG/M2 | HEIGHT: 63 IN | TEMPERATURE: 98 F | DIASTOLIC BLOOD PRESSURE: 77 MMHG | HEART RATE: 107 BPM

## 2022-04-17 DIAGNOSIS — R11.2 NON-INTRACTABLE VOMITING WITH NAUSEA, UNSPECIFIED VOMITING TYPE: Primary | ICD-10-CM

## 2022-04-17 DIAGNOSIS — K21.00 GASTROESOPHAGEAL REFLUX DISEASE WITH ESOPHAGITIS WITHOUT HEMORRHAGE: ICD-10-CM

## 2022-04-17 DIAGNOSIS — R11.10 VOMITING: ICD-10-CM

## 2022-04-17 LAB
ALBUMIN SERPL BCP-MCNC: 4.7 G/DL (ref 3.5–5.2)
ALP SERPL-CCNC: 95 U/L (ref 55–135)
ALT SERPL W/O P-5'-P-CCNC: 30 U/L (ref 10–44)
ANION GAP SERPL CALC-SCNC: 16 MMOL/L (ref 8–16)
AST SERPL-CCNC: 21 U/L (ref 10–40)
BASOPHILS # BLD AUTO: 0.03 K/UL (ref 0–0.2)
BASOPHILS NFR BLD: 0.2 % (ref 0–1.9)
BILIRUB SERPL-MCNC: 0.8 MG/DL (ref 0.1–1)
BILIRUB UR QL STRIP: NEGATIVE
BUN SERPL-MCNC: 11 MG/DL (ref 8–23)
CALCIUM SERPL-MCNC: 10.3 MG/DL (ref 8.7–10.5)
CHLORIDE SERPL-SCNC: 95 MMOL/L (ref 95–110)
CLARITY UR: CLEAR
CO2 SERPL-SCNC: 26 MMOL/L (ref 23–29)
COLOR UR: COLORLESS
CREAT SERPL-MCNC: 0.8 MG/DL (ref 0.5–1.4)
DIFFERENTIAL METHOD: ABNORMAL
EOSINOPHIL # BLD AUTO: 0 K/UL (ref 0–0.5)
EOSINOPHIL NFR BLD: 0 % (ref 0–8)
ERYTHROCYTE [DISTWIDTH] IN BLOOD BY AUTOMATED COUNT: 12.2 % (ref 11.5–14.5)
EST. GFR  (AFRICAN AMERICAN): >60 ML/MIN/1.73 M^2
EST. GFR  (NON AFRICAN AMERICAN): >60 ML/MIN/1.73 M^2
GLUCOSE SERPL-MCNC: 182 MG/DL (ref 70–110)
GLUCOSE UR QL STRIP: ABNORMAL
HCT VFR BLD AUTO: 44.4 % (ref 37–48.5)
HGB BLD-MCNC: 15.3 G/DL (ref 12–16)
HGB UR QL STRIP: NEGATIVE
IMM GRANULOCYTES # BLD AUTO: 0.07 K/UL (ref 0–0.04)
IMM GRANULOCYTES NFR BLD AUTO: 0.5 % (ref 0–0.5)
KETONES UR QL STRIP: ABNORMAL
LEUKOCYTE ESTERASE UR QL STRIP: NEGATIVE
LIPASE SERPL-CCNC: 11 U/L (ref 4–60)
LYMPHOCYTES # BLD AUTO: 2.2 K/UL (ref 1–4.8)
LYMPHOCYTES NFR BLD: 15.5 % (ref 18–48)
MCH RBC QN AUTO: 28.8 PG (ref 27–31)
MCHC RBC AUTO-ENTMCNC: 34.5 G/DL (ref 32–36)
MCV RBC AUTO: 84 FL (ref 82–98)
MONOCYTES # BLD AUTO: 0.6 K/UL (ref 0.3–1)
MONOCYTES NFR BLD: 4.3 % (ref 4–15)
NEUTROPHILS # BLD AUTO: 11.4 K/UL (ref 1.8–7.7)
NEUTROPHILS NFR BLD: 79.5 % (ref 38–73)
NITRITE UR QL STRIP: NEGATIVE
NRBC BLD-RTO: 0 /100 WBC
PH UR STRIP: 7 [PH] (ref 5–8)
PLATELET # BLD AUTO: 369 K/UL (ref 150–450)
PMV BLD AUTO: 8.7 FL (ref 9.2–12.9)
POCT GLUCOSE: 185 MG/DL (ref 70–110)
POTASSIUM SERPL-SCNC: 3.3 MMOL/L (ref 3.5–5.1)
PROT SERPL-MCNC: 8.5 G/DL (ref 6–8.4)
PROT UR QL STRIP: NEGATIVE
RBC # BLD AUTO: 5.31 M/UL (ref 4–5.4)
SODIUM SERPL-SCNC: 137 MMOL/L (ref 136–145)
SP GR UR STRIP: 1 (ref 1–1.03)
TROPONIN I SERPL DL<=0.01 NG/ML-MCNC: <0.006 NG/ML (ref 0–0.03)
URN SPEC COLLECT METH UR: ABNORMAL
UROBILINOGEN UR STRIP-ACNC: NEGATIVE EU/DL
WBC # BLD AUTO: 14.31 K/UL (ref 3.9–12.7)

## 2022-04-17 PROCEDURE — 80053 COMPREHEN METABOLIC PANEL: CPT | Performed by: EMERGENCY MEDICINE

## 2022-04-17 PROCEDURE — 93005 ELECTROCARDIOGRAM TRACING: CPT

## 2022-04-17 PROCEDURE — 83690 ASSAY OF LIPASE: CPT | Performed by: EMERGENCY MEDICINE

## 2022-04-17 PROCEDURE — 93010 EKG 12-LEAD: ICD-10-PCS | Mod: ,,, | Performed by: INTERNAL MEDICINE

## 2022-04-17 PROCEDURE — C9113 INJ PANTOPRAZOLE SODIUM, VIA: HCPCS | Performed by: EMERGENCY MEDICINE

## 2022-04-17 PROCEDURE — 25000003 PHARM REV CODE 250: Performed by: EMERGENCY MEDICINE

## 2022-04-17 PROCEDURE — 25500020 PHARM REV CODE 255: Performed by: EMERGENCY MEDICINE

## 2022-04-17 PROCEDURE — 85025 COMPLETE CBC W/AUTO DIFF WBC: CPT | Performed by: EMERGENCY MEDICINE

## 2022-04-17 PROCEDURE — 82962 GLUCOSE BLOOD TEST: CPT

## 2022-04-17 PROCEDURE — 93010 ELECTROCARDIOGRAM REPORT: CPT | Mod: ,,, | Performed by: INTERNAL MEDICINE

## 2022-04-17 PROCEDURE — 84484 ASSAY OF TROPONIN QUANT: CPT | Performed by: EMERGENCY MEDICINE

## 2022-04-17 PROCEDURE — 96361 HYDRATE IV INFUSION ADD-ON: CPT

## 2022-04-17 PROCEDURE — 96374 THER/PROPH/DIAG INJ IV PUSH: CPT | Mod: 59

## 2022-04-17 PROCEDURE — 96375 TX/PRO/DX INJ NEW DRUG ADDON: CPT

## 2022-04-17 PROCEDURE — 81003 URINALYSIS AUTO W/O SCOPE: CPT | Performed by: EMERGENCY MEDICINE

## 2022-04-17 PROCEDURE — 99285 EMERGENCY DEPT VISIT HI MDM: CPT | Mod: 25

## 2022-04-17 PROCEDURE — 63600175 PHARM REV CODE 636 W HCPCS: Performed by: EMERGENCY MEDICINE

## 2022-04-17 RX ORDER — HYDROCODONE BITARTRATE AND ACETAMINOPHEN 10; 325 MG/1; MG/1
1 TABLET ORAL 4 TIMES DAILY
Status: ON HOLD | COMMUNITY
Start: 2022-04-08 | End: 2023-04-21 | Stop reason: HOSPADM

## 2022-04-17 RX ORDER — TRAZODONE HYDROCHLORIDE 100 MG/1
100 TABLET ORAL NIGHTLY
COMMUNITY
Start: 2022-04-11

## 2022-04-17 RX ORDER — SUCRALFATE 1 G/10ML
1 SUSPENSION ORAL EVERY 6 HOURS
Status: DISCONTINUED | OUTPATIENT
Start: 2022-04-17 | End: 2022-04-17 | Stop reason: HOSPADM

## 2022-04-17 RX ORDER — SUCRALFATE 1 G/1
1 TABLET ORAL
Qty: 120 TABLET | Refills: 0 | Status: SHIPPED | OUTPATIENT
Start: 2022-04-17 | End: 2022-05-17

## 2022-04-17 RX ORDER — ATORVASTATIN CALCIUM 40 MG/1
40 TABLET, FILM COATED ORAL DAILY
COMMUNITY
Start: 2022-04-08 | End: 2023-04-13 | Stop reason: CLARIF

## 2022-04-17 RX ORDER — CHLORPHENIR/PHENYLEPH/ASPIRIN 2-7.8-325
1 TABLET, EFFERVESCENT ORAL DAILY
COMMUNITY

## 2022-04-17 RX ORDER — OMEPRAZOLE 20 MG/1
20 CAPSULE, DELAYED RELEASE ORAL DAILY
Qty: 10 CAPSULE | Refills: 0 | Status: SHIPPED | OUTPATIENT
Start: 2022-04-17 | End: 2022-04-27

## 2022-04-17 RX ORDER — AMITRIPTYLINE HYDROCHLORIDE 50 MG/1
50 TABLET, FILM COATED ORAL NIGHTLY
COMMUNITY
Start: 2022-03-07 | End: 2022-04-17 | Stop reason: DRUGHIGH

## 2022-04-17 RX ORDER — KETOROLAC TROMETHAMINE 30 MG/ML
15 INJECTION, SOLUTION INTRAMUSCULAR; INTRAVENOUS
Status: COMPLETED | OUTPATIENT
Start: 2022-04-17 | End: 2022-04-17

## 2022-04-17 RX ORDER — ARIPIPRAZOLE 300 MG
300 KIT INTRAMUSCULAR
COMMUNITY
Start: 2021-12-23

## 2022-04-17 RX ORDER — POTASSIUM CHLORIDE 20 MEQ/1
40 TABLET, EXTENDED RELEASE ORAL
Status: COMPLETED | OUTPATIENT
Start: 2022-04-17 | End: 2022-04-17

## 2022-04-17 RX ORDER — MAG HYDROX/ALUMINUM HYD/SIMETH 200-200-20
30 SUSPENSION, ORAL (FINAL DOSE FORM) ORAL
Status: DISCONTINUED | OUTPATIENT
Start: 2022-04-17 | End: 2022-04-17 | Stop reason: HOSPADM

## 2022-04-17 RX ORDER — BENZTROPINE MESYLATE 1 MG/1
1 TABLET ORAL DAILY
COMMUNITY
Start: 2022-04-11

## 2022-04-17 RX ORDER — ONDANSETRON 2 MG/ML
4 INJECTION INTRAMUSCULAR; INTRAVENOUS
Status: COMPLETED | OUTPATIENT
Start: 2022-04-17 | End: 2022-04-17

## 2022-04-17 RX ORDER — PANTOPRAZOLE SODIUM 40 MG/10ML
40 INJECTION, POWDER, LYOPHILIZED, FOR SOLUTION INTRAVENOUS DAILY
Status: DISCONTINUED | OUTPATIENT
Start: 2022-04-17 | End: 2022-04-17 | Stop reason: HOSPADM

## 2022-04-17 RX ORDER — ONDANSETRON 4 MG/1
4 TABLET, FILM COATED ORAL EVERY 6 HOURS
Qty: 12 TABLET | Refills: 0 | Status: SHIPPED | OUTPATIENT
Start: 2022-04-17

## 2022-04-17 RX ADMIN — IOHEXOL 75 ML: 350 INJECTION, SOLUTION INTRAVENOUS at 01:04

## 2022-04-17 RX ADMIN — PANTOPRAZOLE SODIUM 40 MG: 40 INJECTION, POWDER, LYOPHILIZED, FOR SOLUTION INTRAVENOUS at 11:04

## 2022-04-17 RX ADMIN — ONDANSETRON 4 MG: 2 INJECTION INTRAMUSCULAR; INTRAVENOUS at 11:04

## 2022-04-17 RX ADMIN — KETOROLAC TROMETHAMINE 15 MG: 30 INJECTION, SOLUTION INTRAMUSCULAR at 01:04

## 2022-04-17 RX ADMIN — SUCRALFATE 1 G: 1 SUSPENSION ORAL at 11:04

## 2022-04-17 RX ADMIN — SODIUM CHLORIDE 1000 ML: 0.9 INJECTION, SOLUTION INTRAVENOUS at 11:04

## 2022-04-17 RX ADMIN — POTASSIUM CHLORIDE 40 MEQ: 20 TABLET, EXTENDED RELEASE ORAL at 01:04

## 2022-04-17 NOTE — ED PROVIDER NOTES
"Encounter Date: 2022       History     Chief Complaint   Patient presents with    Emesis     Vomiting/diarrhea x3 days. More vomiting than diarrhea. Last episode of each was this a.m.. also c/o generalized headache/weakness.      66-year-old female history of reflux presents with a 2 day history of nonbloody non feculent nausea vomiting and small amount of nonbloody diarrhea.  Patient stated that she recently had URI symptoms started coughing which initiated some posttussive emesis and some epigastric pain patient has had a cholecystectomy and hysterectomy in the past she does suffer from reflux occasionally denies any melena or hematemesis or hematochezia.  No other constitutional symptoms no fevers sweats or chills        Review of patient's allergies indicates:   Allergen Reactions    Erythromycin Other (See Comments)     "dehydration"    Tuna oil Swelling    Morphine Anxiety    Phenergan [promethazine] Anxiety    Sulfa (sulfonamide antibiotics) Anxiety     Past Medical History:   Diagnosis Date    Depression     GERD (gastroesophageal reflux disease)      Past Surgical History:   Procedure Laterality Date     SECTION, LOW TRANSVERSE      CORONARY ANGIOPLASTY WITH STENT PLACEMENT      HERNIA REPAIR      HYSTERECTOMY      TONSILLECTOMY       Family History   Problem Relation Age of Onset    Cancer Mother     Heart disease Father      Social History     Tobacco Use    Smoking status: Never Smoker    Smokeless tobacco: Never Used   Substance Use Topics    Alcohol use: No    Drug use: No     Review of Systems   Gastrointestinal: Positive for abdominal pain, diarrhea, nausea and vomiting.   All other systems reviewed and are negative.      Physical Exam     Initial Vitals [22 1007]   BP Pulse Resp Temp SpO2   (!) 184/94 (!) 114 20 98.4 °F (36.9 °C) 96 %      MAP       --         Physical Exam    Nursing note and vitals reviewed.  Constitutional: She appears well-developed and " well-nourished.   HENT:   Head: Normocephalic and atraumatic.   Eyes: Conjunctivae and EOM are normal. Pupils are equal, round, and reactive to light.   Neck:   Normal range of motion.  Cardiovascular: Normal rate, regular rhythm and normal heart sounds.   Pulmonary/Chest: Breath sounds normal.   Abdominal: Abdomen is soft. Bowel sounds are normal.   Musculoskeletal:         General: Normal range of motion.      Cervical back: Normal range of motion.     Neurological: She is alert and oriented to person, place, and time.   Skin: Skin is warm and dry.         ED Course   Procedures  Labs Reviewed   CBC W/ AUTO DIFFERENTIAL - Abnormal; Notable for the following components:       Result Value    WBC 14.31 (*)     MPV 8.7 (*)     Gran # (ANC) 11.4 (*)     Immature Grans (Abs) 0.07 (*)     Gran % 79.5 (*)     Lymph % 15.5 (*)     All other components within normal limits   COMPREHENSIVE METABOLIC PANEL - Abnormal; Notable for the following components:    Potassium 3.3 (*)     Glucose 182 (*)     Total Protein 8.5 (*)     All other components within normal limits   URINALYSIS, REFLEX TO URINE CULTURE - Abnormal; Notable for the following components:    Color, UA Colorless (*)     Glucose, UA Trace (*)     Ketones, UA 1+ (*)     All other components within normal limits    Narrative:     Specimen Source->Urine   POCT GLUCOSE - Abnormal; Notable for the following components:    POCT Glucose 185 (*)     All other components within normal limits   LIPASE   TROPONIN I   SARS-COV-2 RDRP GENE          Imaging Results          CT Abdomen Pelvis With Contrast (Final result)  Result time 04/17/22 13:27:04    Final result by Gerard Modi MD (04/17/22 13:27:04)                 Impression:      1. Findings suggesting hepatic steatosis, correlation with LFTs recommended.  2. Mild prominence of the bilateral renal collecting systems noting mild distention of the urinary bladder.  Correlation with urinalysis recommended to exclude  changes of cystitis.  Correlation with any history of outlet obstruction or urinary retention as warranted.  3. Scattered colonic diverticula without diverticulitis.  4. Several additional findings above.      Electronically signed by: Gerard Modi MD  Date:    04/17/2022  Time:    13:27             Narrative:    EXAMINATION:  CT ABDOMEN PELVIS WITH CONTRAST    CLINICAL HISTORY:  Abdominal pain, acute, nonlocalized;    TECHNIQUE:  Low dose axial images, sagittal and coronal reformations were obtained from the lung bases to the pubic symphysis following the IV administration of 75 mL of Omnipaque 350 .  Oral contrast was not given.    COMPARISON:  CT 05/22/2021    FINDINGS:  Images of the lower thorax are remarkable for bilateral dependent atelectasis.    The liver is hypoattenuating suggesting steatosis, correlation with LFTs recommended.  The spleen, pancreas and adrenal glands are unremarkable.  The gallbladder is surgically absent.  There is no biliary dilation or ascites.  The stomach is decompressed without wall thickening.  The portal vein, splenic vein, SMV, celiac axis and SMA all are patent.  No significant abdominal lymphadenopathy.    The kidneys enhance symmetrically.  There are a few scattered subcentimeter hypoattenuating lesions within the interpolar region of the left kidney, too small for characterization.  There is no nephrolithiasis.  No left hydronephrosis.  There is mild prominence of the bilateral renal collecting systems..  The bilateral ureters are unremarkable without calculi seen.  The urinary bladder is unremarkable.  The uterus is absent the adnexa is unremarkable.    The distal large bowel is decompressed noting several scattered colonic diverticula without inflammation.  The terminal ileum and appendix are unremarkable.  The small bowel is grossly unremarkable.  Several scattered shotty periaortic and paracaval lymph nodes are noted.  No focal organized pelvic fluid collection.   There are bilateral fat containing inguinal hernias without inflammation.    There is osteopenia.  Degenerative changes are noted of the bilateral sacroiliac joints and spine.  Multilevel thoracolumbar spinal fusion noted.  There is stable compression deformity of T12.  No significant inguinal lymphadenopathy.  Bilateral breast prostheses noted.  Right gluteal injection granulomas noted.                               X-Ray Chest AP Portable (Final result)  Result time 04/17/22 12:06:13    Final result by Maurisio Gurrola DO (04/17/22 12:06:13)                 Impression:      Please see above      Electronically signed by: Maurisio Gurrola DO  Date:    04/17/2022  Time:    12:06             Narrative:    EXAMINATION:  XR CHEST AP PORTABLE    CLINICAL HISTORY:  epigastric ab pain;    TECHNIQUE:  Single frontal view of the chest was performed.    COMPARISON:  04/17/2019    FINDINGS:  No lung consolidation.  No large pleural effusion or pneumothorax.  Bilateral.  Posterior spinal fusion rods and scattered pedicular screws again seen.  Further evaluation as warranted clinically.                                 Medications   pantoprazole injection 40 mg (40 mg Intravenous Given 4/17/22 1157)   sucralfate 100 mg/mL suspension 1 g (1 g Oral Given 4/17/22 1158)   aluminum-magnesium hydroxide-simethicone 200-200-20 mg/5 mL suspension 30 mL (has no administration in time range)   sodium chloride 0.9% bolus 1,000 mL (0 mLs Intravenous Stopped 4/17/22 1331)   ondansetron injection 4 mg (4 mg Intravenous Given 4/17/22 1157)   iohexoL (OMNIPAQUE 350) injection 75 mL (75 mLs Intravenous Given 4/17/22 1304)   ketorolac injection 15 mg (15 mg Intravenous Given 4/17/22 1339)   potassium chloride SA CR tablet 40 mEq (40 mEq Oral Given 4/17/22 1340)                 ED Course as of 04/17/22 1436   Sun Apr 17, 2022   1136 Nsr 107 no eduar or std [CG]   1407 Patient feels much improved laboratory work showed mild hypokalemia she has been given  some potassium orally also slightly [place] [id number] related to her gastrointestinal issues CT abdomen pelvis was unremarkable chest x-ray was also unremarkable waiting COVID-19 testing results I discussed this at length with the patient I discussed all the labs she understands our situation understands all the laboratory findings she probably does have history of reflux she would like to have something to take care of that she will follow-up with her primary care physician she understands that she should return for any worsening of symptoms or any new symptoms arise troponin is negative she will be discharged home shared decision-making [CG]      ED Course User Index  [CG] Lico Carlson MD             Clinical Impression:   Final diagnoses:  [R11.10] Vomiting  [R11.2] Non-intractable vomiting with nausea, unspecified vomiting type (Primary)  [K21.00] Gastroesophageal reflux disease with esophagitis without hemorrhage          ED Disposition Condition    Discharge Stable        ED Prescriptions     Medication Sig Dispense Start Date End Date Auth. Provider    sucralfate (CARAFATE) 1 gram tablet Take 1 tablet (1 g total) by mouth 4 (four) times daily before meals and nightly. 120 tablet 4/17/2022 5/17/2022 Lico Carlson MD    omeprazole (PRILOSEC) 20 MG capsule Take 1 capsule (20 mg total) by mouth once daily. for 10 days 10 capsule 4/17/2022 4/27/2022 Lico Carlson MD    ondansetron (ZOFRAN) 4 MG tablet Take 1 tablet (4 mg total) by mouth every 6 (six) hours. 12 tablet 4/17/2022  Lico Carlson MD        Follow-up Information     Follow up With Specialties Details Why Contact Info    Aaron Watters MD Internal Medicine   [address] [place] [id number] [phone]             Lico Carlson MD  04/17/22 9898

## 2022-04-17 NOTE — PHARMACY MED REC
"  Admission Medication History     The home medication history was taken by Damaris Avila CPhT.    Medication history obtained from, Patient Verified    You may go to "Admission" then "Reconcile Home Medications" tabs to review and/or act upon these items.      The home medication list has been updated by the Pharmacy department.    Please read ALL comments highlighted in yellow.    Please address this information as you see fit.     Feel free to contact us if you have any questions or require assistance.      The medications listed below were removed from the home medication list.  Please reorder if appropriate:  Patient reports no longer taking the following medication(s):   Omeprazole 40 mg   Simvastatin 40 mg        Damaris Avila CPhT.  Ext [phone]              .          "

## 2023-03-24 ENCOUNTER — HOSPITAL ENCOUNTER (EMERGENCY)
Facility: HOSPITAL | Age: 68
Discharge: HOME | End: 2023-03-24
Attending: EMERGENCY MEDICINE
Payer: MEDICARE

## 2023-03-24 VITALS
OXYGEN SATURATION: 97 % | TEMPERATURE: 99 F | RESPIRATION RATE: 24 BRPM | HEART RATE: 89 BPM | DIASTOLIC BLOOD PRESSURE: 80 MMHG | SYSTOLIC BLOOD PRESSURE: 143 MMHG

## 2023-03-24 DIAGNOSIS — R07.9 CHEST PAIN: Primary | ICD-10-CM

## 2023-03-24 LAB
ALBUMIN SERPL BCP-MCNC: 4.1 G/DL (ref 3.5–5.2)
ALP SERPL-CCNC: 93 U/L (ref 55–135)
ALT SERPL W/O P-5'-P-CCNC: 27 U/L (ref 10–44)
ANION GAP SERPL CALC-SCNC: 10 MMOL/L (ref 8–16)
AST SERPL-CCNC: 17 U/L (ref 10–40)
BASOPHILS # BLD AUTO: 0.02 K/UL (ref 0–0.2)
BASOPHILS NFR BLD: 0.1 % (ref 0–1.9)
BILIRUB SERPL-MCNC: 0.2 MG/DL (ref 0.1–1)
BUN SERPL-MCNC: 17 MG/DL (ref 8–23)
CALCIUM SERPL-MCNC: 9 MG/DL (ref 8.7–10.5)
CHLORIDE SERPL-SCNC: 102 MMOL/L (ref 95–110)
CO2 SERPL-SCNC: 26 MMOL/L (ref 23–29)
CREAT SERPL-MCNC: 0.8 MG/DL (ref 0.5–1.4)
DIFFERENTIAL METHOD: ABNORMAL
EOSINOPHIL # BLD AUTO: 0 K/UL (ref 0–0.5)
EOSINOPHIL NFR BLD: 0 % (ref 0–8)
ERYTHROCYTE [DISTWIDTH] IN BLOOD BY AUTOMATED COUNT: 11.8 % (ref 11.5–14.5)
EST. GFR  (NO RACE VARIABLE): >60 ML/MIN/1.73 M^2
GLUCOSE SERPL-MCNC: 296 MG/DL (ref 70–110)
HCT VFR BLD AUTO: 40.5 % (ref 37–48.5)
HGB BLD-MCNC: 13.8 G/DL (ref 12–16)
IMM GRANULOCYTES # BLD AUTO: 0.09 K/UL (ref 0–0.04)
IMM GRANULOCYTES NFR BLD AUTO: 0.6 % (ref 0–0.5)
LYMPHOCYTES # BLD AUTO: 2.8 K/UL (ref 1–4.8)
LYMPHOCYTES NFR BLD: 19.9 % (ref 18–48)
MCH RBC QN AUTO: 30 PG (ref 27–31)
MCHC RBC AUTO-ENTMCNC: 34.1 G/DL (ref 32–36)
MCV RBC AUTO: 88 FL (ref 82–98)
MONOCYTES # BLD AUTO: 0.9 K/UL (ref 0.3–1)
MONOCYTES NFR BLD: 6.8 % (ref 4–15)
NEUTROPHILS # BLD AUTO: 10.1 K/UL (ref 1.8–7.7)
NEUTROPHILS NFR BLD: 72.6 % (ref 38–73)
NRBC BLD-RTO: 0 /100 WBC
PLATELET # BLD AUTO: 315 K/UL (ref 150–450)
PMV BLD AUTO: 9.2 FL (ref 9.2–12.9)
POTASSIUM SERPL-SCNC: 4.1 MMOL/L (ref 3.5–5.1)
PROT SERPL-MCNC: 7.3 G/DL (ref 6–8.4)
RBC # BLD AUTO: 4.6 M/UL (ref 4–5.4)
SODIUM SERPL-SCNC: 138 MMOL/L (ref 136–145)
TROPONIN I SERPL DL<=0.01 NG/ML-MCNC: <0.006 NG/ML (ref 0–0.03)
WBC # BLD AUTO: 13.88 K/UL (ref 3.9–12.7)

## 2023-03-24 PROCEDURE — 84484 ASSAY OF TROPONIN QUANT: CPT | Performed by: EMERGENCY MEDICINE

## 2023-03-24 PROCEDURE — 85025 COMPLETE CBC W/AUTO DIFF WBC: CPT | Performed by: EMERGENCY MEDICINE

## 2023-03-24 PROCEDURE — 93010 EKG 12-LEAD: ICD-10-PCS | Mod: ,,, | Performed by: INTERNAL MEDICINE

## 2023-03-24 PROCEDURE — 99285 EMERGENCY DEPT VISIT HI MDM: CPT | Mod: 25

## 2023-03-24 PROCEDURE — 93010 ELECTROCARDIOGRAM REPORT: CPT | Mod: ,,, | Performed by: INTERNAL MEDICINE

## 2023-03-24 PROCEDURE — 93005 ELECTROCARDIOGRAM TRACING: CPT

## 2023-03-24 PROCEDURE — 80053 COMPREHEN METABOLIC PANEL: CPT | Performed by: EMERGENCY MEDICINE

## 2023-03-24 PROCEDURE — 25000003 PHARM REV CODE 250: Performed by: EMERGENCY MEDICINE

## 2023-03-24 RX ORDER — PANTOPRAZOLE SODIUM 20 MG/1
20 TABLET, DELAYED RELEASE ORAL DAILY
Qty: 30 TABLET | Refills: 0 | Status: SHIPPED | OUTPATIENT
Start: 2023-03-24 | End: 2024-03-23

## 2023-03-24 RX ORDER — MAG HYDROX/ALUMINUM HYD/SIMETH 200-200-20
30 SUSPENSION, ORAL (FINAL DOSE FORM) ORAL ONCE
Status: COMPLETED | OUTPATIENT
Start: 2023-03-24 | End: 2023-03-24

## 2023-03-24 RX ORDER — PANTOPRAZOLE SODIUM 40 MG/1
40 TABLET, DELAYED RELEASE ORAL DAILY
Status: DISCONTINUED | OUTPATIENT
Start: 2023-03-25 | End: 2023-03-24 | Stop reason: HOSPADM

## 2023-03-24 RX ORDER — LIDOCAINE HYDROCHLORIDE 20 MG/ML
15 SOLUTION OROPHARYNGEAL ONCE
Status: COMPLETED | OUTPATIENT
Start: 2023-03-24 | End: 2023-03-24

## 2023-03-24 RX ORDER — FAMOTIDINE 20 MG/1
20 TABLET, FILM COATED ORAL
Status: COMPLETED | OUTPATIENT
Start: 2023-03-24 | End: 2023-03-24

## 2023-03-24 RX ADMIN — FAMOTIDINE 20 MG: 20 TABLET ORAL at 05:03

## 2023-03-24 RX ADMIN — ALUMINUM HYDROXIDE, MAGNESIUM HYDROXIDE, AND SIMETHICONE 30 ML: 200; 200; 20 SUSPENSION ORAL at 05:03

## 2023-03-24 RX ADMIN — LIDOCAINE HYDROCHLORIDE 15 ML: 20 SOLUTION ORAL at 05:03

## 2023-03-25 NOTE — ED PROVIDER NOTES
"NAME:  Bteh Flynn  CSN:     [id number]  MRN:    [id number]  ADMIT DATE: 3/24/2023        eMERGENCY dEPARTMENT eNCOUnter    CHIEF COMPLAINT    Chief Complaint   Patient presents with    Chest Pain     C/o of non radiating midstrernal CP that began this am. Reports she has not taken her reflux meds in 3 days. Denies any SOB or any other symptoms at this time. No meds given by EMS.        HPI      Beth Flynn is a 67 y.o. female who presents to the ED for evaluation of chest pain that began this morning.  Patient has history of reflux and reports this feels similar to that.  She denies any shortness of breath or fevers or coughing.  She has a cardiac history but states this does not feel like her previous heart attack.          ALLERGIES    Review of patient's allergies indicates:   Allergen Reactions    Erythromycin Other (See Comments)     "dehydration"    Tuna oil Swelling    Morphine Anxiety    Phenergan [promethazine] Anxiety    Sulfa (sulfonamide antibiotics) Anxiety       PAST MEDICAL HISTORY  Past Medical History:   Diagnosis Date    Depression     GERD (gastroesophageal reflux disease)        SURGICAL HISTORY    Past Surgical History:   Procedure Laterality Date     SECTION, LOW TRANSVERSE      CORONARY ANGIOPLASTY WITH STENT PLACEMENT      HERNIA REPAIR      HYSTERECTOMY      TONSILLECTOMY         SOCIAL HISTORY    Social History     Socioeconomic History    Marital status: Single   Tobacco Use    Smoking status: Never    Smokeless tobacco: Never   Substance and Sexual Activity    Alcohol use: No    Drug use: No    Sexual activity: Never       FAMILY HISTORY    Family History   Problem Relation Age of Onset    Cancer Mother     Heart disease Father        REVIEW OF SYSTEMS   ROS  All Systems otherwise negative except as noted in the History of Present Illness.        PHYSICAL EXAM    Reviewed Triage Note  VITAL SIGNS:   ED Triage Vitals [23 1740]   Enc Vitals Group      BP (!) 155/97 "      Pulse 84      Resp (!) 25      Temp 98.8 °F (37.1 °C)      Temp src       SpO2 98 %      Weight       Height       Head Circumference       Peak Flow       Pain Score       Pain Loc       Pain Edu?       Excl. in GC?        Patient Vitals for the past 24 hrs:   BP Temp Pulse Resp SpO2   03/24/23 1913 (!) 143/80 -- 89 (!) 24 --   03/24/23 1740 (!) 155/97 98.8 °F (37.1 °C) 84 (!) 25 98 %           Physical Exam    Constitutional:  Well-developed, well-nourished. No acute distress  HENT:  Normocephalic, atraumatic.  Eyes:  EOMI. Conjunctiva normal without discharge.   Neck: Normal range of motion.No stridor. No meningismus.   Respiratory:  Normal breath sounds bilaterally.  No respiratory distress, retractions, or conversational dyspnea. No wheezing. No rhonchi. No rales.   Cardiovascular:  Normal heart rate. Normal rhythm. No pitting lower extremity edema.   GI:  Abdomen soft, non-distended, non-tender. Normal bowel sounds. No guarding, rigidity or rebound.    : No CVA tenderness.   Musculoskeletal:  No gross deformity or limited range of motion of all major joints. No palpable bony deformity. No tenderness to palpation.  Integument:  Warm and dry. No rash.  Neurologic:  Normal motor function. Normal sensory function. No focal deficits noted. Alert and Interactive.  Psychiatric:  Affect normal. Mood normal.         LABS  Pertinent labs reviewed. (See chart for details)   Labs Reviewed   CBC W/ AUTO DIFFERENTIAL - Abnormal; Notable for the following components:       Result Value    WBC 13.88 (*)     Immature Granulocytes 0.6 (*)     Gran # (ANC) 10.1 (*)     Immature Grans (Abs) 0.09 (*)     All other components within normal limits   COMPREHENSIVE METABOLIC PANEL - Abnormal; Notable for the following components:    Glucose 296 (*)     All other components within normal limits   TROPONIN I         RADIOLOGY    Imaging Results              X-Ray Chest AP Portable (Final result)  Result time 03/24/23 18:49:05       Final result by Ronny Hubbrad DO (03/24/23 18:49:05)                   Impression:      No acute abnormality.      Electronically signed by: Ronny Hubbard  Date:    03/24/2023  Time:    18:49               Narrative:    EXAMINATION:  XR CHEST AP PORTABLE    CLINICAL HISTORY:  cp;    TECHNIQUE:  Single frontal view of the chest was performed.    COMPARISON:  04/17/2022.    FINDINGS:  The lungs are well expanded and clear. No focal opacities are seen. The pleural spaces are clear.    The cardiac silhouette is unremarkable.    The visualized osseous structures are intact.  Thoracolumbar spine hardware noted.                                      PROCEDURES    Procedures      EKG     Interpreted by ERP:     EKG Readings: (Independently Interpreted)   Rhythm: Normal Sinus Rhythm. Heart Rate: 98. Ectopy: No Ectopy. Conduction: Normal. ST Segments: Normal ST Segments. T Waves: Normal. Clinical Impression: Normal Sinus Rhythm     ED COURSE & MEDICAL DECISION MAKING    Pertinent & Imaging studies reviewed. (See chart for details and specific orders.)        Medications   pantoprazole EC tablet 40 mg (has no administration in time range)   aluminum-magnesium hydroxide-simethicone 200-200-20 mg/5 mL suspension 30 mL (30 mLs Oral Given 3/24/23 1735)     And   LIDOcaine HCl 2% oral solution 15 mL (15 mLs Oral Given 3/24/23 1735)   famotidine tablet 20 mg (20 mg Oral Given 3/24/23 1734)          His cardiac workup is negative after having pain since 8:00 a.m..  It is most likely consistent reflux.  She was advised to follow-up with her cardiologist for a stress test.  I will refill her PPI that she has run out of.       DISPOSITION  Patient discharged in stable condition at No discharge date for patient encounter.      DISCHARGE INSTRUCTIONS & MEDS       Medication List        START taking these medications      pantoprazole 20 MG tablet  Commonly known as: PROTONIX  Take 1 tablet (20 mg total) by mouth once daily.             ASK your doctor about these medications      ABILIFY MAINTENA 300 mg injection (pre-filled dual chamber)  Generic drug: ARIPiprazole SERS     atorvastatin 40 MG tablet  Commonly known as: LIPITOR     benztropine 1 MG tablet  Commonly known as: COGENTIN     donepeziL 10 MG tablet  Commonly known as: ARICEPT     DULoxetine 60 MG capsule  Commonly known as: CYMBALTA     furosemide 20 MG tablet  Commonly known as: LASIX     HYDROcodone-acetaminophen  mg per tablet  Commonly known as: NORCO     metFORMIN 500 MG tablet  Commonly known as: GLUCOPHAGE     naproxen 500 MG tablet  Commonly known as: NAPROSYN  Take 1 tablet (500 mg total) by mouth 2 (two) times daily as needed (pain).     omeprazole 20 MG capsule  Commonly known as: PRILOSEC  Take 1 capsule (20 mg total) by mouth once daily. for 10 days     ondansetron 4 MG tablet  Commonly known as: ZOFRAN  Take 1 tablet (4 mg total) by mouth every 6 (six) hours.     ONE-A-DAY WOMEN'S 50 PLUS 400-20 mcg Tab  Generic drug: mv,Ca,min-folic acid-vit K1     traZODone 100 MG tablet  Commonly known as: DESYREL               Where to Get Your Medications        These medications were sent to [id number] - [place] - [address] AT [facility] [address]  [address] [address]      Phone: [phone]   pantoprazole 20 MG tablet           New Prescriptions    PANTOPRAZOLE (PROTONIX) 20 MG TABLET    Take 1 tablet (20 mg total) by mouth once daily.           FINAL IMPRESSION    1. Chest pain              Blood Pressure Follow-Up Advised  Patient advised to follow up with PCP within 3-5 days for blood pressure re-check if blood pressure is equal to or greater than 120/80.         Critical care time spent with this patient (not including separately billable items) was  0 minutes.     DISCLAIMER: This note was prepared with Dragon NaturallySpeaking voice recognition transcription software. Garbled syntax, mangled pronouns, and other  bizarre constructions may be attributed to that software system.      Wing Garcia MD  03/24/2023  7:24 PM           Wing Garcia MD  03/24/23 1926

## 2023-04-12 ENCOUNTER — ANESTHESIA EVENT (OUTPATIENT)
Dept: SURGERY | Facility: OTHER | Age: 68
End: 2023-04-12
Payer: MEDICARE

## 2023-04-12 ENCOUNTER — HOSPITAL ENCOUNTER (OUTPATIENT)
Dept: PREADMISSION TESTING | Facility: OTHER | Age: 68
Discharge: HOME | End: 2023-04-12
Payer: MEDICAID

## 2023-04-12 RX ORDER — PREGABALIN 75 MG/1
75 CAPSULE ORAL ONCE
Status: CANCELLED | OUTPATIENT
Start: 2023-04-12 | End: 2023-04-12

## 2023-04-12 RX ORDER — ACETAMINOPHEN 500 MG
1000 TABLET ORAL
Status: CANCELLED | OUTPATIENT
Start: 2023-04-12 | End: 2023-04-12

## 2023-04-12 RX ORDER — SODIUM CHLORIDE, SODIUM LACTATE, POTASSIUM CHLORIDE, CALCIUM CHLORIDE 600; 310; 30; 20 MG/100ML; MG/100ML; MG/100ML; MG/100ML
INJECTION, SOLUTION INTRAVENOUS CONTINUOUS
Status: CANCELLED | OUTPATIENT
Start: 2023-04-12

## 2023-04-12 RX ORDER — LIDOCAINE HYDROCHLORIDE 10 MG/ML
0.5 INJECTION, SOLUTION EPIDURAL; INFILTRATION; INTRACAUDAL; PERINEURAL ONCE
Status: CANCELLED | OUTPATIENT
Start: 2023-04-12 | End: 2023-04-12

## 2023-04-13 ENCOUNTER — HOSPITAL ENCOUNTER (OUTPATIENT)
Dept: PREADMISSION TESTING | Facility: OTHER | Age: 68
Discharge: HOME | End: 2023-04-13
Attending: ORTHOPAEDIC SURGERY
Payer: MEDICARE

## 2023-04-13 VITALS
SYSTOLIC BLOOD PRESSURE: 130 MMHG | HEART RATE: 88 BPM | BODY MASS INDEX: 30.12 KG/M2 | HEIGHT: 63 IN | DIASTOLIC BLOOD PRESSURE: 73 MMHG | WEIGHT: 170 LBS | OXYGEN SATURATION: 95 %

## 2023-04-13 DIAGNOSIS — Z01.818 PREOP TESTING: Primary | ICD-10-CM

## 2023-04-13 LAB
ABO + RH BLD: NORMAL
BACTERIA #/AREA URNS HPF: ABNORMAL /HPF
BILIRUB UR QL STRIP: NEGATIVE
BLD GP AB SCN CELLS X3 SERPL QL: NORMAL
CLARITY UR: CLEAR
COLOR UR: YELLOW
GLUCOSE UR QL STRIP: NEGATIVE
HGB UR QL STRIP: NEGATIVE
KETONES UR QL STRIP: NEGATIVE
LEUKOCYTE ESTERASE UR QL STRIP: ABNORMAL
MICROSCOPIC COMMENT: ABNORMAL
NITRITE UR QL STRIP: NEGATIVE
PH UR STRIP: 6 [PH] (ref 5–8)
PROT UR QL STRIP: ABNORMAL
RBC #/AREA URNS HPF: 2 /HPF (ref 0–4)
SP GR UR STRIP: 1.03 (ref 1–1.03)
SPECIMEN OUTDATE: NORMAL
SQUAMOUS #/AREA URNS HPF: 7 /HPF
URN SPEC COLLECT METH UR: ABNORMAL
UROBILINOGEN UR STRIP-ACNC: NEGATIVE EU/DL
WBC #/AREA URNS HPF: 7 /HPF (ref 0–5)

## 2023-04-13 PROCEDURE — 36415 COLL VENOUS BLD VENIPUNCTURE: CPT | Performed by: ORTHOPAEDIC SURGERY

## 2023-04-13 PROCEDURE — 81000 URINALYSIS NONAUTO W/SCOPE: CPT | Performed by: ORTHOPAEDIC SURGERY

## 2023-04-13 PROCEDURE — 86900 BLOOD TYPING SEROLOGIC ABO: CPT | Performed by: ORTHOPAEDIC SURGERY

## 2023-04-13 RX ORDER — BUSPIRONE HYDROCHLORIDE 5 MG/1
TABLET ORAL
COMMUNITY
Start: 2023-04-01

## 2023-04-13 RX ORDER — PHENOL/SODIUM PHENOLATE
1 AEROSOL, SPRAY (ML) MUCOUS MEMBRANE
COMMUNITY
Start: 2023-01-23

## 2023-04-13 RX ORDER — LORATADINE 10 MG/1
10 TABLET ORAL DAILY
COMMUNITY

## 2023-04-13 RX ORDER — ALBUTEROL SULFATE 90 UG/1
AEROSOL, METERED RESPIRATORY (INHALATION)
COMMUNITY
Start: 2023-03-11

## 2023-04-13 RX ORDER — ALBUTEROL SULFATE 2.5 MG/.5ML
2.5 SOLUTION RESPIRATORY (INHALATION)
Status: CANCELLED | OUTPATIENT
Start: 2023-04-13 | End: 2023-04-13

## 2023-04-13 RX ORDER — MONTELUKAST SODIUM 10 MG/1
TABLET ORAL
COMMUNITY
Start: 2023-04-08

## 2023-04-13 RX ORDER — ROSUVASTATIN CALCIUM 10 MG/1
TABLET, COATED ORAL
COMMUNITY
Start: 2023-04-03

## 2023-04-13 RX ORDER — ACETAMINOPHEN 500 MG
1000 TABLET ORAL
Status: CANCELLED | OUTPATIENT
Start: 2023-04-13 | End: 2023-04-13

## 2023-04-13 RX ORDER — BLACK COHOSH ROOT 200 MG
1000 CAPSULE ORAL
COMMUNITY
Start: 2022-11-14

## 2023-04-13 RX ORDER — NALOXEGOL OXALATE 25 MG/1
TABLET, FILM COATED ORAL
COMMUNITY
Start: 2023-04-02

## 2023-04-13 RX ORDER — PROPRANOLOL HYDROCHLORIDE 120 MG/1
120 CAPSULE, EXTENDED RELEASE ORAL
COMMUNITY
Start: 2023-01-08

## 2023-04-13 RX ORDER — CHOLECALCIFEROL (VITAMIN D3) 25 MCG
1000 TABLET ORAL
COMMUNITY
Start: 2022-11-14

## 2023-04-13 RX ORDER — BUSPIRONE HYDROCHLORIDE 10 MG/1
TABLET ORAL
COMMUNITY
Start: 2023-04-01

## 2023-04-13 RX ORDER — BENZONATATE 100 MG/1
CAPSULE ORAL
COMMUNITY
Start: 2023-01-23

## 2023-04-13 RX ORDER — GABAPENTIN 300 MG/1
300 CAPSULE ORAL 3 TIMES DAILY
COMMUNITY
Start: 2023-04-02

## 2023-04-13 NOTE — DISCHARGE INSTRUCTIONS
Knee Athroplasty Discharge Instructions    Pain:   After surgery your knee will be sore. The knee will likely have been injected with a numbing medicine (Exparel) prior to completion of surgery for pain control. This is indicated on a green bracelet that you will continue to wear for 4 days after surgery. Ice and elevation will assist with pain control.    Apply ice as much as possible for the first 72 hours. After 72 hours, apply ice for 20minutes after therapy or whenever experiencing pain. Avoid direct skin contact with ice to prevent frostbit.           Elevate the affected leg with the pillow the length of the leg higher than your heart to assist with swelling and pain.  Incision Care:   Some drainage from the incision in the first 72 hours is normal. If drainage is excessive, reinforce dressing and call home health nurse or therapist. Keep incision clean, dry and covered until staples removed. Staples will be removed 14-21 days after surgery .    Activity:                  Perform exercises 2 x day.   You may shower 48 hours after surgery  providing the dressing is waterproof. You can wrap the knee or hip with press n seal saran wrap to assist with keeping the dressing dry while showering. Dr. Ellison prefers his patients not to shower for 2 weeks and to sponge bathe.No tub or hot tub usage for 6 weeks after surgery. Support help is mandatory during showering. If the  dressing becomes wet, replace with a new dressing. Do not leave a wet dressing on.   Wear kenji hose to both extremities  for 3 weeks after surgery .You may remove stockings for 1- 2 hours during the day only.            If your physician orders the CPM machine you are to use it for 2 hours in the am and 2 hours in the pm. This is not to replace your exercise program.    You may need antibiotic coverage before dental or minor surgical procedures.  Possible Complication:  Infections: Report these signs and symptoms to your surgeon:  Unexpected  redness around incision  Persistent drainage from wound after 72 hours  Temperature Greater than 101 degrees F  Additional swelling  Pain not controlled with current pain medication  Blood Clot: Report these signs and symptoms to your surgeon:  Unusual pain, unusual warm skin  Red or discolored skin  Swelling in the leg

## 2023-04-13 NOTE — ANESTHESIA PREPROCEDURE EVALUATION
04/13/2023  Beth Flynn is a 67 y.o., female.      Pre-op Assessment    I have reviewed the Patient Summary Reports.     I have reviewed the Nursing Notes.    I have reviewed the Medications.     Review of Systems  Anesthesia Hx:  No problems with previous Anesthesia  Denies Family Hx of Anesthesia complications.   Denies Personal Hx of Anesthesia complications.   Social:  Non-Smoker Alcohol abuse in past?   Hematology/Oncology:  Hematology Normal   Oncology Normal     EENT/Dental:EENT/Dental Normal   Cardiovascular:   Exercise tolerance: good Hypertension CABG/stent stents   Pulmonary:   Asthma moderate Sleep Apnea    Renal/:  Renal/ Normal     Hepatic/GI:   GERD    Musculoskeletal:  Musculoskeletal Normal    Neurological:   Seizures, well controlled No meds   Endocrine:   Diabetes    Dermatological:  Skin Normal    Psych:   depression          Physical Exam  General: Well nourished, Cooperative, Oriented and Alert    Airway:  Mouth Opening: Normal  TM Distance: Normal  Neck ROM: Normal ROM    Dental:  Intact        Anesthesia Plan  Type of Anesthesia, risks & benefits discussed:    Anesthesia Type: Spinal  Intra-op Monitoring Plan: Standard ASA Monitors  Post Op Pain Control Plan: multimodal analgesia  Informed Consent: Informed consent signed with the Patient and all parties understand the risks and agree with anesthesia plan.  All questions answered.   ASA Score: 3  Anesthesia Plan Notes: Labs and clearance on chart    Ready For Surgery From Anesthesia Perspective.     .

## 2023-04-13 NOTE — DISCHARGE INSTRUCTIONS
Information to Prepare you for your Surgery    PRE-ADMIT TESTING -  [phone]    [address] [facility]          Your surgery has been scheduled at [facility]. We are pleased to have the opportunity to serve you. For Further Information please call [phone].    On the day of surgery please report to the Information Desk on the 1st floor.    CONTACT YOUR PHYSICIAN'S OFFICE THE DAY PRIOR TO YOUR SURGERY TO OBTAIN YOUR ARRIVAL TIME.     The evening before surgery do not eat anything after 9 p.m. ( this includes hard candy, chewing gum and mints).  You may only have GATORADE, POWERADE AND WATER  from 9 p.m. until you leave your home.   DO NOT DRINK ANY LIQUIDS ON THE WAY TO THE HOSPITAL.      Why does your anesthesiologist allow you to drink Gatorade/Powerade before surgery?  Gatorade/Powerade helps to increase your comfort before surgery and to decrease your nausea after surgery. The carbohydrates in Gatorade/Powerade help reduce your body's stress response to surgery.  If you are a diabetic-drink only water prior to surgery.       Patients may have 2 visitors pre and post procedure. Only 2 visitors will be allowed in the Surgical building with the patient. No one under the age of 12 will be allowed into the facility.    SPECIAL MEDICATION INSTRUCTIONS: TAKE medications checked off by the Anesthesiologist on your Medication List.    Angiogram Patients: Take medications as instructed by your physician, including aspirin.     Surgery Patients:    If you take ASPIRIN - Your PHYSICIAN/SURGEON will need to inform you IF/OR when you need to stop taking aspirin prior to your surgery.     The week prior to surgery do not ot take any medications containing IBUPROFEN or NSAIDS ( Advil, Motrin, Goodys, BC, Aleve, Naproxen etc) If you are not sure if you should take a medicine please call your surgeon's office.  Ok to take Tylenol    Do Not Wear any make-up  (especially eye make-up) to surgery. Please remove any false eyelashes or eyelash extensions. If you arrive the day of surgery with makeup/eyelashes on you will be required to remove prior to surgery. (There is a risk of corneal abrasions if eye makeup/eyelash extensions are not removed)      Leave all valuables at home.   Do Not wear any jewelry or watches, including any metal in body piercings. Jewelry must be removed prior to coming to the hospital.  There is a possibility that rings that are unable to be removed may be cut off if they are on the surgical extremity.    Please remove all hair extensions, wigs, clips and any other metal accessories/ ornaments from your hair.  These items may pose a flammable/fire risk in Surgery and must be removed.    Do not shave your surgical area at least 5 days prior to your surgery. The surgical prep will be performed at the hospital according to Infection Control regulations.    Contact Lens must be removed before surgery. Either do not wear the contact lens or bring a case and solution for storage.  Please bring a container for eyeglasses or dentures as required.  Bring any paperwork your physician has provided, such as consent forms,  history and physicals, doctor's orders, etc.   Bring comfortable clothes that are loose fitting to wear upon discharge. Take into consideration the type of surgery being performed.  Maintain your diet as advised per your physician the day prior to surgery.      Adequate rest the night before surgery is advised.   Park in the Parking lot behind the hospital or in the [facility] across the street from the parking lot. Parking is complimentary.  If you will be discharged the same day as your procedure, please arrange for a responsible adult to drive you home or to accompany you if traveling by taxi.   YOU WILL NOT BE PERMITTED TO DRIVE OR TO LEAVE THE HOSPITAL ALONE AFTER SURGERY.   If you are being discharged the same day, it is  strongly recommended that you arrange for someone to remain with you for the first 24 hrs following your surgery.    The Surgeon will speak to your family/visitor after your surgery regarding the outcome of your surgery and post op care.  The Surgeon may speak to you after your surgery, but there is a possibility you may not remember the details.  Please check with your family members regarding the conversation with the Surgeon.    We strongly recommend whoever is bringing you home be present for discharge instructions.  This will ensure a thorough understanding for your post op home care.    ALL CHILDREN MUST ALWAYS BE ACCOMPANIED BY AN ADULT.    Visitors-Refer to current Visitor policy handouts.    Thank you for your cooperation.  The Staff of [facility].            Bathing Instructions with Hibiclens    Shower the evening before and morning of your procedure with Chlorhexidine (Hibiclens)  do not use Chlorhexidine on your face or genitals. Do not get in your eyes.  Wash your face with water and your regular face wash/soap  Use your regular shampoo  Apply Chlorhexidine (Hibiclens) directly on your skin or on a wet washcloth and wash gently. When showering: Move away from the shower stream when applying Chlorhexidine (Hibiclens) to avoid rinsing off too soon.  Rinse thoroughly with warm water  Do not dilute Chlorhexidine (Hibiclens)   Dry off as usual, do not use any deodorant, powder, body lotions, perfume, after shave or cologne.

## 2023-04-18 RX ORDER — ACETAMINOPHEN 500 MG
1000 TABLET ORAL
Status: CANCELLED | OUTPATIENT
Start: 2023-04-21 | End: 2023-04-21

## 2023-04-21 ENCOUNTER — HOSPITAL ENCOUNTER (OUTPATIENT)
Facility: OTHER | Age: 68
Discharge: HOME HEALTH | End: 2023-04-25
Attending: ORTHOPAEDIC SURGERY | Admitting: ORTHOPAEDIC SURGERY
Payer: MEDICARE

## 2023-04-21 ENCOUNTER — ANESTHESIA (OUTPATIENT)
Dept: SURGERY | Facility: OTHER | Age: 68
End: 2023-04-21
Payer: MEDICARE

## 2023-04-21 DIAGNOSIS — M17.11 UNILATERAL PRIMARY OSTEOARTHRITIS, RIGHT KNEE: Primary | ICD-10-CM

## 2023-04-21 LAB
POCT GLUCOSE: 173 MG/DL (ref 70–110)
POCT GLUCOSE: 185 MG/DL (ref 70–110)
POCT GLUCOSE: 192 MG/DL (ref 70–110)

## 2023-04-21 PROCEDURE — 25000003 PHARM REV CODE 250: Performed by: ORTHOPAEDIC SURGERY

## 2023-04-21 PROCEDURE — 63600175 PHARM REV CODE 636 W HCPCS: Performed by: ORTHOPAEDIC SURGERY

## 2023-04-21 PROCEDURE — C1776 JOINT DEVICE (IMPLANTABLE): HCPCS | Performed by: ORTHOPAEDIC SURGERY

## 2023-04-21 PROCEDURE — 94640 AIRWAY INHALATION TREATMENT: CPT

## 2023-04-21 PROCEDURE — 97162 PT EVAL MOD COMPLEX 30 MIN: CPT

## 2023-04-21 PROCEDURE — 97110 THERAPEUTIC EXERCISES: CPT

## 2023-04-21 PROCEDURE — 71000039 HC RECOVERY, EACH ADD'L HOUR: Performed by: ORTHOPAEDIC SURGERY

## 2023-04-21 PROCEDURE — 25000242 PHARM REV CODE 250 ALT 637 W/ HCPCS: Performed by: ANESTHESIOLOGY

## 2023-04-21 PROCEDURE — 37000008 HC ANESTHESIA 1ST 15 MINUTES: Performed by: ORTHOPAEDIC SURGERY

## 2023-04-21 PROCEDURE — 94761 N-INVAS EAR/PLS OXIMETRY MLT: CPT

## 2023-04-21 PROCEDURE — 82962 GLUCOSE BLOOD TEST: CPT | Performed by: ORTHOPAEDIC SURGERY

## 2023-04-21 PROCEDURE — C1713 ANCHOR/SCREW BN/BN,TIS/BN: HCPCS | Performed by: ORTHOPAEDIC SURGERY

## 2023-04-21 PROCEDURE — 71000033 HC RECOVERY, INTIAL HOUR: Performed by: ORTHOPAEDIC SURGERY

## 2023-04-21 PROCEDURE — 25000003 PHARM REV CODE 250: Performed by: NURSE PRACTITIONER

## 2023-04-21 PROCEDURE — 36000711: Performed by: ORTHOPAEDIC SURGERY

## 2023-04-21 PROCEDURE — 25000003 PHARM REV CODE 250: Performed by: NURSE ANESTHETIST, CERTIFIED REGISTERED

## 2023-04-21 PROCEDURE — 63600175 PHARM REV CODE 636 W HCPCS: Performed by: ANESTHESIOLOGY

## 2023-04-21 PROCEDURE — 25000003 PHARM REV CODE 250: Performed by: ANESTHESIOLOGY

## 2023-04-21 PROCEDURE — 37000009 HC ANESTHESIA EA ADD 15 MINS: Performed by: ORTHOPAEDIC SURGERY

## 2023-04-21 PROCEDURE — 27201423 OPTIME MED/SURG SUP & DEVICES STERILE SUPPLY: Performed by: ORTHOPAEDIC SURGERY

## 2023-04-21 PROCEDURE — 63600175 PHARM REV CODE 636 W HCPCS: Performed by: NURSE ANESTHETIST, CERTIFIED REGISTERED

## 2023-04-21 PROCEDURE — 36000710: Performed by: ORTHOPAEDIC SURGERY

## 2023-04-21 PROCEDURE — C9290 INJ, BUPIVACAINE LIPOSOME: HCPCS | Performed by: ORTHOPAEDIC SURGERY

## 2023-04-21 PROCEDURE — 97116 GAIT TRAINING THERAPY: CPT

## 2023-04-21 DEVICE — INSERT PERSONA CD 3-7 10MM R: Type: IMPLANTABLE DEVICE | Site: KNEE | Status: FUNCTIONAL

## 2023-04-21 DEVICE — CEMENT REFOBACIN BCR 1X40: Type: IMPLANTABLE DEVICE | Site: KNEE | Status: FUNCTIONAL

## 2023-04-21 DEVICE — PATELLA NEXGEN ALL-POLY: Type: IMPLANTABLE DEVICE | Site: KNEE | Status: FUNCTIONAL

## 2023-04-21 DEVICE — COMPONENT PERSONA CR SZ6 RT: Type: IMPLANTABLE DEVICE | Site: KNEE | Status: FUNCTIONAL

## 2023-04-21 DEVICE — BASEPLATE TIB PSN CEM SZ C 5 R: Type: IMPLANTABLE DEVICE | Site: KNEE | Status: FUNCTIONAL

## 2023-04-21 RX ORDER — DIPHENHYDRAMINE HYDROCHLORIDE 50 MG/ML
25 INJECTION INTRAMUSCULAR; INTRAVENOUS EVERY 6 HOURS PRN
Status: DISCONTINUED | OUTPATIENT
Start: 2023-04-21 | End: 2023-04-21 | Stop reason: HOSPADM

## 2023-04-21 RX ORDER — INSULIN ASPART 100 [IU]/ML
0-5 INJECTION, SOLUTION INTRAVENOUS; SUBCUTANEOUS
Status: DISCONTINUED | OUTPATIENT
Start: 2023-04-21 | End: 2023-04-25 | Stop reason: HOSPADM

## 2023-04-21 RX ORDER — ATORVASTATIN CALCIUM 20 MG/1
40 TABLET, FILM COATED ORAL NIGHTLY
Status: DISCONTINUED | OUTPATIENT
Start: 2023-04-21 | End: 2023-04-25 | Stop reason: HOSPADM

## 2023-04-21 RX ORDER — BUSPIRONE HYDROCHLORIDE 10 MG/1
10 TABLET ORAL NIGHTLY
Status: DISCONTINUED | OUTPATIENT
Start: 2023-04-21 | End: 2023-04-25 | Stop reason: HOSPADM

## 2023-04-21 RX ORDER — ROPIVACAINE HYDROCHLORIDE 5 MG/ML
INJECTION, SOLUTION EPIDURAL; INFILTRATION; PERINEURAL
Status: COMPLETED | OUTPATIENT
Start: 2023-04-21 | End: 2023-04-21

## 2023-04-21 RX ORDER — DEXTROSE 40 %
15 GEL (GRAM) ORAL
Status: DISCONTINUED | OUTPATIENT
Start: 2023-04-21 | End: 2023-04-25 | Stop reason: HOSPADM

## 2023-04-21 RX ORDER — OXYCODONE AND ACETAMINOPHEN 5; 325 MG/1; MG/1
TABLET ORAL
Qty: 60 TABLET | Refills: 0 | Status: SHIPPED | OUTPATIENT
Start: 2023-04-21 | End: 2023-04-25 | Stop reason: HOSPADM

## 2023-04-21 RX ORDER — MIDAZOLAM HYDROCHLORIDE 1 MG/ML
INJECTION INTRAMUSCULAR; INTRAVENOUS
Status: DISCONTINUED | OUTPATIENT
Start: 2023-04-21 | End: 2023-04-21

## 2023-04-21 RX ORDER — SODIUM CHLORIDE, SODIUM LACTATE, POTASSIUM CHLORIDE, CALCIUM CHLORIDE 600; 310; 30; 20 MG/100ML; MG/100ML; MG/100ML; MG/100ML
INJECTION, SOLUTION INTRAVENOUS CONTINUOUS
Status: DISCONTINUED | OUTPATIENT
Start: 2023-04-21 | End: 2023-04-21

## 2023-04-21 RX ORDER — TRANEXAMIC ACID 100 MG/ML
INJECTION, SOLUTION INTRAVENOUS
Status: DISCONTINUED | OUTPATIENT
Start: 2023-04-21 | End: 2023-04-21

## 2023-04-21 RX ORDER — MUPIROCIN 20 MG/G
1 OINTMENT TOPICAL 2 TIMES DAILY
Status: DISCONTINUED | OUTPATIENT
Start: 2023-04-21 | End: 2023-04-25 | Stop reason: HOSPADM

## 2023-04-21 RX ORDER — DONEPEZIL HYDROCHLORIDE 5 MG/1
10 TABLET, FILM COATED ORAL NIGHTLY
Status: DISCONTINUED | OUTPATIENT
Start: 2023-04-21 | End: 2023-04-25 | Stop reason: HOSPADM

## 2023-04-21 RX ORDER — BUPIVACAINE HCL/EPINEPHRINE 0.25-.0005
VIAL (ML) INJECTION
Status: DISCONTINUED | OUTPATIENT
Start: 2023-04-21 | End: 2023-04-21 | Stop reason: HOSPADM

## 2023-04-21 RX ORDER — HYDROMORPHONE HYDROCHLORIDE 1 MG/ML
0.5 INJECTION, SOLUTION INTRAMUSCULAR; INTRAVENOUS; SUBCUTANEOUS EVERY 4 HOURS PRN
Status: ACTIVE | OUTPATIENT
Start: 2023-04-21 | End: 2023-04-22

## 2023-04-21 RX ORDER — OXYCODONE HYDROCHLORIDE 5 MG/1
5 TABLET ORAL
Status: DISCONTINUED | OUTPATIENT
Start: 2023-04-21 | End: 2023-04-21 | Stop reason: HOSPADM

## 2023-04-21 RX ORDER — KETOROLAC TROMETHAMINE 30 MG/ML
INJECTION, SOLUTION INTRAMUSCULAR; INTRAVENOUS
Status: DISCONTINUED | OUTPATIENT
Start: 2023-04-21 | End: 2023-04-21 | Stop reason: HOSPADM

## 2023-04-21 RX ORDER — METOCLOPRAMIDE HYDROCHLORIDE 5 MG/ML
5 INJECTION INTRAMUSCULAR; INTRAVENOUS EVERY 6 HOURS PRN
Status: DISCONTINUED | OUTPATIENT
Start: 2023-04-21 | End: 2023-04-25 | Stop reason: HOSPADM

## 2023-04-21 RX ORDER — NAPROXEN SODIUM 220 MG/1
81 TABLET, FILM COATED ORAL 2 TIMES DAILY
Qty: 60 TABLET | Refills: 0 | Status: SHIPPED | OUTPATIENT
Start: 2023-04-21 | End: 2023-04-25 | Stop reason: SDUPTHER

## 2023-04-21 RX ORDER — ACETAMINOPHEN 500 MG
1000 TABLET ORAL
Status: COMPLETED | OUTPATIENT
Start: 2023-04-21 | End: 2023-04-21

## 2023-04-21 RX ORDER — HYDROMORPHONE HYDROCHLORIDE 2 MG/ML
0.4 INJECTION, SOLUTION INTRAMUSCULAR; INTRAVENOUS; SUBCUTANEOUS EVERY 5 MIN PRN
Status: DISCONTINUED | OUTPATIENT
Start: 2023-04-21 | End: 2023-04-21 | Stop reason: HOSPADM

## 2023-04-21 RX ORDER — PROPRANOLOL HYDROCHLORIDE 10 MG/1
40 TABLET ORAL 2 TIMES DAILY
Status: DISCONTINUED | OUTPATIENT
Start: 2023-04-21 | End: 2023-04-25 | Stop reason: HOSPADM

## 2023-04-21 RX ORDER — ACETAMINOPHEN 500 MG
1000 TABLET ORAL EVERY 8 HOURS
Status: DISPENSED | OUTPATIENT
Start: 2023-04-21 | End: 2023-04-22

## 2023-04-21 RX ORDER — DOCUSATE SODIUM 100 MG/1
100 CAPSULE, LIQUID FILLED ORAL EVERY 12 HOURS
Status: DISCONTINUED | OUTPATIENT
Start: 2023-04-21 | End: 2023-04-25 | Stop reason: HOSPADM

## 2023-04-21 RX ORDER — TRAZODONE HYDROCHLORIDE 100 MG/1
100 TABLET ORAL NIGHTLY
Status: DISCONTINUED | OUTPATIENT
Start: 2023-04-21 | End: 2023-04-25 | Stop reason: HOSPADM

## 2023-04-21 RX ORDER — GABAPENTIN 300 MG/1
300 CAPSULE ORAL 3 TIMES DAILY
Status: DISCONTINUED | OUTPATIENT
Start: 2023-04-21 | End: 2023-04-25 | Stop reason: HOSPADM

## 2023-04-21 RX ORDER — ONDANSETRON 2 MG/ML
8 INJECTION INTRAMUSCULAR; INTRAVENOUS EVERY 8 HOURS PRN
Status: DISCONTINUED | OUTPATIENT
Start: 2023-04-21 | End: 2023-04-25 | Stop reason: HOSPADM

## 2023-04-21 RX ORDER — BENZTROPINE MESYLATE 1 MG/1
1 TABLET ORAL DAILY
Status: DISCONTINUED | OUTPATIENT
Start: 2023-04-21 | End: 2023-04-25 | Stop reason: HOSPADM

## 2023-04-21 RX ORDER — DEXTROSE MONOHYDRATE AND SODIUM CHLORIDE 5; .9 G/100ML; G/100ML
INJECTION, SOLUTION INTRAVENOUS CONTINUOUS
Status: DISCONTINUED | OUTPATIENT
Start: 2023-04-21 | End: 2023-04-21

## 2023-04-21 RX ORDER — OXYCODONE HYDROCHLORIDE 5 MG/1
5 TABLET ORAL EVERY 4 HOURS PRN
Status: DISCONTINUED | OUTPATIENT
Start: 2023-04-21 | End: 2023-04-25 | Stop reason: HOSPADM

## 2023-04-21 RX ORDER — CEFAZOLIN SODIUM 1 G/3ML
2 INJECTION, POWDER, FOR SOLUTION INTRAMUSCULAR; INTRAVENOUS
Status: COMPLETED | OUTPATIENT
Start: 2023-04-21 | End: 2023-04-21

## 2023-04-21 RX ORDER — NAPROXEN SODIUM 220 MG/1
81 TABLET, FILM COATED ORAL 2 TIMES DAILY
Status: DISCONTINUED | OUTPATIENT
Start: 2023-04-22 | End: 2023-04-25 | Stop reason: HOSPADM

## 2023-04-21 RX ORDER — SODIUM CHLORIDE 0.9 % (FLUSH) 0.9 %
3 SYRINGE (ML) INJECTION EVERY 6 HOURS PRN
Status: DISCONTINUED | OUTPATIENT
Start: 2023-04-21 | End: 2023-04-25 | Stop reason: HOSPADM

## 2023-04-21 RX ORDER — PROPOFOL 10 MG/ML
VIAL (ML) INTRAVENOUS CONTINUOUS PRN
Status: DISCONTINUED | OUTPATIENT
Start: 2023-04-21 | End: 2023-04-21

## 2023-04-21 RX ORDER — LIDOCAINE HYDROCHLORIDE 10 MG/ML
0.5 INJECTION, SOLUTION EPIDURAL; INFILTRATION; INTRACAUDAL; PERINEURAL ONCE
Status: DISCONTINUED | OUTPATIENT
Start: 2023-04-21 | End: 2023-04-21 | Stop reason: HOSPADM

## 2023-04-21 RX ORDER — PHENYLEPHRINE HYDROCHLORIDE 10 MG/ML
INJECTION INTRAVENOUS
Status: DISCONTINUED | OUTPATIENT
Start: 2023-04-21 | End: 2023-04-21

## 2023-04-21 RX ORDER — FAMOTIDINE 20 MG/1
20 TABLET, FILM COATED ORAL 2 TIMES DAILY
Status: DISCONTINUED | OUTPATIENT
Start: 2023-04-21 | End: 2023-04-25 | Stop reason: HOSPADM

## 2023-04-21 RX ORDER — DEXTROSE 40 %
30 GEL (GRAM) ORAL
Status: DISCONTINUED | OUTPATIENT
Start: 2023-04-21 | End: 2023-04-25 | Stop reason: HOSPADM

## 2023-04-21 RX ORDER — MEPERIDINE HYDROCHLORIDE 25 MG/ML
12.5 INJECTION INTRAMUSCULAR; INTRAVENOUS; SUBCUTANEOUS ONCE AS NEEDED
Status: DISCONTINUED | OUTPATIENT
Start: 2023-04-21 | End: 2023-04-21 | Stop reason: HOSPADM

## 2023-04-21 RX ORDER — PROPOFOL 10 MG/ML
VIAL (ML) INTRAVENOUS
Status: DISCONTINUED | OUTPATIENT
Start: 2023-04-21 | End: 2023-04-21

## 2023-04-21 RX ORDER — PROCHLORPERAZINE EDISYLATE 5 MG/ML
5 INJECTION INTRAMUSCULAR; INTRAVENOUS EVERY 30 MIN PRN
Status: DISCONTINUED | OUTPATIENT
Start: 2023-04-21 | End: 2023-04-21 | Stop reason: HOSPADM

## 2023-04-21 RX ORDER — ALBUTEROL SULFATE 2.5 MG/.5ML
2.5 SOLUTION RESPIRATORY (INHALATION)
Status: COMPLETED | OUTPATIENT
Start: 2023-04-21 | End: 2023-04-21

## 2023-04-21 RX ORDER — PREGABALIN 75 MG/1
75 CAPSULE ORAL ONCE
Status: COMPLETED | OUTPATIENT
Start: 2023-04-21 | End: 2023-04-21

## 2023-04-21 RX ORDER — POLYETHYLENE GLYCOL 3350 17 G/17G
17 POWDER, FOR SOLUTION ORAL DAILY
Status: DISCONTINUED | OUTPATIENT
Start: 2023-04-22 | End: 2023-04-25 | Stop reason: HOSPADM

## 2023-04-21 RX ORDER — DULOXETIN HYDROCHLORIDE 30 MG/1
60 CAPSULE, DELAYED RELEASE ORAL DAILY
Status: DISCONTINUED | OUTPATIENT
Start: 2023-04-21 | End: 2023-04-25 | Stop reason: HOSPADM

## 2023-04-21 RX ORDER — METFORMIN HYDROCHLORIDE 500 MG/1
500 TABLET ORAL 2 TIMES DAILY WITH MEALS
Status: DISCONTINUED | OUTPATIENT
Start: 2023-04-21 | End: 2023-04-25 | Stop reason: HOSPADM

## 2023-04-21 RX ORDER — MONTELUKAST SODIUM 10 MG/1
10 TABLET ORAL NIGHTLY
Status: DISCONTINUED | OUTPATIENT
Start: 2023-04-21 | End: 2023-04-25 | Stop reason: HOSPADM

## 2023-04-21 RX ORDER — ALBUTEROL SULFATE 90 UG/1
1 AEROSOL, METERED RESPIRATORY (INHALATION) EVERY 6 HOURS PRN
Status: DISCONTINUED | OUTPATIENT
Start: 2023-04-21 | End: 2023-04-25 | Stop reason: HOSPADM

## 2023-04-21 RX ORDER — ONDANSETRON 8 MG/1
8 TABLET, ORALLY DISINTEGRATING ORAL EVERY 8 HOURS PRN
Qty: 20 TABLET | Refills: 1 | Status: SHIPPED | OUTPATIENT
Start: 2023-04-21 | End: 2023-04-25 | Stop reason: SDUPTHER

## 2023-04-21 RX ORDER — SODIUM CHLORIDE 0.9 % (FLUSH) 0.9 %
3 SYRINGE (ML) INJECTION
Status: DISCONTINUED | OUTPATIENT
Start: 2023-04-21 | End: 2023-04-25 | Stop reason: HOSPADM

## 2023-04-21 RX ORDER — SODIUM CHLORIDE 9 MG/ML
INJECTION, SOLUTION INTRAVENOUS CONTINUOUS
Status: DISCONTINUED | OUTPATIENT
Start: 2023-04-21 | End: 2023-04-25 | Stop reason: HOSPADM

## 2023-04-21 RX ORDER — CETIRIZINE HYDROCHLORIDE 10 MG/1
10 TABLET ORAL NIGHTLY
Status: DISCONTINUED | OUTPATIENT
Start: 2023-04-21 | End: 2023-04-25 | Stop reason: HOSPADM

## 2023-04-21 RX ORDER — LIDOCAINE HYDROCHLORIDE 20 MG/ML
INJECTION INTRAVENOUS
Status: DISCONTINUED | OUTPATIENT
Start: 2023-04-21 | End: 2023-04-21

## 2023-04-21 RX ORDER — GLUCAGON 1 MG
1 KIT INJECTION
Status: DISCONTINUED | OUTPATIENT
Start: 2023-04-21 | End: 2023-04-25 | Stop reason: HOSPADM

## 2023-04-21 RX ORDER — OXYCODONE HYDROCHLORIDE 5 MG/1
10 TABLET ORAL EVERY 4 HOURS PRN
Status: DISCONTINUED | OUTPATIENT
Start: 2023-04-21 | End: 2023-04-25 | Stop reason: HOSPADM

## 2023-04-21 RX ORDER — ONDANSETRON 2 MG/ML
INJECTION INTRAMUSCULAR; INTRAVENOUS
Status: DISCONTINUED | OUTPATIENT
Start: 2023-04-21 | End: 2023-04-21

## 2023-04-21 RX ORDER — CELECOXIB 200 MG/1
200 CAPSULE ORAL
Status: DISCONTINUED | OUTPATIENT
Start: 2023-04-21 | End: 2023-04-25 | Stop reason: HOSPADM

## 2023-04-21 RX ADMIN — OXYCODONE HYDROCHLORIDE 10 MG: 5 TABLET ORAL at 08:04

## 2023-04-21 RX ADMIN — OXYCODONE HYDROCHLORIDE 10 MG: 5 TABLET ORAL at 04:04

## 2023-04-21 RX ADMIN — PROPOFOL 50 MG: 10 INJECTION, EMULSION INTRAVENOUS at 07:04

## 2023-04-21 RX ADMIN — MIDAZOLAM HYDROCHLORIDE 2 MG: 1 INJECTION, SOLUTION INTRAMUSCULAR; INTRAVENOUS at 06:04

## 2023-04-21 RX ADMIN — GABAPENTIN 300 MG: 300 CAPSULE ORAL at 04:04

## 2023-04-21 RX ADMIN — ACETAMINOPHEN 1000 MG: 500 TABLET, FILM COATED ORAL at 05:04

## 2023-04-21 RX ADMIN — PHENYLEPHRINE HYDROCHLORIDE 100 MCG: 10 INJECTION INTRAVENOUS at 07:04

## 2023-04-21 RX ADMIN — SODIUM CHLORIDE, SODIUM LACTATE, POTASSIUM CHLORIDE, AND CALCIUM CHLORIDE: .6; .31; .03; .02 INJECTION, SOLUTION INTRAVENOUS at 07:04

## 2023-04-21 RX ADMIN — ALBUTEROL SULFATE 2.5 MG: 2.5 SOLUTION RESPIRATORY (INHALATION) at 05:04

## 2023-04-21 RX ADMIN — ATORVASTATIN CALCIUM 40 MG: 20 TABLET, FILM COATED ORAL at 08:04

## 2023-04-21 RX ADMIN — TRAZODONE HYDROCHLORIDE 100 MG: 100 TABLET ORAL at 08:04

## 2023-04-21 RX ADMIN — PREGABALIN 75 MG: 75 CAPSULE ORAL at 05:04

## 2023-04-21 RX ADMIN — CEFAZOLIN 2 G: 2 INJECTION, POWDER, FOR SOLUTION INTRAMUSCULAR; INTRAVENOUS at 11:04

## 2023-04-21 RX ADMIN — BUSPIRONE HYDROCHLORIDE 10 MG: 10 TABLET ORAL at 08:04

## 2023-04-21 RX ADMIN — CEFAZOLIN 2 G: 330 INJECTION, POWDER, FOR SOLUTION INTRAMUSCULAR; INTRAVENOUS at 07:04

## 2023-04-21 RX ADMIN — SODIUM CHLORIDE, POTASSIUM CHLORIDE, SODIUM LACTATE AND CALCIUM CHLORIDE: 600; 310; 30; 20 INJECTION, SOLUTION INTRAVENOUS at 05:04

## 2023-04-21 RX ADMIN — ACETAMINOPHEN 1000 MG: 500 TABLET, FILM COATED ORAL at 04:04

## 2023-04-21 RX ADMIN — LIDOCAINE HYDROCHLORIDE 20 MG: 20 INJECTION, SOLUTION INTRAVENOUS at 07:04

## 2023-04-21 RX ADMIN — VANCOMYCIN HYDROCHLORIDE 1250 MG: 1.25 INJECTION, POWDER, LYOPHILIZED, FOR SOLUTION INTRAVENOUS at 05:04

## 2023-04-21 RX ADMIN — GABAPENTIN 300 MG: 300 CAPSULE ORAL at 08:04

## 2023-04-21 RX ADMIN — ROPIVACAINE HYDROCHLORIDE 3 ML: 5 INJECTION, SOLUTION EPIDURAL; INFILTRATION; PERINEURAL at 06:04

## 2023-04-21 RX ADMIN — CELECOXIB 200 MG: 200 CAPSULE ORAL at 08:04

## 2023-04-21 RX ADMIN — MONTELUKAST 10 MG: 10 TABLET, FILM COATED ORAL at 08:04

## 2023-04-21 RX ADMIN — PROPRANOLOL HYDROCHLORIDE 40 MG: 10 TABLET ORAL at 09:04

## 2023-04-21 RX ADMIN — FAMOTIDINE 20 MG: 20 TABLET ORAL at 08:04

## 2023-04-21 RX ADMIN — PROPOFOL 100 MCG/KG/MIN: 10 INJECTION, EMULSION INTRAVENOUS at 07:04

## 2023-04-21 RX ADMIN — CEFAZOLIN 2 G: 2 INJECTION, POWDER, FOR SOLUTION INTRAMUSCULAR; INTRAVENOUS at 04:04

## 2023-04-21 RX ADMIN — TRANEXAMIC ACID 2000 MG: 100 INJECTION, SOLUTION INTRAVENOUS at 06:04

## 2023-04-21 RX ADMIN — CETIRIZINE HYDROCHLORIDE 10 MG: 10 TABLET, FILM COATED ORAL at 08:04

## 2023-04-21 RX ADMIN — SODIUM CHLORIDE, SODIUM LACTATE, POTASSIUM CHLORIDE, AND CALCIUM CHLORIDE: .6; .31; .03; .02 INJECTION, SOLUTION INTRAVENOUS at 06:04

## 2023-04-21 RX ADMIN — SODIUM CHLORIDE: 9 INJECTION, SOLUTION INTRAVENOUS at 10:04

## 2023-04-21 RX ADMIN — DONEPEZIL HYDROCHLORIDE 10 MG: 5 TABLET, FILM COATED ORAL at 08:04

## 2023-04-21 RX ADMIN — ONDANSETRON HYDROCHLORIDE 4 MG: 2 INJECTION INTRAMUSCULAR; INTRAVENOUS at 06:04

## 2023-04-21 NOTE — PT/OT/SLP EVAL
Physical Therapy Evaluation and Treatment    Patient Name: Beth Flynn   MRN: [id number]  Recent Surgery: Procedure(s) (LRB):  ARTHROPLASTY, KNEE, TOTAL REPLACEMENT (Right) Day of Surgery    Recommendations:     Discharge Recommendations: home health PT, home health OT (progressing to OP PT)   Discharge Equipment Recommendations: walker, rolling, bedside commode (reports her previous RW was jose)   Barriers to discharge: Inaccessible home and Decreased caregiver support    Assessment:     Beth lFynn is a 67 y.o. female admitted with a medical diagnosis of Unilateral primary osteoarthritis, right knee. Pmhx significant for HTN, DM, back surgery. She presents with the following impairments/functional limitations: weakness, impaired self care skills, impaired functional mobility, gait instability, impaired balance, decreased lower extremity function, decreased safety awareness, impaired cognition, pain, decreased ROM, impaired skin, edema, orthopedic precautions.    Patient evaluated by PT and goals established. Patient with moderate pain during therapy session. AAROM knee flexion ROM 5-80. Bed mobility with Ricco, sit<>stand with CGA with RW, and amb x 100 ft with RW and CGA. Pt lethargic and falling asleep during therEx. PT will continue to follow and progress as tolerated. Rec for d/c to home with HH PT/OT progressing to OP PT.    Rehab Prognosis: Good; patient would benefit from acute PT services to address these deficits and reach maximum level of function.    Plan:     During this hospitalization, patient to be seen BID to address the above listed problems via gait training, therapeutic activities, therapeutic exercises    Plan of Care Expires: 04/28/23    Subjective     Chief Complaint: Pain in knee, kaleb posterior knee  Patient Comments/Goals: Goal to get better and stronger and to not have knee pain; Patient agreeable to evaluation.  Pain/Comfort:  Pain Rating 1:  (moderate)  Location -  Side 1: Right  Location 1: knee  Pain Addressed 1: Reposition, Distraction, Cessation of Activity, Pre-medicate for activity, Other (see comments) (therEx, amb, ice donned)    Social History:    Living Environment:  Pt lives alone in a 2 story home with threshold steps to enter and 13 steps to bedroom with R handrail(s).   Upon discharge, patient will have assistance from neighbors and grandson PRN, no one planning to stay with her.  Prior level of function:  Ambulation: Mod(I) with cane d/t knee pain  ADL's: Mod(I)  Falls: None reported  Equipment Used at Home: cane, straight, shower chair    Objective:     Communicated with HARESH Abraham prior to session. Patient found HOB elevated with peripheral IV, SCD, FCD, cryotherapy upon PT entry to room.    General Precautions: Standard, fall   Orthopedic Precautions: RLE weight bearing as tolerated   Braces: N/A    Respiratory Status: Room air    Patient donned non slip socks and gait belt for OOB mobility.     Exams:  Cognition:   Patient is oriented to name, , date, place, situation.  Pt follows approximately 80% of one step commands.    Mood: Pleasant and cooperative, lethargic and occasionally falling asleep while talking/performing therEx   Musculoskeletal:  Posture: Protective guarding surgical joint  LE ROM/Strength: 5/5 bilateral hip flexion, nonsurgical knee extension, bilateral ankle dorsiflexion. AROM surgical knee difficult to accurately assess with large bulky dressing, although knee flexion grossly 5>80 degrees. Surgical knee strength grossly 5/5 knee flexion and 3-/5 knee extension.  Neuromuscular:  Sensation: Intact to light touch bilateral LEs. Pt denied paresthesias.   Coordination/Tone/Reflexes: No impairments identified with functional mobility. No formal testing performed.   Balance: CGA for dynamic standing with bilateral UE support.   Visual-vestibular: No impairments identified with functional mobility. No formal testing performed.  Integument:   Visible skin intact and surgical extremity dressing clean and dry.   Cardiopulmonary:  Edema: Mild surgical extremity.    Color/temperature/pulses: Equal      Functional Mobility:  Gait belt applied - Yes  Bed Mobility  Supine to Sit: minimum assistance for LE management  Transfers  Sit to Stand: contact guard assistance with rolling walker and with cues for hand placement and weight bearing precautions  Gait - x100 ft with RW and CGA.  Gait deviations of decreased stride length with step to gait pattern, decreased surgical knee flexion, and decreased surgical heel strike/toe off. Increased double limb support time. With verbal cues noted improvements in knee extension.  No overt knee buckling noted    Therapeutic Activities and Exercises:  Patient educated on role of acute care PT and PT POC, safety while in hospital including calling nurse for mobility, and call light usage.  Educated about weightbearing precautions and provided cuing for adherence as appropriate during session.  Patient performed 1 set(s) of 10 repetitions of the following bed level exercises: ankle pumps, quad sets, glut sets, heel slides, hip abduction, and SLR< SAQ for right LE. Patient required skilled PT for instruction of exercises and appropriate cues to perform exercises safely and appropriately.  Educated about importance of OOB mobility and remaining up in chair most of the day.    AM-PAC 6 CLICK MOBILITY  Total Score:18    Patient left up in chair with all lines intact, call button in reach, and white board updated .    GOALS:   Multidisciplinary Problems       Physical Therapy Goals          Problem: Physical Therapy    Goal Priority Disciplines Outcome Goal Variances Interventions   Physical Therapy Goal     PT, PT/OT Ongoing, Progressing     Description: Goals to be met by: 23     Patient will increase functional independence with mobility by performin. Supine to sit with supervision.   2. Sit to supine with supervision.    3. Sit<>stand transfer with supervision using rolling walker.   4. Gait > 150 feet with SBA using rolling walker.   5. Ascend/descend 13 stairs with R handrails with SBA and RW.  6. Ascend/descend threshold stairs with no handrails with SBA and RW.                       History:     Past Medical History:   Diagnosis Date    Arthritis     Asthma     Depression     Diabetes mellitus     Elevated cholesterol     GERD (gastroesophageal reflux disease)     Hypertension     Seizures     only one    Sleep apnea     thinks she was told she has this, but does not have a cpap    Slow to wake up after anesthesia        Past Surgical History:   Procedure Laterality Date    BACK SURGERY       SECTION, LOW TRANSVERSE      CORONARY ANGIOPLASTY WITH STENT PLACEMENT      unsure about this procedure    HERNIA REPAIR      HYSTERECTOMY      TONSILLECTOMY         Time Tracking:     PT Received On: 23  PT Start Time: 1133  PT Stop Time: 1204  PT Total Time (min): 31 min     Billable Minutes: Evaluation 8, Gait Training 13, and Therapeutic Exercise 10    2023

## 2023-04-21 NOTE — TRANSFER OF CARE
"Anesthesia Transfer of Care Note    Patient: Beth Flynn    Procedure(s) Performed: Procedure(s) (LRB):  ARTHROPLASTY, KNEE, TOTAL REPLACEMENT (Right)    Patient location: PACU    Anesthesia Type: spinal    Transport from OR: Transported from OR on room air with adequate spontaneous ventilation    Post pain: adequate analgesia    Post assessment: no apparent anesthetic complications    Post vital signs: stable    Level of consciousness: awake and alert    Nausea/Vomiting: no nausea/vomiting    Complications: none    Transfer of care protocol was followed      Last vitals:   Visit Vitals  /73 (BP Location: Right arm, Patient Position: Sitting)   Pulse 88   Temp 37 °C (98.6 °F) (Oral)   Resp 16   Ht 5' 3" (1.6 m)   Wt 76.7 kg (169 lb)   SpO2 98%   Breastfeeding No   BMI 29.94 kg/m²     "

## 2023-04-21 NOTE — NURSING TRANSFER
Nursing Transfer Note      4/21/2023     Reason patient is being transferred: post op    Transfer To: room 363    Transfer via stretcher    Transfer with polar icewrap    Transported by rn    Medicines sent: no    Any special needs or follow-up needed: no    Chart send with patient: Yes    Notified: family    Patient reassessed at:  (date, time)    Upon arrival to floor:

## 2023-04-21 NOTE — PLAN OF CARE
The information below was gathered from the patient daughter Dacia over the phone.     Initial Discharge Planning Assessment:  Patient admitted on: 4/21/23     Chart reviewed, Care plan discussed with treatment team,  attending Dr. Ontiveros     PCP updated in Epic: Dr. Turcios, updated in Epic: Bedside       Current dispo: Home Health       Transportation: Family      Power of  or Living Will:      Anticipated DC needs from CM perspective:      Patient received a walker & bedside commode in 5/14/19. Patient insurance denied payment. Patient declined to pay out of pocket for RW. Patient stated she want a shower chair upon discharge.         04/21/23 0740   Discharge Assessment   Assessment Type Discharge Planning Assessment   Confirmed/corrected address, phone number and insurance Yes   Confirmed Demographics Correct on Facesheet   Source of Information patient;health record   People in Home alone   Do you expect to return to your current living situation? Yes   Do you have help at home or someone to help you manage your care at home? Yes   Prior to hospitilization cognitive status: Alert/Oriented   Current cognitive status: Alert/Oriented   Equipment Currently Used at Home cane, straight;shower chair   Readmission within 30 days? No   Patient currently being followed by outpatient case management? No   Do you currently have service(s) that help you manage your care at home? No   Do you take prescription medications? Yes   Do you have prescription coverage? Yes   Do you have any problems affording any of your prescribed medications? No   Is the patient taking medications as prescribed? yes   How do you get to doctors appointments? family or friend will provide   Are you on dialysis? No  (Diabetic)   Do you take coumadin? No   Discharge Plan A Home Health   Discharge Plan B Home with family;Rehab   DME Needed Upon Discharge  none   Discharge Plan discussed with: Adult children   Discharge Barriers  Identified None

## 2023-04-21 NOTE — PLAN OF CARE
Problem: Physical Therapy  Goal: Physical Therapy Goal  Description: Goals to be met by: 23     Patient will increase functional independence with mobility by performin. Supine to sit with supervision.   2. Sit to supine with supervision.   3. Sit<>stand transfer with supervision using rolling walker.   4. Gait > 150 feet with SBA using rolling walker.   5. Ascend/descend 13 stairs with R handrails with SBA and RW.  6. Ascend/descend threshold stairs with no handrails with SBA and RW.  Outcome: Ongoing, Progressing     Patient evaluated by PT and goals established. Patient with moderate pain during therapy session. AAROM knee flexion ROM 5-80. Bed mobility with Ricco, sit<>stand with CGA with RW, and amb x 100 ft with RW and CGA. Pt lethargic and falling asleep during therEx. PT will continue to follow and progress as tolerated. Rec for d/c to home with HH PT/OT progressing to OP PT. Please see progress note for detailed plan of care and recommendations.

## 2023-04-21 NOTE — CONSULTS
"Consult Note  MED    Consult Requested By: Desmond Ontiveros MD  Reason for Consult: HTN/DM/OA/MARYLIN/Depression.     SUBJECTIVE:     History of Present Illness:  Patient is a 67 y.o. female presents with scheduled right knee repair.  Seen post op on floor. VSS.  Preop/Epic reviewed.  No CP/SOB/N/V/D/F/C.      Past Medical History:   Diagnosis Date    Arthritis     Asthma     Depression     Diabetes mellitus     Elevated cholesterol     GERD (gastroesophageal reflux disease)     Hypertension     Seizures     only one    Sleep apnea     thinks she was told she has this, but does not have a cpap    Slow to wake up after anesthesia      Past Surgical History:   Procedure Laterality Date    BACK SURGERY       SECTION, LOW TRANSVERSE      CORONARY ANGIOPLASTY WITH STENT PLACEMENT      unsure about this procedure    HERNIA REPAIR      HYSTERECTOMY      TONSILLECTOMY       Family History   Problem Relation Age of Onset    Cancer Mother     Heart disease Father      Social History     Tobacco Use    Smoking status: Never    Smokeless tobacco: Never   Substance Use Topics    Alcohol use: No    Drug use: No       Review of patient's allergies indicates:   Allergen Reactions    Erythromycin Other (See Comments)     "dehydration"    Tuna oil Swelling    Morphine Anxiety    Phenergan [promethazine] Anxiety    Sulfa (sulfonamide antibiotics) Anxiety        Review of Systems:  Constitutional: No fever or chills  Respiratory: No cough or shortness of breath  Cardiovascular: No chest pain or palpitations  Gastrointestinal: No nausea or vomiting  Neurological: No confusion or weakness    OBJECTIVE:     Vital Signs (Most Recent)  Temp: 97.6 °F (36.4 °C) (2315)  Pulse: 93 (23)  Resp: 17 (23)  BP: 131/62 (23)  SpO2: 100 % (23)    Vital Signs Range (Last 24H):  Temp:  [96.9 °F (36.1 °C)-98.6 °F (37 °C)]   Pulse:  [87-93]   Resp:  [16-19]   BP: (107-136)/(52-74)   SpO2:  [98 %-100 %] "       Intake/Output Summary (Last 24 hours) at 4/21/2023 1023  Last data filed at 4/21/2023 0802  Gross per 24 hour   Intake 1400 ml   Output --   Net 1400 ml       Physical Exam:  General appearance: Well developed, well nourished  Eyes:  Conjunctivae/corneas clear. PERRL.  Lungs: Normal respiratory effort,   clear to auscultation bilaterally   Heart: Regular rate and rhythm, S1, S2 normal, no murmur, rub or melly.  Abdomen: Soft, non-tender non-distended; bowel sounds normal; no masses,  no organomegaly  Extremities: No cyanosis or clubbing. No edema.    Skin: Skin color, texture, turgor normal. No rashes or lesions  Neurologic: Normal strength and tone. No focal numbness or weakness   Right knee CDI      Laboratory:  No results for input(s): WBC, RBC, HGB, HCT, PLT, MCV, MCH, MCHC in the last 24 hours.  BMP: No results for input(s): GLU, NA, K, CL, CO2, BUN, CREATININE, CALCIUM, MG in the last 168 hours.    Invalid input(s):  PHOS  Lab Results   Component Value Date    CALCIUM 9.0 03/24/2023     BNP  No results for input(s): BNP, BNPTRIAGEBLO in the last 168 hours.No results found for: URICACIDNo results found for: IRON, TIBC, FERRITIN, SATURATEDIRO  Lab Results   Component Value Date    CALCIUM 9.0 03/24/2023       Diagnostic Results:  X-Ray Knee 1 or 2 View Right   Final Result      Please see above.         Electronically signed by: Radha Diego   Date:    04/21/2023   Time:    09:54          ASSESSMENT/PLAN:     Right Knee:  per ortho/therapy teams.    HTN:  BB only    DM:  metformin, ADA, SSI.  Recommend SGLT2 and GLP as outpt.      Depression:  meds reviewed and reordered.     MARYLIN:  doesn't wear at home but will order QHS here due to narcs/pain meds.     DVT:  ASA BID.      Thanks for consult  See above  Will follow along.

## 2023-04-21 NOTE — ANESTHESIA PROCEDURE NOTES
Spinal    Diagnosis: arthritis  Patient location during procedure: holding area  Start time: 4/21/2023 6:36 AM  Timeout: 4/21/2023 6:34 AM  End time: 4/21/2023 6:39 AM    Staffing  Authorizing Provider: Jin Bustillos MD  Performing Provider: Jin Bustillos MD    Preanesthetic Checklist  Completed: patient identified, IV checked, site marked, risks and benefits discussed, surgical consent, monitors and equipment checked, pre-op evaluation and timeout performed  Spinal Block  Patient position: sitting  Prep: Betadine  Patient monitoring: cardiac monitor, continuous pulse ox and frequent blood pressure checks  Approach: midline  Location: L2-3  Injection technique: single shot  CSF Fluid: clear free-flowing CSF  Needle  Needle gauge: 25 G  Needle length: 3.5 in  Needle localization: anatomical landmarks  Assessment  Sensory level: T4  Patient's tolerance of the procedure: comfortable throughout block  Medications:    Medications: ropivacaine (NAROPIN) injection 0.5% - Intraspinal   3 mL - 4/21/2023 6:38:00 AM

## 2023-04-21 NOTE — PLAN OF CARE
[facility]  [address]  [place] [address]  [phone]               [facility] Orthopedic Discharge Orders    Home Mikey         Expected Discharge Date: 4/22/23    Diagnoses:  Post-op  right knee(s) replacement    Patient is homebound due to:   Pt requires home health services due to taxing effort to leave the home as a result of immobility from Post-op right knee(s) replacement    Weight Bearing Status:   full weight bearing: FWB unless otherwise indicated.  Progress to cane as able.  Set up for outpatient PT as soon as able after staple removal once patient is MOD 1 with cane.    Physical Therapy:   3 times a week for 2 weeks (Call for further orders beyond 2 weeks)  - Ambulate with a rolling walker  - Progress to cane  - Instruct on ROM and strengthening of knee    Aide to provide assistance with personal care and  ADLs  2 times a week for 2 weeks    Wound Care:   Surgical dressing change:Starting on  post op day 2 then, 3 times per week and prn saturation.Change dressing while knee in flexed position utilizing island dressing or apply gauze and cover with tegaderm.  Teach patient to change daily if draining.  Pt may shower if surgical dressing is waterproof. Protect surgical dressing from getting wet by using press and seal saranwrap or garbage bag. Removal and replacement of dressing after shower only needed if incision is suspected  to have gotten wet during shower.  Otherwise change as previously described depending on dressing/drainage. No soaking in the tub or hot tub use for 6 weeks.    PT/SN to remove staples 14 days Post-op and apply skin prep and steri-strips.    Cold therapy/Ice: Encouraged at least 20 minutes 2-3 times daily or more if desired.  Incision must be kept waterproof while icing.  Wear TEDS Bilateral Thigh High Stockings for 3 weeks. Kenji hose x 3 weeks. Ok to remove kenji hose 1-2 hours/day max if desired.   If CPM ordered Utilize 2 hours in morning and 2  hours in evening. , Start at -5 degrees extension and 50 degree flexion. Increase flexion 10 degrees daily. Low post op mobility.       Contact:  Please contact Dr Desmond Ontiveros [phone] with concerns.        BLOOD THINNER:    If sent home on Xarelto         -14 days post-op for TKR       -30 days post-op for THR     If sent home home on ASA    81mg   BID x 4 weeks     Once finished with prescribed blood thinner, patients can return to pre-surgical ASA dosage if they took ASA before surgery.       Outpatient Therapy: [facility]    [address] [address]   or  [address] [place] [address]    Call [phone] to schedule appointment  Fax [phone]    If need orders: Call Arabella at Ext [phone]        DME:  - rolling Walker  - 3 in 1 commode.   - tub bench / shower chair  - Per PT/OT recommendation          Desmond Ontiveros

## 2023-04-21 NOTE — ANESTHESIA POSTPROCEDURE EVALUATION
Anesthesia Post Evaluation    Patient: Beth Flynn    Procedure(s) Performed: Procedure(s) (LRB):  ARTHROPLASTY, KNEE, TOTAL REPLACEMENT (Right)    Final Anesthesia Type: spinal      Patient location during evaluation: PACU  Patient participation: Yes- Able to Participate  Level of consciousness: awake and alert  Post-procedure vital signs: reviewed and stable  Pain management: adequate  Airway patency: patent    PONV status at discharge: No PONV  Anesthetic complications: no      Cardiovascular status: blood pressure returned to baseline and stable  Respiratory status: room air  Hydration status: euvolemic  Follow-up not needed.          Vitals Value Taken Time   /55 04/21/23 0900   Temp 36.3 °C (97.3 °F) 04/21/23 0830   Pulse 90 04/21/23 0903   Resp 17 04/21/23 0900   SpO2 98 % 04/21/23 0903   Vitals shown include unvalidated device data.      No case tracking events are documented in the log.      Pain/Kinsey Score: Pain Rating Prior to Med Admin: 7 (4/21/2023  8:29 AM)  Pain Rating Post Med Admin: 5 (4/21/2023  8:32 AM)  Kinsey Score: 10 (4/21/2023  8:32 AM)

## 2023-04-21 NOTE — PT/OT/SLP PROGRESS
Physical Therapy Treatment    Patient Name:  Beth Flynn   MRN:  [id number]    Recommendations:     Discharge Recommendations: home health PT, home health OT (progressing to OP PT)  Discharge Equipment Recommendations: walker, rolling, bedside commode (reports her previous RW was jose)  Barriers to discharge: Inaccessible home and Decreased caregiver support    Assessment:     Beth Flynn is a 67 y.o. female admitted with a medical diagnosis of Unilateral primary osteoarthritis, right knee. Pmhx significant for HTN, DM, back surgery.  She presents with the following impairments/functional limitations: weakness, impaired self care skills, impaired functional mobility, gait instability, impaired balance, decreased lower extremity function, decreased safety awareness, impaired cognition, pain, decreased ROM, impaired skin, edema, orthopedic precautions.    Patient progressing with gait and transfers, able to perform with SBA with cueing for hand placement and RW management. Initiated stair training, requires cueing for sequencing and will need f/u training. Rec for dc to home with HH, concerning pt without initial 24/7 assistance with stairs to navigate within her home with increased risk for falls.     Rehab Prognosis: Good; patient would benefit from acute skilled PT services to address these deficits and reach maximum level of function.    Recent Surgery: Procedure(s) (LRB):  ARTHROPLASTY, KNEE, TOTAL REPLACEMENT (Right) Day of Surgery    Plan:     During this hospitalization, patient to be seen BID to address the identified rehab impairments via gait training, therapeutic activities, therapeutic exercises and progress toward the following goals:    Plan of Care Expires:  04/28/23    Subjective     Chief Complaint: Pain in knee increased with stairs  Patient/Family Comments/goals: Goal to return home; Patient agreeable to PT treatment.  Pain/Comfort:  Pain Rating 1:  (imrpoved since  "eval)  Location - Side 1: Right  Location 1: knee  Pain Addressed 1: Reposition, Distraction, Cessation of Activity, Pre-medicate for activity, Other (see comments) (therEx, amb, ice donned)  Pain Rating Post-Intervention 1:  (increased after stairs)      Objective:     Communicated with RN prior to session.  Patient found up in chair with cryotherapy (perip IV found removed by pt) upon PT entry to room.     General Precautions: Standard, fall  Orthopedic Precautions: RLE weight bearing as tolerated  Braces: N/A  Respiratory Status: Room air     Patient donned non slip socks and gait belt for OOB mobility.     Functional Mobility:  Bed Mobility:     Sit to Supine: contact guard assistance  Transfers:     Sit to Stand:  stand by assistance with rolling walker  From chair 3x and toilet 1x  Gait: x120 ft with RW and CGA progressing to SBA.   Carry over of step thru gait with increased heel strike on surgical extremity.   No overt LOB noted.   Slight forward flexion of trunk noted.   Stairs:    Pt ascended/descended 4" curb step with Rolling Walker with no handrails with Contact Guard Assistance.   Pt ascended/descended  4 stair(s) with Rolling Walker with right handrail with Minimal Assistance.   Demo of both stair navigation performed prior to pt attempt  Requires cueing for sequencing for both stairs, more with 4 stairs  Difficulty managing RW on stairs (folded up), will not have consistent assistance to carry RW for her       AM-PAC 6 CLICK MOBILITY  Turning over in bed (including adjusting bedclothes, sheets and blankets)?: 3  Sitting down on and standing up from a chair with arms (e.g., wheelchair, bedside commode, etc.): 3  Moving from lying on back to sitting on the side of the bed?: 3  Moving to and from a bed to a chair (including a wheelchair)?: 3  Need to walk in hospital room?: 3  Climbing 3-5 steps with a railing?: 3  Basic Mobility Total Score: 18       Treatment & Education:  Pt performed supine " therapeutic exercises including ankle pumps, quad sets, glute sets, SLR, heel slides x 10 reps with verbal and tactile cues.   Continued lethargy requiring continuous cues to maintain alert state  Limited further therEx d/t arousal    Patient left HOB elevated with all lines intact, call button in reach, bed alarm on, and RN notified..    GOALS:   Multidisciplinary Problems       Physical Therapy Goals          Problem: Physical Therapy    Goal Priority Disciplines Outcome Goal Variances Interventions   Physical Therapy Goal     PT, PT/OT Ongoing, Progressing     Description: Goals to be met by: 23     Patient will increase functional independence with mobility by performin. Supine to sit with supervision.   2. Sit to supine with supervision.   3. Sit<>stand transfer with supervision using rolling walker.   4. Gait > 150 feet with SBA using rolling walker.   5. Ascend/descend 13 stairs with R handrails with SBA and RW.  6. Ascend/descend threshold stairs with no handrails with SBA and RW.                       Time Tracking:     PT Received On: 23  PT Start Time: 1415     PT Stop Time: 1444  PT Total Time (min): 29 min     Billable Minutes: Gait Training 21 and Therapeutic Exercise 8    Treatment Type: Treatment  PT/PTA: PT     Number of PTA visits since last PT visit: 0     2023

## 2023-04-21 NOTE — OP NOTE
[facility]  Operative Report    SUMMARY     Surgery Date: 4/21/2023     Surgeon(s) and Role:     * Desmond Ontiveros MD - Primary    Assistant: KANIKA Castellano    Pre-op Diagnosis:  Unilateral primary osteoarthritis, right knee [M17.11]    Post-op Diagnosis:  Post-Op Diagnosis Codes:     * Unilateral primary osteoarthritis, right knee [M17.11]    Procedure(s) (LRB):  ARTHROPLASTY, KNEE, TOTAL REPLACEMENT (Right) ([facility])    Anesthesia: Spinal    Description of Procedure: The appropriate consent was signed. The patient understood and except all risks and complications. The patient was brought to the Operating Room after undergoing spinal anesthetic. Tourniquet was applied to the proximal operative leg. The lower extremity was then prepped and draped in a sterile manner. After exsanguination of Esmarch bandage, tourniquet was inflated to 300 mmHg. An anterior incision with a medial parapatellar arthrotomy was performed. The menisci, anterior cruciate ligament and patellar fat pad were excised. The patella was resurfaced and sized to a 32 mm patella.   Three holes were then drilled for the lugs and this was trialed. A hole was then  drilled in the distal femur, babatunde was placed down the femoral canal and the   distal femur cut in 6 degrees of valgus. The tibia was then cut  with the extramedullary device   in 0 degree varus valgus with 5 degrees in posterior   slope. Extension block was placed and full extension was noted. The femur was   then sized to a #6 and #6 cutting block was placed and the anterior and   posterior cuts as well as chamfer cuts were performed. The tibia was sized to a  size C and a trial was performed.Trials were performed and a 10 mm UC spacer was felt to be appropriate.The tibia was then prepared with the drill and the punch, and trials were   removed and the knee washed out. Aquamantys was used to paint the posterior   capsule and corners. Palacos cement with gentamicin was  then mixed and   pressurized sequentially in tibia, femur and patella. Components were impacted   and excess cement was removed. A trial 10 mm UC spacer was placed and knee   brought out to full extension. Once the cement was allowed to harden, the 10 mm UC  spacer was felt to be appropriate and this was locked into the tibial   baseplate. Tourniquet was deflated and hemostasis was obtained. Good patellar   tracking was noted. Exparel cocktail was injected into the fascia and   subcutaneous tissue. The fascial closure was obtained with a running #2 Quill suture. Subcutaneous closure was obtained with #1 and 2-0 Vicryl. Skin was then closed with the staples and a sterile compressive dressing was applied. The patient was then brought to the Recovery Room in a good condition.        Estimated Blood Loss: 100cc         Specimens:   Specimen (24h ago, onward)      None

## 2023-04-22 LAB
ERYTHROCYTE [DISTWIDTH] IN BLOOD BY AUTOMATED COUNT: 12.7 % (ref 11.5–14.5)
HCT VFR BLD AUTO: 34.6 % (ref 37–48.5)
HGB BLD-MCNC: 11 G/DL (ref 12–16)
MCH RBC QN AUTO: 29 PG (ref 27–31)
MCHC RBC AUTO-ENTMCNC: 31.8 G/DL (ref 32–36)
MCV RBC AUTO: 91 FL (ref 82–98)
PLATELET # BLD AUTO: 220 K/UL (ref 150–450)
PMV BLD AUTO: 9.2 FL (ref 9.2–12.9)
POCT GLUCOSE: 116 MG/DL (ref 70–110)
POCT GLUCOSE: 165 MG/DL (ref 70–110)
POCT GLUCOSE: 183 MG/DL (ref 70–110)
RBC # BLD AUTO: 3.79 M/UL (ref 4–5.4)
WBC # BLD AUTO: 10.06 K/UL (ref 3.9–12.7)

## 2023-04-22 PROCEDURE — 97166 OT EVAL MOD COMPLEX 45 MIN: CPT

## 2023-04-22 PROCEDURE — 27000190 HC CPAP FULL FACE MASK W/VALVE

## 2023-04-22 PROCEDURE — 97530 THERAPEUTIC ACTIVITIES: CPT | Mod: CQ

## 2023-04-22 PROCEDURE — 25000003 PHARM REV CODE 250: Performed by: ORTHOPAEDIC SURGERY

## 2023-04-22 PROCEDURE — 97116 GAIT TRAINING THERAPY: CPT | Mod: CQ

## 2023-04-22 PROCEDURE — 94660 CPAP INITIATION&MGMT: CPT

## 2023-04-22 PROCEDURE — 36415 COLL VENOUS BLD VENIPUNCTURE: CPT | Performed by: ORTHOPAEDIC SURGERY

## 2023-04-22 PROCEDURE — 94761 N-INVAS EAR/PLS OXIMETRY MLT: CPT

## 2023-04-22 PROCEDURE — 85027 COMPLETE CBC AUTOMATED: CPT | Performed by: ORTHOPAEDIC SURGERY

## 2023-04-22 PROCEDURE — 97535 SELF CARE MNGMENT TRAINING: CPT

## 2023-04-22 PROCEDURE — 99900035 HC TECH TIME PER 15 MIN (STAT)

## 2023-04-22 PROCEDURE — 25000003 PHARM REV CODE 250: Performed by: NURSE PRACTITIONER

## 2023-04-22 RX ADMIN — ASPIRIN 81 MG CHEWABLE TABLET 81 MG: 81 TABLET CHEWABLE at 08:04

## 2023-04-22 RX ADMIN — DOCUSATE SODIUM 100 MG: 100 CAPSULE, LIQUID FILLED ORAL at 08:04

## 2023-04-22 RX ADMIN — METFORMIN HYDROCHLORIDE 500 MG: 500 TABLET, FILM COATED ORAL at 04:04

## 2023-04-22 RX ADMIN — FAMOTIDINE 20 MG: 20 TABLET ORAL at 10:04

## 2023-04-22 RX ADMIN — FAMOTIDINE 20 MG: 20 TABLET ORAL at 08:04

## 2023-04-22 RX ADMIN — BUSPIRONE HYDROCHLORIDE 10 MG: 10 TABLET ORAL at 10:04

## 2023-04-22 RX ADMIN — POLYETHYLENE GLYCOL 3350 17 G: 17 POWDER, FOR SOLUTION ORAL at 08:04

## 2023-04-22 RX ADMIN — PROPRANOLOL HYDROCHLORIDE 40 MG: 10 TABLET ORAL at 10:04

## 2023-04-22 RX ADMIN — CETIRIZINE HYDROCHLORIDE 10 MG: 10 TABLET, FILM COATED ORAL at 10:04

## 2023-04-22 RX ADMIN — MONTELUKAST 10 MG: 10 TABLET, FILM COATED ORAL at 10:04

## 2023-04-22 RX ADMIN — BENZTROPINE MESYLATE 1 MG: 1 TABLET ORAL at 08:04

## 2023-04-22 RX ADMIN — METFORMIN HYDROCHLORIDE 500 MG: 500 TABLET, FILM COATED ORAL at 08:04

## 2023-04-22 RX ADMIN — SODIUM CHLORIDE: 9 INJECTION, SOLUTION INTRAVENOUS at 04:04

## 2023-04-22 RX ADMIN — GABAPENTIN 300 MG: 300 CAPSULE ORAL at 02:04

## 2023-04-22 RX ADMIN — ACETAMINOPHEN 1000 MG: 500 TABLET, FILM COATED ORAL at 06:04

## 2023-04-22 RX ADMIN — TRAZODONE HYDROCHLORIDE 100 MG: 100 TABLET ORAL at 10:04

## 2023-04-22 RX ADMIN — ASPIRIN 81 MG CHEWABLE TABLET 81 MG: 81 TABLET CHEWABLE at 10:04

## 2023-04-22 RX ADMIN — DULOXETINE 60 MG: 30 CAPSULE, DELAYED RELEASE ORAL at 08:04

## 2023-04-22 RX ADMIN — GABAPENTIN 300 MG: 300 CAPSULE ORAL at 10:04

## 2023-04-22 RX ADMIN — CELECOXIB 200 MG: 200 CAPSULE ORAL at 08:04

## 2023-04-22 RX ADMIN — MUPIROCIN 1 G: 20 OINTMENT TOPICAL at 09:04

## 2023-04-22 RX ADMIN — ATORVASTATIN CALCIUM 40 MG: 20 TABLET, FILM COATED ORAL at 10:04

## 2023-04-22 RX ADMIN — DOCUSATE SODIUM 100 MG: 100 CAPSULE, LIQUID FILLED ORAL at 10:04

## 2023-04-22 RX ADMIN — OXYCODONE HYDROCHLORIDE 10 MG: 5 TABLET ORAL at 07:04

## 2023-04-22 RX ADMIN — GABAPENTIN 300 MG: 300 CAPSULE ORAL at 08:04

## 2023-04-22 NOTE — PT/OT/SLP EVAL
Occupational Therapy   Evaluation and Treatment    Name: Beth Flynn  MRN: [id number]  Admitting Diagnosis: Unilateral primary osteoarthritis, right knee  Recent Surgery: Procedure(s) (LRB):  ARTHROPLASTY, KNEE, TOTAL REPLACEMENT (Right) 1 Day Post-Op    Recommendations:     Discharge Recommendations:  (Post acute OT needs TBD pending progress though anticipate need for home health OT/PT.)  Discharge Equipment Recommendations:  bedside commode, bath bench, walker, rolling  Barriers to discharge:  Inaccessible home environment, Decreased caregiver support (current functional level)    Assessment:   Initial OT eval/treat complete.  Pt. Refusing  though requires reminder to speak English as she switches between the 2 languages.  Consistent cuing for safe hand placement and RLE positioning during transitions with MIN follow through.  Ambulating with RW with CGA for steadying/safety.  Pt. With attempting to discard RW requiring increased cues for RW proximity throughout functional transfers and during ADL tasks.  Voiding while seated at BSC with CGA for safety while cleaning front stephanie region.  CGA for hand hygiene with cues for safe RW positioning and retrieval of needed items.  Has shower chair and QC in use PTA.  Needs RW, BSC, and TTB.  Post acute OT needs TBD pending progress though anticipate need for home health OT/PT.  To benefit from continued acute care OT services to increase independence in self-care/functional transfers.  OT to follow.     Beth Flynn is a 67 y.o. female with a medical diagnosis of Unilateral primary osteoarthritis, right knee.  She presents with below deficits decreasing independence in self-care/functional transfers. Performance deficits affecting function: weakness, impaired endurance, impaired self care skills, impaired functional mobility, gait instability, impaired balance, impaired cognition, decreased upper extremity function, decreased lower extremity  function, decreased safety awareness, pain, decreased ROM, orthopedic precautions, impaired skin.      Rehab Prognosis: Good; patient would benefit from acute skilled OT services to address these deficits and reach maximum level of function.       Plan:     Patient to be seen daily to address the above listed problems via self-care/home management, therapeutic activities, therapeutic exercises  Plan of Care Expires: 04/29/23  Plan of Care Reviewed with: patient    Subjective     Chief Complaint: With c/o RLE knee pain.   Patient/Family Comments/goals: No goals stated at this time.     Occupational Profile:  Lives alone in [address] [place] [address] with 2 flights of stairs to reach; bathroom setup as tub/shower combo with shower chair and standard toilet.  Previously MOD I with ADL, IADL, and still drives though reports that her granddaughter and grandson also drive for her as well.   Equipment Used at Home: shower chair, cane, quad  Assistance upon Discharge: Lives alone.    Pain/Comfort:  Pain Rating 1: 7/10 (with activity)  Location - Side 1: Right  Location 1: knee  Pain Addressed 1: Distraction, Cessation of Activity, Nurse notified  Pain Rating Post-Intervention 1: 4/10 (at rest)    Patients cultural, spiritual, Restoration conflicts given the current situation:  (None stated.)    Objective:     Communicated with: Magnus Hooker RN prior to session.  Patient found HOB elevated with peripheral IV, SCD, FCD, cryotherapy upon OT entry to room.    General Precautions: Standard, fall  Orthopedic Precautions: RLE weight bearing as tolerated  Braces: N/A  Respiratory Status: Room air    Occupational Performance:    Bed Mobility:    Supine>sit with MIN   Assist for RLE management  HOB elevated    Functional Mobility/Transfers:  Sit<>stand X 2 trials EOB<>RW with CGA  Steadying/safety needed  Cuing for safe hand placement and RLE positioning for pain management with MOD follow through  Ambulating bed>bathroom>bed>bedside chair  with RW CGA  Needed for steadying/safety  Short steps noted  Step transfer to BSC frame over toilet CGA  Needed for steadying/safety  Increased cues for step direction  Increased cues for maintaining RW proximity throughout transfer  Step transfer to beside chair and sit<>stand from bedside chair X 2 trials  Needed for steadying/safety  Increased cues for step direction  Increased cues for maintaining RW proximity throughout transfer  Given demos on safe RLE positioning during pain management  Verbalized understanding with better follow through of instruction upon 2nd sit<>stand from bedside chair    Activities of Daily Living:  Toileting with CGA  Needed for steadying/safety during standing front stephanie hygiene  Handed wipes  Hand hygiene in stance at sink  Retrieval of soap needed  Cues for RW positioning at sink  Cues for RW proximity    Cognitive/Visual Perceptual:  Cognitive/Psychosocial Skills:  -       Oriented to: Person, Place, and Situation   -       Follows Commands/attention:Follows one-step commands  -       Communication: Pt. Refusing  though requires reminder to speak English as she switches between the 2 languages.  -       Memory: Deficits noted  -       Safety awareness/insight to disability: impaired   -       Mood/Affect/Coping skills/emotional control: Cooperative  Visual/Perceptual:  -grossly intact; wears glasses    Physical Exam:  Postural examination/scapula alignment: -       Rounded shoulders  -       Forward head  Skin integrity: Visible skin intact and ace wrap noted to RLE over cryotherapy padding  Dominant hand: -       R  Upper Extremity Range of Motion:  -       Right Upper Extremity: WFL  -       Left Upper Extremity: WFL  Upper Extremity Strength: -       Right Upper Extremity: WFL  -       Left Upper Extremity: WFL   Strength: -       Right Upper Extremity: WFL  -       Left Upper Extremity: WFL  Fine Motor Coordination: -       Intact  Left hand thumb/finger  opposition skills and Right hand thumb/finger opposition skills    [facility] 6 Click ADL:  [facility] Total Score: 16    Treatment & Education:  Educated on role of OT and POC  Supine>sit with MIN   Assist for RLE management  HOB elevated  Sit<>stand X 2 trials EOB<>RW with CGA  Steadying/safety needed  Cuing for safe hand placement and RLE positioning for pain management with MOD follow through  Ambulating bed>bathroom>bed>bedside chair with RW CGA  Needed for steadying/safety  Short steps noted  Step transfer to [facility] frame over toilet CGA  Needed for steadying/safety  Increased cues for step direction  Increased cues for maintaining RW proximity throughout transfer  Step transfer to beside chair and sit<>stand from bedside chair X 2 trials  Needed for steadying/safety  Increased cues for step direction  Increased cues for maintaining RW proximity throughout transfer  Given demos on safe RLE positioning during pain management  Verbalized understanding with better follow through of instruction upon 2nd sit<>stand from bedside chair  Toileting with CGA  Needed for steadying/safety during standing front stephanie hygiene  Handed wipes  Hand hygiene in stance at sink  Retrieval of soap needed  Cues for RW positioning at sink  Cues for RW proximity  Received call light review and importance of calling for assist as needed    Patient left up in chair with all lines intact, call button in reach, chair alarm on, and nursing notified    GOALS:   Multidisciplinary Problems       Occupational Therapy Goals          Problem: Occupational Therapy    Goal Priority Disciplines Outcome Interventions   Occupational Therapy Goal     OT, PT/OT Ongoing, Progressing    Description: Goals to be met by: 4/29/2023     Patient will increase functional independence with ADLs by performing:    UE Dressing with Supervision.  LE Dressing with Supervision.  Grooming while standing at sink with Supervision.  Toileting from bedside commode with Supervision for  hygiene and clothing management.   Toilet transfer to bedside commode with Supervision.                         History:     Past Medical History:   Diagnosis Date    Arthritis     Asthma     Depression     Diabetes mellitus     Elevated cholesterol     GERD (gastroesophageal reflux disease)     Hypertension     Seizures     only one    Sleep apnea     thinks she was told she has this, but does not have a cpap    Slow to wake up after anesthesia          Past Surgical History:   Procedure Laterality Date    BACK SURGERY       SECTION, LOW TRANSVERSE      CORONARY ANGIOPLASTY WITH STENT PLACEMENT      unsure about this procedure    HERNIA REPAIR      HYSTERECTOMY      TONSILLECTOMY         Time Tracking:     OT Date of Treatment: 23  OT Start Time: 170  OT Stop Time: 174  OT Total Time (min): 40 min (10-minutes finding a chair alarm)    Billable Minutes:Evaluation 15  Self Care/Home Management 15    2023

## 2023-04-22 NOTE — PLAN OF CARE
Problem: Occupational Therapy  Goal: Occupational Therapy Goal  Description: Goals to be met by: 4/29/2023     Patient will increase functional independence with ADLs by performing:    UE Dressing with Supervision.  LE Dressing with Supervision.  Grooming while standing at sink with Supervision.  Toileting from bedside commode with Supervision for hygiene and clothing management.   Toilet transfer to bedside commode with Supervision.    Outcome: Ongoing, Progressing     Initial OT eval/treat complete.  Consistent cuing for safe hand placement and RLE positioning during transitions with MIN follow through.  Ambulating with RW with CGA for steadying/safety.  Pt. With attempting to discard RW requiring increased cues for RW proximity throughout functional transfers and during ADL tasks.  Voiding while seated at BSC with CGA for safety while cleaning front stephanie region.  CGA for hand hygiene with cues for safe RW positioning and retrieval of needed items.  Has shower chair and QC in use PTA.  Needs RW, BSC, and TTB.  Post acute OT needs TBD pending progress though anticipate need for home health OT/PT.  To benefit from continued acute care OT services to increase independence in self-care/functional transfers.  OT to follow.

## 2023-04-22 NOTE — PLAN OF CARE
Problem: Physical Therapy  Goal: Physical Therapy Goal  Description: Goals to be met by: 23     Patient will increase functional independence with mobility by performin. Supine to sit with supervision.   2. Sit to supine with supervision.   3. Sit<>stand transfer with supervision using rolling walker.   4. Gait > 150 feet with SBA using rolling walker.   5. Ascend/descend 13 stairs with R handrails with SBA and RW.  6. Ascend/descend threshold stairs with no handrails with SBA and RW.  Outcome: Ongoing, Progressing   Gt training  13 min.  Pt sitting up in chair, IV found out of her arm and thrown on bed, nsg notified, pt then stood w/ min.A and cueing for hand placement. Amb'd 12' around bed w/RW and min.A, MAX cueing for safety (pt pushing walker too far ahead of her), ret'd to bed w/ modA at LE's. Pt perf'd AAROM RLE ex's of AP's, SLR's, hip abd/add, heelslides x 10 ea. Pt w/ good knee ROM, ~90-95*, very little signs of pain. Pt left w/ SCD to LLE, cryotherapy to R knee, bed alarm on, nsg. Notified. Pt sleeping before leaving room.

## 2023-04-22 NOTE — PLAN OF CARE
Pain medication given at 2300.  Patient slept well this shift.    Problem: Adult Inpatient Plan of Care  Goal: Plan of Care Review  Outcome: Ongoing, Progressing  Goal: Patient-Specific Goal (Individualized)  Outcome: Ongoing, Progressing  Goal: Absence of Hospital-Acquired Illness or Injury  Outcome: Ongoing, Progressing  Goal: Optimal Comfort and Wellbeing  Outcome: Ongoing, Progressing  Goal: Readiness for Transition of Care  Outcome: Ongoing, Progressing     Problem: Diabetes Comorbidity  Goal: Blood Glucose Level Within Targeted Range  Outcome: Ongoing, Progressing     Problem: Infection  Goal: Absence of Infection Signs and Symptoms  Outcome: Ongoing, Progressing     Problem: Fall Injury Risk  Goal: Absence of Fall and Fall-Related Injury  Outcome: Ongoing, Progressing

## 2023-04-22 NOTE — PT/OT/SLP PROGRESS
Occupational Therapy      Patient Name:  Beth Flynn   MRN:  [id number]    Patient not seen today secondary to Pt. Being extremely lethargic with RN request to defer OT session until later this P.M. Will follow-up later today.    4/22/2023

## 2023-04-22 NOTE — PLAN OF CARE
04/22/23 0754   Final Note   Assessment Type Final Discharge Note   Anticipated Discharge Disposition Home-Health   Hospital Resources/Appts/Education Provided Provided patient/caregiver with written discharge plan information;Appointments scheduled and added to AVS;Appointments scheduled by Navigator/Coordinator   Post-Acute Status   Discharge Delays None known at this time     Patient will discharge home with home health. (Jorge )    Patient will be transported home by family.     Patient reported having  BSC & RW at home from 5/2019 surgery.     Patient discharge needs have been addressed.

## 2023-04-22 NOTE — PROGRESS NOTES
"Consult Note  MED    Consult Requested By: Desmond Ontiveros MD  Reason for Consult: HTN/DM/OA/MARYLIN/Depression.     SUBJECTIVE:     History of Present Illness:  Patient is a 67 y.o. female presents with scheduled right knee repair.  Seen post op on floor. VSS.  Preop/Epic reviewed.  No CP/SOB/N/V/D/F/C.    Interval history: Arousable but appears lethargic. Denies pain.    Past Medical History:   Diagnosis Date    Arthritis     Asthma     Depression     Diabetes mellitus     Elevated cholesterol     GERD (gastroesophageal reflux disease)     Hypertension     Seizures     only one    Sleep apnea     thinks she was told she has this, but does not have a cpap    Slow to wake up after anesthesia      Past Surgical History:   Procedure Laterality Date    BACK SURGERY       SECTION, LOW TRANSVERSE      CORONARY ANGIOPLASTY WITH STENT PLACEMENT      unsure about this procedure    HERNIA REPAIR      HYSTERECTOMY      TONSILLECTOMY       Family History   Problem Relation Age of Onset    Cancer Mother     Heart disease Father      Social History     Tobacco Use    Smoking status: Never    Smokeless tobacco: Never   Substance Use Topics    Alcohol use: No    Drug use: No       Review of patient's allergies indicates:   Allergen Reactions    Erythromycin Other (See Comments)     "dehydration"    Tuna oil Swelling    Morphine Anxiety    Phenergan [promethazine] Anxiety    Sulfa (sulfonamide antibiotics) Anxiety        Review of Systems:  Constitutional: No fever or chills  Respiratory: No cough or shortness of breath  Cardiovascular: No chest pain or palpitations  Gastrointestinal: No nausea or vomiting  Neurological: No confusion or weakness    OBJECTIVE:     Vital Signs (Most Recent)  Temp: 98 °F (36.7 °C) (23 1115)  Pulse: 83 (23 1154)  Resp: 18 (23 1115)  BP: 103/61 (23 1154)  SpO2: (!) 92 % (23 1115)    Vital Signs Range (Last 24H):  Temp:  [97.8 °F (36.6 °C)-98.3 °F (36.8 °C)]   Pulse:  " [83-88]   Resp:  [18]   BP: ()/(50-63)   SpO2:  [92 %-97 %]       Intake/Output Summary (Last 24 hours) at 4/22/2023 1313  Last data filed at 4/22/2023 0600  Gross per 24 hour   Intake 1513.58 ml   Output 700 ml   Net 813.58 ml         Physical Exam:  General appearance: Well developed, well nourished. Lethargic  Eyes:  Conjunctivae/corneas clear. PERRL.  Lungs: Normal respiratory effort,   clear to auscultation bilaterally   Heart: Regular rate and rhythm, S1, S2 normal, no murmur, rub or melly.  Abdomen: Soft, non-tender non-distended; bowel sounds normal; no masses,  no organomegaly  Extremities: No cyanosis or clubbing. No edema.    Skin: Skin color, texture, turgor normal. No rashes or lesions  Neurologic: Normal strength and tone. No focal numbness or weakness   Right knee CDI      Laboratory:  Recent Labs   Lab 04/22/23  0423   WBC 10.06   RBC 3.79*   HGB 11.0*   HCT 34.6*      MCV 91   MCH 29.0   MCHC 31.8*     BMP: No results for input(s): GLU, NA, K, CL, CO2, BUN, CREATININE, CALCIUM, MG in the last 168 hours.    Invalid input(s):  PHOS  Lab Results   Component Value Date    CALCIUM 9.0 03/24/2023     BNP  No results for input(s): BNP, BNPTRIAGEBLO in the last 168 hours.No results found for: URICACIDNo results found for: IRON, TIBC, FERRITIN, SATURATEDIRO  Lab Results   Component Value Date    CALCIUM 9.0 03/24/2023       Diagnostic Results:  X-Ray Knee 1 or 2 View Right   Final Result      Please see above.         Electronically signed by: Radha Diego   Date:    04/21/2023   Time:    09:54          ASSESSMENT/PLAN:     Right Knee:  per ortho/therapy teams.    HTN:  BB only    DM:  metformin, ADA, SSI.  Recommend SGLT2 and GLP as outpt.      Depression:  meds reviewed and reordered.     MARYLIN:  doesn't wear at home but will order QHS here due to narcs/pain meds.     DVT:  ASA BID.    Lethargic today; possibly pain medication related. Monitor closely.    Thanks for consult  See above  Will  follow along.

## 2023-04-22 NOTE — PT/OT/SLP PROGRESS
Physical Therapy Treatment    Patient Name:  Beth Flynn   MRN:  [id number]    Recommendations:     Discharge Recommendations: home health PT, home health OT (progressing to OPPT)  Discharge Equipment Recommendations: bedside commode, walker, rolling  Barriers to discharge: None (though pt needs to be able to amb. Safely, no leg buckling)    Assessment:     Beth Flynn is a 67 y.o. female admitted with a medical diagnosis of Unilateral primary osteoarthritis, right knee.  She presents with the following impairments/functional limitations: weakness, impaired endurance, impaired self care skills, impaired functional mobility, gait instability, impaired balance, impaired cognition, decreased coordination, decreased lower extremity function, decreased safety awareness, pain, decreased ROM, impaired skin, edema, orthopedic precautions ;pt with poor mobility this AM, keeping eyes closed a lot during session, RLE buckling upon first step w/ RW, pt needing max. A to recover without falling.    Rehab Prognosis: Good; patient would benefit from acute skilled PT services to address these deficits and reach maximum level of function.    Recent Surgery: Procedure(s) (LRB):  ARTHROPLASTY, KNEE, TOTAL REPLACEMENT (Right) 1 Day Post-Op    Plan:     During this hospitalization, patient to be seen BID to address the identified rehab impairments via gait training, therapeutic activities, therapeutic exercises and progress toward the following goals:    Plan of Care Expires:  04/28/23    Subjective     Chief Complaint: pain and inability to use her leg  Patient/Family Comments/goals: upon arrival to room, pt stating she's not going home today, waiting to have surgery. Explained to pt that she has already had surgery. Pt still seeming confused, nsg. Notified.   Pain/Comfort:  Pain Rating 1: 10/10  Location - Side 1: Right  Location 1: knee  Pain Addressed 1: Reposition, Distraction, Pre-medicate for activity, Nurse  notified  Pain Rating Post-Intervention 1: 10/10      Objective:     Communicated with nurse prior to session.  Patient found up in chair with cryotherapy, peripheral IV upon PT entry to room. Pt eating breakfast.     General Precautions: Standard, fall  Orthopedic Precautions: RLE weight bearing as tolerated  Braces: N/A  Respiratory Status: Room air     Functional Mobility:  Transfers:     Sit to Stand:  contact guard assistance and minimum assistance with rolling walker  Gait: pt took 2 steps forward w/ RW and RLE buckled , req'd max.A to recover to prevent a fall.      AM-PAC 6 CLICK MOBILITY  Turning over in bed (including adjusting bedclothes, sheets and blankets)?: 3  Sitting down on and standing up from a chair with arms (e.g., wheelchair, bedside commode, etc.): 3  Moving from lying on back to sitting on the side of the bed?: 3  Moving to and from a bed to a chair (including a wheelchair)?: 3  Need to walk in hospital room?: 2  Climbing 3-5 steps with a railing?: 2  Basic Mobility Total Score: 16       Treatment & Education:  Pt too sleepy to perform ex's at this time.     Patient left up in chair with all lines intact, call button in reach, nurse notified, and cryotherapy to knee and LE's elevated ..    GOALS:   Multidisciplinary Problems       Physical Therapy Goals          Problem: Physical Therapy    Goal Priority Disciplines Outcome Goal Variances Interventions   Physical Therapy Goal     PT, PT/OT Ongoing, Progressing     Description: Goals to be met by: 23     Patient will increase functional independence with mobility by performin. Supine to sit with supervision.   2. Sit to supine with supervision.   3. Sit<>stand transfer with supervision using rolling walker.   4. Gait > 150 feet with SBA using rolling walker.   5. Ascend/descend 13 stairs with R handrails with SBA and RW.  6. Ascend/descend threshold stairs with no handrails with SBA and RW.                       Time Tracking:      PT Received On: 04/22/23  PT Start Time: 0914     PT Stop Time: 0930  PT Total Time (min): 16 min     Billable Minutes: Therapeutic Activity 16    Treatment Type: Treatment  PT/PTA: PTA     Number of PTA visits since last PT visit: 1     04/22/2023  Ret'd [id number]  Gt training  13 min.  Pt sitting up in chair, IV found out of her arm and thrown on bed, nsg notified, pt then stood w/ min.A and cueing for hand placement. Amb'd 12' around bed w/RW and min.A, MAX cueing for safety (pt pushing walker too far ahead of her), ret'd to bed w/ modA at LE's. Pt perf'd AAROM RLE ex's of AP's, SLR's, hip abd/add, heelslides x 10 ea. Pt w/ good knee ROM, ~90-95*, very little signs of pain. Pt left w/ SCD to LLE, cryotherapy to R knee, bed alarm on, nsg. Notified. Pt sleeping before leaving room.  4/22/2023

## 2023-04-23 LAB
ERYTHROCYTE [DISTWIDTH] IN BLOOD BY AUTOMATED COUNT: 12.3 % (ref 11.5–14.5)
HCT VFR BLD AUTO: 34.5 % (ref 37–48.5)
HGB BLD-MCNC: 11.4 G/DL (ref 12–16)
MCH RBC QN AUTO: 29.2 PG (ref 27–31)
MCHC RBC AUTO-ENTMCNC: 33 G/DL (ref 32–36)
MCV RBC AUTO: 89 FL (ref 82–98)
PLATELET # BLD AUTO: 226 K/UL (ref 150–450)
PMV BLD AUTO: 9.3 FL (ref 9.2–12.9)
POCT GLUCOSE: 133 MG/DL (ref 70–110)
POCT GLUCOSE: 186 MG/DL (ref 70–110)
RBC # BLD AUTO: 3.9 M/UL (ref 4–5.4)
WBC # BLD AUTO: 8 K/UL (ref 3.9–12.7)

## 2023-04-23 PROCEDURE — 97535 SELF CARE MNGMENT TRAINING: CPT

## 2023-04-23 PROCEDURE — 94761 N-INVAS EAR/PLS OXIMETRY MLT: CPT

## 2023-04-23 PROCEDURE — 25000003 PHARM REV CODE 250: Performed by: NURSE PRACTITIONER

## 2023-04-23 PROCEDURE — 94660 CPAP INITIATION&MGMT: CPT

## 2023-04-23 PROCEDURE — 97530 THERAPEUTIC ACTIVITIES: CPT

## 2023-04-23 PROCEDURE — 85027 COMPLETE CBC AUTOMATED: CPT | Performed by: ORTHOPAEDIC SURGERY

## 2023-04-23 PROCEDURE — 36415 COLL VENOUS BLD VENIPUNCTURE: CPT | Performed by: ORTHOPAEDIC SURGERY

## 2023-04-23 PROCEDURE — 97116 GAIT TRAINING THERAPY: CPT

## 2023-04-23 PROCEDURE — 99900035 HC TECH TIME PER 15 MIN (STAT)

## 2023-04-23 PROCEDURE — 25000003 PHARM REV CODE 250: Performed by: ORTHOPAEDIC SURGERY

## 2023-04-23 RX ADMIN — OXYCODONE HYDROCHLORIDE 10 MG: 5 TABLET ORAL at 08:04

## 2023-04-23 RX ADMIN — MONTELUKAST 10 MG: 10 TABLET, FILM COATED ORAL at 08:04

## 2023-04-23 RX ADMIN — ASPIRIN 81 MG CHEWABLE TABLET 81 MG: 81 TABLET CHEWABLE at 08:04

## 2023-04-23 RX ADMIN — OXYCODONE HYDROCHLORIDE 5 MG: 5 TABLET ORAL at 05:04

## 2023-04-23 RX ADMIN — SODIUM CHLORIDE: 9 INJECTION, SOLUTION INTRAVENOUS at 05:04

## 2023-04-23 RX ADMIN — SODIUM CHLORIDE: 9 INJECTION, SOLUTION INTRAVENOUS at 08:04

## 2023-04-23 RX ADMIN — FAMOTIDINE 20 MG: 20 TABLET ORAL at 08:04

## 2023-04-23 RX ADMIN — PROPRANOLOL HYDROCHLORIDE 40 MG: 10 TABLET ORAL at 09:04

## 2023-04-23 RX ADMIN — GABAPENTIN 300 MG: 300 CAPSULE ORAL at 02:04

## 2023-04-23 RX ADMIN — METFORMIN HYDROCHLORIDE 500 MG: 500 TABLET, FILM COATED ORAL at 08:04

## 2023-04-23 RX ADMIN — DONEPEZIL HYDROCHLORIDE 10 MG: 5 TABLET, FILM COATED ORAL at 08:04

## 2023-04-23 RX ADMIN — CELECOXIB 200 MG: 200 CAPSULE ORAL at 09:04

## 2023-04-23 RX ADMIN — DONEPEZIL HYDROCHLORIDE 10 MG: 5 TABLET, FILM COATED ORAL at 12:04

## 2023-04-23 RX ADMIN — MUPIROCIN 1 G: 20 OINTMENT TOPICAL at 08:04

## 2023-04-23 RX ADMIN — CELECOXIB 200 MG: 200 CAPSULE ORAL at 08:04

## 2023-04-23 RX ADMIN — METFORMIN HYDROCHLORIDE 500 MG: 500 TABLET, FILM COATED ORAL at 05:04

## 2023-04-23 RX ADMIN — TRAZODONE HYDROCHLORIDE 100 MG: 100 TABLET ORAL at 08:04

## 2023-04-23 RX ADMIN — DOCUSATE SODIUM 100 MG: 100 CAPSULE, LIQUID FILLED ORAL at 08:04

## 2023-04-23 RX ADMIN — DULOXETINE 60 MG: 30 CAPSULE, DELAYED RELEASE ORAL at 08:04

## 2023-04-23 RX ADMIN — BENZTROPINE MESYLATE 1 MG: 1 TABLET ORAL at 09:04

## 2023-04-23 RX ADMIN — BUSPIRONE HYDROCHLORIDE 10 MG: 10 TABLET ORAL at 08:04

## 2023-04-23 RX ADMIN — GABAPENTIN 300 MG: 300 CAPSULE ORAL at 08:04

## 2023-04-23 RX ADMIN — CETIRIZINE HYDROCHLORIDE 10 MG: 10 TABLET, FILM COATED ORAL at 08:04

## 2023-04-23 RX ADMIN — POLYETHYLENE GLYCOL 3350 17 G: 17 POWDER, FOR SOLUTION ORAL at 08:04

## 2023-04-23 RX ADMIN — ATORVASTATIN CALCIUM 40 MG: 20 TABLET, FILM COATED ORAL at 08:04

## 2023-04-23 RX ADMIN — PROPRANOLOL HYDROCHLORIDE 40 MG: 10 TABLET ORAL at 08:04

## 2023-04-23 NOTE — PROGRESS NOTES
Assessment/Plan:  Status Post right Total Knee Arthroplasty. Doing well postoperatively.   Plan discharge today after physical therapy     LOS: 0 days     Subjective:  Post-Operative Day: 2 Status Post right Total Knee Arthroplasty  Systemic or Specific Complaints:No Complaints      Objective:  Vital signs in last 24 hours:  Temp:  [96.8 °F (36 °C)-98.4 °F (36.9 °C)] 96.8 °F (36 °C)  Pulse:  [] 95  Resp:  [18-22] 22  SpO2:  [92 %-96 %] 96 %  BP: ()/(50-78) 140/72    General: alert, appears stated age, and cooperative   Wound: Wound Intact   Motion: Flexion: 5 to 50 Degrees   DVT Exam: No evidence of DVT seen on physical exam.     Data Review  CBC:   Lab Results   Component Value Date    WBC 8.00 04/23/2023    RBC 3.90 (L) 04/23/2023    HGB 11.4 (L) 04/23/2023    HCT 34.5 (L) 04/23/2023     04/23/2023

## 2023-04-23 NOTE — PROGRESS NOTES
"Consult Note  MED    Consult Requested By: Desmond Ontiveros MD  Reason for Consult: HTN/DM/OA/MARYLIN/Depression.     SUBJECTIVE:     History of Present Illness:  Patient is a 67 y.o. female presents with scheduled right knee repair.  Seen post op on floor. VSS.  Preop/Epic reviewed.  No CP/SOB/N/V/D/F/C.    Interval history: D/w PT; patient remains weak and unsteady when she works with them. Apparently does not have any help at home.    Past Medical History:   Diagnosis Date    Arthritis     Asthma     Depression     Diabetes mellitus     Elevated cholesterol     GERD (gastroesophageal reflux disease)     Hypertension     Seizures     only one    Sleep apnea     thinks she was told she has this, but does not have a cpap    Slow to wake up after anesthesia      Past Surgical History:   Procedure Laterality Date    BACK SURGERY       SECTION, LOW TRANSVERSE      CORONARY ANGIOPLASTY WITH STENT PLACEMENT      unsure about this procedure    HERNIA REPAIR      HYSTERECTOMY      TONSILLECTOMY       Family History   Problem Relation Age of Onset    Cancer Mother     Heart disease Father      Social History     Tobacco Use    Smoking status: Never    Smokeless tobacco: Never   Substance Use Topics    Alcohol use: No    Drug use: No       Review of patient's allergies indicates:   Allergen Reactions    Erythromycin Other (See Comments)     "dehydration"    Tuna oil Swelling    Morphine Anxiety    Phenergan [promethazine] Anxiety    Sulfa (sulfonamide antibiotics) Anxiety        Review of Systems:  Constitutional: No fever or chills  Respiratory: No cough or shortness of breath  Cardiovascular: No chest pain or palpitations  Gastrointestinal: No nausea or vomiting  Neurological: No confusion or weakness    OBJECTIVE:     Vital Signs (Most Recent)  Temp: 96.8 °F (36 °C) (23)  Pulse: 95 (23)  Resp: (!) 22 (23)  BP: (!) 140/72 (23)  SpO2: 96 % (23)    Vital Signs Range " (Last 24H):  Temp:  [96.8 °F (36 °C)-98.4 °F (36.9 °C)]   Pulse:  []   Resp:  [18-22]   BP: (103-144)/(58-78)   SpO2:  [93 %-96 %]       Intake/Output Summary (Last 24 hours) at 4/23/2023 1132  Last data filed at 4/23/2023 0255  Gross per 24 hour   Intake 720 ml   Output 3500 ml   Net -2780 ml         Physical Exam:  General appearance: Well developed, well nourished. Lethargic  Eyes:  Conjunctivae/corneas clear. PERRL.  Lungs: Normal respiratory effort,   clear to auscultation bilaterally   Heart: Regular rate and rhythm, S1, S2 normal, no murmur, rub or melly.  Abdomen: Soft, non-tender non-distended; bowel sounds normal; no masses,  no organomegaly  Extremities: No cyanosis or clubbing. No edema.    Skin: Skin color, texture, turgor normal. No rashes or lesions  Neurologic: Normal strength and tone. No focal numbness or weakness   Right knee CDI      Laboratory:  Recent Labs   Lab 04/23/23  0430   WBC 8.00   RBC 3.90*   HGB 11.4*   HCT 34.5*      MCV 89   MCH 29.2   MCHC 33.0       BMP: No results for input(s): GLU, NA, K, CL, CO2, BUN, CREATININE, CALCIUM, MG in the last 168 hours.    Invalid input(s):  PHOS  Lab Results   Component Value Date    CALCIUM 9.0 03/24/2023     BNP  No results for input(s): BNP, BNPTRIAGEBLO in the last 168 hours.No results found for: URICACIDNo results found for: IRON, TIBC, FERRITIN, SATURATEDIRO  Lab Results   Component Value Date    CALCIUM 9.0 03/24/2023       Diagnostic Results:  X-Ray Knee 1 or 2 View Right   Final Result      Please see above.         Electronically signed by: Radha Diego   Date:    04/21/2023   Time:    09:54          ASSESSMENT/PLAN:     Right Knee:  per ortho/therapy teams.    HTN:  BB only    DM:  metformin, ADA, SSI.  Recommend SGLT2 and GLP as outpt.      Depression:  meds reviewed and reordered.     MARYLIN:  doesn't wear at home but will order QHS here due to narcs/pain meds.     DVT:  ASA BID.    Remains weak and unsteady per PT and no  help at home. May need rehab placement?    Thanks for consult  See above  Will follow along.

## 2023-04-23 NOTE — PLAN OF CARE
Problem: Physical Therapy  Goal: Physical Therapy Goal  Description: Goals to be met by: 23     Patient will increase functional independence with mobility by performin. Supine to sit with supervision.   2. Sit to supine with supervision.   3. Sit<>stand transfer with supervision using rolling walker.   4. Gait > 150 feet with SBA using rolling walker.   5. Ascend/descend 13 stairs with R handrails with SBA and RW.  6. Ascend/descend threshold stairs with no handrails with SBA and RW.  Outcome: Ongoing, Progressing     Patient continues to be very confused and have extremely poor safety awareness. Patient requires moderate assist for supine to sitting EOB. Patient ambulated 70 feet with RW and CGA. Patient required minimal assist and max verbal cues for stairs and required the use of bilateral railing. Due to the patient's significantly impaired safety awareness the patient is at very high risk of falls if discharged home  and discharge recommendation has been changed to SNF.

## 2023-04-23 NOTE — PT/OT/SLP PROGRESS
Occupational Therapy   Treatment    Name: Beth Flynn  MRN: [id number]  Admitting Diagnosis:  Unilateral primary osteoarthritis, right knee  2 Days Post-Op    Recommendations:     Discharge Recommendations:  (TBD pending progress though anticipate need for home health OT/PT with 24/7 supervision/assist)  Discharge Equipment Recommendations:  bedside commode, bath bench, walker, rolling  Barriers to discharge:  Inaccessible home environment, Decreased caregiver support (current functional level)    Assessment:   Pt. Confused this day reporting name and knows she is in a hospital though unable to report name of hospital or reason for hospitalization.  Requires reminder to speak English as she switches between the 2 languages.  Ambulating short household distance with CGA for steadying/safety; once incident of knee buckling after walking approx. 10 feet though able to complete ambulation to bathroom and from bathroom toilet>bathroom doorway.  Step transfer BSC frame over toilet with CGA for steadying/safety and increased cues for safe hand placement.  Pt. With CGA for functional transfer though requiring increased safety cues for RW proximity and safe hand placement during transitions.  Toileting with CGA; voiding while seated at BSC, able to manage hospital gown and clean front stephanie region in stance with steadying assist.  Increased cuing needed for safe RW positioning as Pt. Attempting to discard RW just prior to reaching sink; requiring retrieval of needed items for hand hygiene.  MOD A for LB dress to don mesh underwear; Pt. With increased time attempting to thread BLE using downward reaching and initially attempting cross leg tech - instructed on avoiding this movement due to R-TKA; able to pull clothing to waist though with CGA for steadying due to impaired balance/stability.  Needs RW, BSC, and TTB.  Post acute OT needs TBD pending progress though anticipate need for home health OT/PT with 24/7  "supervision/assistance.  To benefit from continued acute care OT services to increase independence in self-care/functional transfers.  Continued POC.     Beth Flynn is a 67 y.o. female with a medical diagnosis of Unilateral primary osteoarthritis, right knee.  She presents with below deficits decreasing independence in self-care/functional transfers. Performance deficits affecting function are weakness, impaired endurance, impaired self care skills, impaired functional mobility, gait instability, decreased lower extremity function, decreased upper extremity function, impaired balance, decreased safety awareness, pain, decreased ROM, orthopedic precautions, impaired skin.     Rehab Prognosis:  Good; patient would benefit from acute skilled OT services to address these deficits and reach maximum level of function.       Plan:     Patient to be seen daily to address the above listed problems via self-care/home management, therapeutic activities, therapeutic exercises  Plan of Care Expires: 04/29/23  Plan of Care Reviewed with: patient    Subjective     Chief Complaint: With c/o RLE-knee pain.   Patient/Family Comments/goals: No goals stated at this time.   Pain/Comfort:  Pain Rating 1:  (Unable to rate pain, through reports "hurts alot" with activity)  Location - Side 1: Right  Location - Orientation 1: generalized  Location 1: knee  Pain Addressed 1: Reposition, Distraction, Cessation of Activity, Nurse notified  Pain Rating Post-Intervention 1:  (states, "hurts a little" at rest)    Objective:     Communicated with: Shawna Bravo RN prior to session.  Patient found up in chair with peripheral IV, chair check (MARTINA hose in place) upon OT entry to room.    General Precautions: Standard, fall    Orthopedic Precautions:RLE weight bearing as tolerated  Braces: N/A  Respiratory Status: Room air     Occupational Performance:     Functional Mobility/Transfers:  Sit>stand EOB>RW CGA  Steadying/assist " needed  Increased cues for safe hand placement and RLE positioning  Follow through with hand placement though not for RLE positioning  Ambulating bed>bathroom toilet>sink>bathroom doorway CGA  Using RW and requiring steadying assist due to impaired balance  Forward flexed trunk noted   Pt. With incident of knee buckling assisted to bathroom though able to self-correct  Step transfer to BSC frame over toilet with RW and CGA for safety  Increased cues for maintaining RW proximity and for hand transitions with follow through after increased verbal/tactile cues    Activities of Daily Living:  Toileting with CGA   Needed for steadying  Cleaning front stephanie region in stance  Hand hygiene in stance with CGA  Needed for steadying  Increased cues for RW positioning at sink prior to task  Pt. Attempting to discard RW just prior to reaching sink  LB dress to don mesh panties with MOD A   Assist needed to thread BLE into clothing while seated after failed attempted downward reaching for task  Instructed on avoiding cross leg tech at RLE due to TKA  Able to pulling clothing to waist in stance with CGA for steadying      [facility] 6 Click ADL: 16    Treatment & Education:  Sit>stand EOB>RW CGA  Steadying/assist needed  Increased cues for safe hand placement and RLE positioning  Follow through with hand placement though not for RLE positioning  Ambulating bed>bathroom toilet>sink>bathroom doorway CGA  Using RW and requiring steadying assist due to impaired balance  Forward flexed trunk noted   Pt. With incident of knee buckling assisted to bathroom though able to self-correct  Step transfer to BSC frame over toilet with RW and CGA for safety  Increased cues for maintaining RW proximity and for hand transitions with follow through after increased verbal/tactile cues    Activities of Daily Living:  Toileting with CGA   Needed for steadying  Cleaning front stephanie region in stance  Hand hygiene in stance with CGA  Needed for  steadying  Increased cues for RW positioning at sink prior to task  Pt. Attempting to discard RW just prior to reaching sink  LB dress to don mesh panties with MOD A   Assist needed to thread BLE into clothing while seated after failed attempted downward reaching for task  Instructed on avoiding cross leg tech at RLE due to TKA  Able to pulling clothing to waist in stance with CGA for steadying  Received call light review and importance of calling for assist    Patient left up in chair with all lines intact, call button in reach, chair alarm on, and nursing notified    GOALS:   Multidisciplinary Problems       Occupational Therapy Goals          Problem: Occupational Therapy    Goal Priority Disciplines Outcome Interventions   Occupational Therapy Goal     OT, PT/OT Ongoing, Progressing    Description: Goals to be met by: 4/29/2023     Patient will increase functional independence with ADLs by performing:    UE Dressing with Supervision.  LE Dressing with Supervision.  Grooming while standing at sink with Supervision.  Toileting from bedside commode with Supervision for hygiene and clothing management.   Toilet transfer to bedside commode with Supervision.                         Time Tracking:     OT Date of Treatment: 04/23/23  OT Start Time: 1212  OT Stop Time: 1240  OT Total Time (min): 28 min    Billable Minutes:Self Care/Home Management 28    OT/DAVID: OT          4/23/2023

## 2023-04-23 NOTE — PLAN OF CARE
04/22/23 1346   VAUGHN Message   Medicare Outpatient and Observation Notification regarding financial responsibility Given to patient/caregiver;Explained to patient/caregiver;Signed/date by patient/caregiver   Date VAUGHN was signed 04/22/23   Time VAUGHN was signed 1000

## 2023-04-23 NOTE — PT/OT/SLP PROGRESS
"Physical Therapy Treatment    Patient Name:  Beth Flynn   MRN:  [id number]    Recommendations:     Discharge Recommendations: nursing facility, skilled  Discharge Equipment Recommendations: bedside commode, walker, rolling  Barriers to discharge: Inaccessible home, Decreased caregiver support, and impaired safety awareness     Assessment:     Beth Flynn is a 67 y.o. female admitted with a medical diagnosis of Unilateral primary osteoarthritis, right knee.  She presents with the following impairments/functional limitations: weakness, impaired endurance, impaired cognition, impaired self care skills, impaired functional mobility, decreased lower extremity function, gait instability, decreased safety awareness, impaired balance, impaired cardiopulmonary response to activity, pain .    Patient continues to be very confused and have extremely poor safety awareness. Patient requires moderate assist for supine to sitting EOB. Patient ambulated 70 feet with RW and CGA. Patient required minimal assist and max verbal cues for stairs and required the use of bilateral railing. Due to the patient's significantly impaired safety awareness the patient is at very high risk of falls if discharged home  and discharge recommendation has been changed to SNF.      Rehab Prognosis: Good; patient would benefit from acute skilled PT services to address these deficits and reach maximum level of function.    Recent Surgery: Procedure(s) (LRB):  ARTHROPLASTY, KNEE, TOTAL REPLACEMENT (Right) 2 Days Post-Op    Plan:     During this hospitalization, patient to be seen BID to address the identified rehab impairments via gait training, therapeutic exercises, therapeutic activities and progress toward the following goals:    Plan of Care Expires:  04/28/23    Subjective     Chief Complaint: "My daughter is here, its so nice to have her here" when asked where her daughter was she said sitting in the chair although there was no " one else in the room  Patient/Family Comments/goals: Patient agrees to participate in PT intervention.   Pain/Comfort:  Pain Rating 1: 5/10  Location - Side 1: Right  Location 1: knee  Pain Addressed 1: Reposition, Distraction, Cessation of Activity      Objective:     Communicated with nursing prior to session.  Patient found supine with peripheral IV upon PT entry to room.     General Precautions: Standard, fall  Orthopedic Precautions: RLE weight bearing as tolerated  Braces: N/A  Respiratory Status: Room air     Functional Mobility:  Bed Mobility:     Supine to Sit: moderate assistance  Transfers:     Sit to Stand:  contact guard assistance with rolling walker  Toilet Transfer: contact guard assistance with  rolling walker  using  Step Transfer  Gait: Patient ambulated 70 feet with the RW and CGA. Constant verbal cueing required for safe and proper use of the RW with very poor carry over.  Stairs:  Pt ascended/descended 4 stair(s) with No Assistive Device with bilateral handrails with Minimal Assistance.   Patient educated to ascend steps leading with the non surgical LE and to descend steps leading with the surgical LE. Patient instructed to ascend/ descend steps with step to gait pattern. Patient required maximal  verbal cues when ascending/ descending steps with poor carry over despite cues and demonstration.   Patient constantly tried to perform step through gait pattern despite instructions to have two feet on each step. Patient continuously attempts to lead with the L LE when stepping down despite cues.   Stairs attempted with only one rail and the patient is unable to perform   Patient reports significant fatigue following climbing 4 stairs       AM-PAC 6 CLICK MOBILITY  Turning over in bed (including adjusting bedclothes, sheets and blankets)?: 3  Sitting down on and standing up from a chair with arms (e.g., wheelchair, bedside commode, etc.): 3  Moving from lying on back to sitting on the side of the  bed?: 3  Moving to and from a bed to a chair (including a wheelchair)?: 3  Need to walk in hospital room?: 3  Climbing 3-5 steps with a railing?: 3  Basic Mobility Total Score: 18       Treatment & Education:  See above for gait and stair training     Patient required moderate assist for hygiene following bowel movement    Patient left up in chair with all lines intact, call button in reach, and chair alarm on..    GOALS:   Multidisciplinary Problems       Physical Therapy Goals          Problem: Physical Therapy    Goal Priority Disciplines Outcome Goal Variances Interventions   Physical Therapy Goal     PT, PT/OT Ongoing, Progressing     Description: Goals to be met by: 23     Patient will increase functional independence with mobility by performin. Supine to sit with supervision.   2. Sit to supine with supervision.   3. Sit<>stand transfer with supervision using rolling walker.   4. Gait > 150 feet with SBA using rolling walker.   5. Ascend/descend 13 stairs with R handrails with SBA and RW.  6. Ascend/descend threshold stairs with no handrails with SBA and RW.                       Time Tracking:     PT Received On: 23  PT Start Time: 1134     PT Stop Time: 1214  PT Total Time (min): 40 min     Billable Minutes: Gait Training 30 and Therapeutic Activity 10    Treatment Type: Treatment        Number of PTA visits since last PT visit: 0     2023

## 2023-04-24 LAB
POCT GLUCOSE: 143 MG/DL (ref 70–110)
POCT GLUCOSE: 152 MG/DL (ref 70–110)
POCT GLUCOSE: 167 MG/DL (ref 70–110)
POCT GLUCOSE: 180 MG/DL (ref 70–110)

## 2023-04-24 PROCEDURE — 25000003 PHARM REV CODE 250: Performed by: ORTHOPAEDIC SURGERY

## 2023-04-24 PROCEDURE — 97110 THERAPEUTIC EXERCISES: CPT

## 2023-04-24 PROCEDURE — 25000003 PHARM REV CODE 250: Performed by: NURSE PRACTITIONER

## 2023-04-24 PROCEDURE — 99900035 HC TECH TIME PER 15 MIN (STAT)

## 2023-04-24 PROCEDURE — 94660 CPAP INITIATION&MGMT: CPT

## 2023-04-24 PROCEDURE — 97535 SELF CARE MNGMENT TRAINING: CPT

## 2023-04-24 PROCEDURE — 97116 GAIT TRAINING THERAPY: CPT

## 2023-04-24 PROCEDURE — 94761 N-INVAS EAR/PLS OXIMETRY MLT: CPT

## 2023-04-24 RX ADMIN — DONEPEZIL HYDROCHLORIDE 10 MG: 5 TABLET, FILM COATED ORAL at 08:04

## 2023-04-24 RX ADMIN — CETIRIZINE HYDROCHLORIDE 10 MG: 10 TABLET, FILM COATED ORAL at 08:04

## 2023-04-24 RX ADMIN — GABAPENTIN 300 MG: 300 CAPSULE ORAL at 02:04

## 2023-04-24 RX ADMIN — MONTELUKAST 10 MG: 10 TABLET, FILM COATED ORAL at 08:04

## 2023-04-24 RX ADMIN — MUPIROCIN 1 G: 20 OINTMENT TOPICAL at 09:04

## 2023-04-24 RX ADMIN — ATORVASTATIN CALCIUM 40 MG: 20 TABLET, FILM COATED ORAL at 08:04

## 2023-04-24 RX ADMIN — BUSPIRONE HYDROCHLORIDE 10 MG: 10 TABLET ORAL at 08:04

## 2023-04-24 RX ADMIN — DOCUSATE SODIUM 100 MG: 100 CAPSULE, LIQUID FILLED ORAL at 09:04

## 2023-04-24 RX ADMIN — GABAPENTIN 300 MG: 300 CAPSULE ORAL at 08:04

## 2023-04-24 RX ADMIN — FAMOTIDINE 20 MG: 20 TABLET ORAL at 09:04

## 2023-04-24 RX ADMIN — MUPIROCIN 1 G: 20 OINTMENT TOPICAL at 08:04

## 2023-04-24 RX ADMIN — SODIUM CHLORIDE: 9 INJECTION, SOLUTION INTRAVENOUS at 02:04

## 2023-04-24 RX ADMIN — METFORMIN HYDROCHLORIDE 500 MG: 500 TABLET, FILM COATED ORAL at 09:04

## 2023-04-24 RX ADMIN — ASPIRIN 81 MG CHEWABLE TABLET 81 MG: 81 TABLET CHEWABLE at 09:04

## 2023-04-24 RX ADMIN — GABAPENTIN 300 MG: 300 CAPSULE ORAL at 09:04

## 2023-04-24 RX ADMIN — ASPIRIN 81 MG CHEWABLE TABLET 81 MG: 81 TABLET CHEWABLE at 08:04

## 2023-04-24 RX ADMIN — TRAZODONE HYDROCHLORIDE 100 MG: 100 TABLET ORAL at 08:04

## 2023-04-24 RX ADMIN — PROPRANOLOL HYDROCHLORIDE 40 MG: 10 TABLET ORAL at 08:04

## 2023-04-24 RX ADMIN — DOCUSATE SODIUM 100 MG: 100 CAPSULE, LIQUID FILLED ORAL at 08:04

## 2023-04-24 RX ADMIN — PROPRANOLOL HYDROCHLORIDE 40 MG: 10 TABLET ORAL at 09:04

## 2023-04-24 RX ADMIN — CELECOXIB 200 MG: 200 CAPSULE ORAL at 09:04

## 2023-04-24 RX ADMIN — FAMOTIDINE 20 MG: 20 TABLET ORAL at 08:04

## 2023-04-24 RX ADMIN — CELECOXIB 200 MG: 200 CAPSULE ORAL at 06:04

## 2023-04-24 RX ADMIN — METFORMIN HYDROCHLORIDE 500 MG: 500 TABLET, FILM COATED ORAL at 06:04

## 2023-04-24 RX ADMIN — OXYCODONE HYDROCHLORIDE 10 MG: 5 TABLET ORAL at 09:04

## 2023-04-24 RX ADMIN — DULOXETINE 60 MG: 30 CAPSULE, DELAYED RELEASE ORAL at 09:04

## 2023-04-24 RX ADMIN — OXYCODONE HYDROCHLORIDE 10 MG: 5 TABLET ORAL at 02:04

## 2023-04-24 RX ADMIN — BENZTROPINE MESYLATE 1 MG: 1 TABLET ORAL at 09:04

## 2023-04-24 RX ADMIN — POLYETHYLENE GLYCOL 3350 17 G: 17 POWDER, FOR SOLUTION ORAL at 09:04

## 2023-04-24 NOTE — PLAN OF CARE
04/24/23 1308   Post-Acute Status   Post-Acute Authorization Placement   Post-Acute Placement Status Pending payor review/awaiting authorization (if required)   Discharge Delays (!) Post-Acute Set-up   Discharge Plan   Discharge Plan A Skilled Nursing Facility   Discharge Plan B Home Health     Patient was accepted medically to [facility]. Auth was submitted

## 2023-04-24 NOTE — PLAN OF CARE
Patient forgetful. A&OX1-2 throughout shift. Pain managed with oxycodone PRN. Vitals stable. Tolerating Diet. Voiding. Bmx3. Ambulating with SBA and walker. IVF continuous.   Problem: Adult Inpatient Plan of Care  Goal: Plan of Care Review  Outcome: Ongoing, Progressing  Goal: Patient-Specific Goal (Individualized)  Outcome: Ongoing, Progressing  Goal: Absence of Hospital-Acquired Illness or Injury  Outcome: Ongoing, Progressing  Goal: Optimal Comfort and Wellbeing  Outcome: Ongoing, Progressing  Goal: Readiness for Transition of Care  Outcome: Ongoing, Progressing     Problem: Diabetes Comorbidity  Goal: Blood Glucose Level Within Targeted Range  Outcome: Ongoing, Progressing     Problem: Infection  Goal: Absence of Infection Signs and Symptoms  Outcome: Ongoing, Progressing     Problem: Fall Injury Risk  Goal: Absence of Fall and Fall-Related Injury  Outcome: Ongoing, Progressing

## 2023-04-24 NOTE — PLAN OF CARE
04/24/23 1037   Post-Acute Status   Post-Acute Authorization Placement   Post-Acute Placement Status Referrals Sent   Discharge Delays (!) Post-Acute Set-up   Discharge Plan   Discharge Plan A Skilled Nursing Facility   Discharge Plan B Home Health     [facility] sent patient to several skilled nursing facility.  Patient Locet & PASRR completed.     [facility]PH faxed  PASRR to [phone]

## 2023-04-24 NOTE — PROGRESS NOTES
"Consult Note  MED    Consult Requested By: Desmond Ontiveros MD  Reason for Consult: HTN/DM/OA/MARYLIN/Depression.     SUBJECTIVE:     History of Present Illness:  Patient is a 67 y.o. female presents with scheduled right knee repair.  Seen post op on floor. VSS.  Preop/Epic reviewed.  No CP/SOB/N/V/D/F/C.    Interval history:   events of AMS/confusion overnight.  Better this am.  "I don't know what happened".  VSS.  Still slow with therapy.  No CP/SOB/Calf pain.     Past Medical History:   Diagnosis Date    Arthritis     Asthma     Depression     Diabetes mellitus     Elevated cholesterol     GERD (gastroesophageal reflux disease)     Hypertension     Seizures     only one    Sleep apnea     thinks she was told she has this, but does not have a cpap    Slow to wake up after anesthesia      Past Surgical History:   Procedure Laterality Date    BACK SURGERY       SECTION, LOW TRANSVERSE      CORONARY ANGIOPLASTY WITH STENT PLACEMENT      unsure about this procedure    HERNIA REPAIR      HYSTERECTOMY      TONSILLECTOMY       Family History   Problem Relation Age of Onset    Cancer Mother     Heart disease Father      Social History     Tobacco Use    Smoking status: Never    Smokeless tobacco: Never   Substance Use Topics    Alcohol use: No    Drug use: No       Review of patient's allergies indicates:   Allergen Reactions    Erythromycin Other (See Comments)     "dehydration"    Tuna oil Swelling    Morphine Anxiety    Phenergan [promethazine] Anxiety    Sulfa (sulfonamide antibiotics) Anxiety        Review of Systems:  Constitutional: No fever or chills  Respiratory: No cough or shortness of breath  Cardiovascular: No chest pain or palpitations  Gastrointestinal: No nausea or vomiting  Neurological: No confusion or weakness    OBJECTIVE:     Vital Signs (Most Recent)  Temp: 98.1 °F (36.7 °C) (23)  Pulse: 89 (23)  Resp: 18 (23)  BP: 125/60 (23)  SpO2: 96 % (23 " 0917)    Vital Signs Range (Last 24H):  Temp:  [97.3 °F (36.3 °C)-100.1 °F (37.8 °C)]   Pulse:  [78-95]   Resp:  [16-19]   BP: ()/(55-70)   SpO2:  [92 %-98 %]       Intake/Output Summary (Last 24 hours) at 4/24/2023 0929  Last data filed at 4/24/2023 0630  Gross per 24 hour   Intake 750 ml   Output 900 ml   Net -150 ml         Physical Exam:  General appearance: Well developed, well nourished. Lethargic  Eyes:  Conjunctivae/corneas clear. PERRL.  Lungs: Normal respiratory effort,   clear to auscultation bilaterally   Heart: Regular rate and rhythm, S1, S2 normal, no murmur, rub or melly.  Abdomen: Soft, non-tender non-distended; bowel sounds normal; no masses,  no organomegaly  Extremities: No cyanosis or clubbing. No edema.    Skin: Skin color, texture, turgor normal. No rashes or lesions  Neurologic: Normal strength and tone. No focal numbness or weakness   Right knee CDI      Laboratory:  No results for input(s): WBC, RBC, HGB, HCT, PLT, MCV, MCH, MCHC in the last 24 hours.    BMP: No results for input(s): GLU, NA, K, CL, CO2, BUN, CREATININE, CALCIUM, MG in the last 168 hours.    Invalid input(s):  PHOS  Lab Results   Component Value Date    CALCIUM 9.0 03/24/2023     BNP  No results for input(s): BNP, BNPTRIAGEBLO in the last 168 hours.No results found for: URICACIDNo results found for: IRON, TIBC, FERRITIN, SATURATEDIRO  Lab Results   Component Value Date    CALCIUM 9.0 03/24/2023       Diagnostic Results:  X-Ray Knee 1 or 2 View Right   Final Result      Please see above.         Electronically signed by: Radha Diego   Date:    04/21/2023   Time:    09:54          ASSESSMENT/PLAN:     Right Knee:  per ortho/therapy teams.  Anticipating SNF/Rehab.      HTN:  BB only    DM:  metformin, ADA, SSI.  Recommend SGLT2 and GLP as outpt.      Depression:  meds reviewed and reordered.     MARYLIN:  doesn't wear at home but will order QHS here due to narcs/pain meds.     DVT:  ASA BID.    AMS:  sundowners vs other.   Seems resolved this am.  Keep an eye on it.      Thanks for consult  See above  Will follow along.

## 2023-04-24 NOTE — PT/OT/SLP PROGRESS
Occupational Therapy   Treatment    Name: Beth Flynn  MRN: [id number]  Admitting Diagnosis:  Unilateral primary osteoarthritis, right knee  3 Days Post-Op    Recommendations:     Discharge Recommendations: nursing facility, skilled  Discharge Equipment Recommendations:  bedside commode, bath bench, walker, rolling  Barriers to discharge:  Inaccessible home environment, Decreased caregiver support    Assessment:     Beth Flynn is a 67 y.o. female with a medical diagnosis of Unilateral primary osteoarthritis, right knee.  She presents with weakness, impaired endurance, impaired self care skills, impaired functional mobility, gait instability, impaired balance, decreased coordination, decreased lower extremity function, decreased safety awareness, pain, impaired cardiopulmonary response to activity, orthopedic precautions.     Pt would benefit from placement in a setting with 1-2 hours of therapy/day. Pt with environmental and caregivers barriers to dc home. Unsafe dc home at this time as patient has stairs and lives alone. She would benefit from post acute OT/PT to regain safety and independence with ADLs and functional mobility to prevent falls and readmission as well as improve quality of life and engagement in meaningful activities upon return home.     Rehab Prognosis:  Good; patient would benefit from acute skilled OT services to address these deficits and reach maximum level of function.       Plan:     Patient to be seen daily to address the above listed problems via self-care/home management, therapeutic activities, therapeutic exercises  Plan of Care Expires: 04/29/23  Plan of Care Reviewed with: patient    Subjective     Chief Complaint: pain in R knee  Patient/Family Comments/goals: to return to PLOF    Pain/Comfort:  Pain Rating 1: 4/10  Location - Side 1: Right  Location 1: knee  Pain Addressed 1: Reposition, Distraction, Cessation of Activity    Objective:     Communicated with: RN  prior to session.  Patient found HOB elevated with peripheral IV, SCD, cryotherapy upon OT entry to room.    General Precautions: Standard, fall    Orthopedic Precautions:RLE weight bearing as tolerated  Braces: N/A  Respiratory Status: Room air     Occupational Performance:     Pt confused throughout and speaking in English mostly but occasionally Syriac. Pt required frequent cues for safe mobility techniques.    Bed Mobility:    Supine to sitting: min A with max cues for sequencing/coordination of bed mobility tasks.     Functional Mobility/Transfers:  Sit <> stand: CGA with RW and cues for hand placement, especially during descent into chair. Pt would benefit from further safety retraining.   Toilet: CGA with RW and cues for use of grab bars as well as safe positioning before sitting on toilet.   Functional Mobility: Functional ambulation performed to improve activity tolerance for increased endurance and independence with ADLs as well as functional balance retraining for fall prevention.      Activities of Daily Living:  Toileting: SBA in sitting on toilet for perineal hygiene and min A for clothing management.   Grooming: CGA in standing at sink for hand hygiene.       [facility] 6 Click ADL: 16    Treatment & Education:  OT role, plan of care, progression of goals, importance of continued OOB activity, ADL/functional transfer and mobility retraining, discharge recommendation, call don't fall, safety precautions, fall prevention.     Patient left up in chair with all lines intact, call button in reach, chair alarm on, and RN notified    GOALS:   Multidisciplinary Problems       Occupational Therapy Goals          Problem: Occupational Therapy    Goal Priority Disciplines Outcome Interventions   Occupational Therapy Goal     OT, PT/OT Ongoing, Progressing    Description: Goals to be met by: 4/29/2023     Patient will increase functional independence with ADLs by performing:    UE Dressing with Supervision.  GARETH  Dressing with Supervision.  Grooming while standing at sink with Supervision.  Toileting from bedside commode with Supervision for hygiene and clothing management.   Toilet transfer to bedside commode with Supervision.                         Time Tracking:     OT Date of Treatment: 04/24/23  OT Start Time: 0912  OT Stop Time: 0936  OT Total Time (min): 24 min    Billable Minutes:Self Care/Home Management 24 minutes    OT/DAVID: OT          4/24/2023

## 2023-04-24 NOTE — PT/OT/SLP PROGRESS
Physical Therapy  Not Seen    Patient Name:  Beth Flynn   MRN:  [id number]    Patient not seen today secondary to Nursing care (lost IV, nursing getting new access). Will follow-up tomorrow, 4/25.

## 2023-04-24 NOTE — PROGRESS NOTES
Patient insurance is asking for peer to peer done for patient. Physician must call by tomorrow 12:00 pm. Call [phone] to set up peer to peer. Ref# [id number]      Patient [id number] is set up for 9:00 4/24/23. MD must call in and must speak with Dr.Marill eGorge must call Call [phone].     Ref# [id number]

## 2023-04-24 NOTE — PT/OT/SLP PROGRESS
Physical Therapy Treatment    Patient Name:  Beth Flynn   MRN:  [id number]    Recommendations:     Discharge Recommendations: nursing facility, skilled  Discharge Equipment Recommendations: bedside commode, walker, rolling  Barriers to discharge: Inaccessible home and Decreased caregiver support    Assessment:     Beth Flynn is a 67 y.o. female admitted with a medical diagnosis of Unilateral primary osteoarthritis, right knee. Pmhx significant for HTN, DM, back surgery.  She presents with the following impairments/functional limitations: weakness, impaired endurance, impaired cognition, impaired self care skills, impaired functional mobility, decreased lower extremity function, gait instability, decreased safety awareness, impaired balance, impaired cardiopulmonary response to activity, pain.    Patient continues to have difficulty with safe mobility d/t cognition requiring Ricco for all activity. Notably antalgic gait with ambulation with RW, but no overt knee buckling. Due to weekend events and continued increased risk for falls with inaccessible home (flight of stairs in home), dc rec to SNF.     Prior to admission pt was independent with mobility and self-care and there is expectation of returning to prior level of function to maintain independence avoiding readmission. Pt is at high risk of unplanned readmission due to fall risk and lack of 24 hour caregiver in prior setting.      Rehab Prognosis: Good; patient would benefit from acute skilled PT services to address these deficits and reach maximum level of function.    Recent Surgery: Procedure(s) (LRB):  ARTHROPLASTY, KNEE, TOTAL REPLACEMENT (Right) 3 Days Post-Op    Plan:     During this hospitalization, patient to be seen BID to address the identified rehab impairments via gait training, therapeutic activities, therapeutic exercises, neuromuscular re-education and progress toward the following goals:    Plan of Care Expires:   04/28/23    Subjective     Chief Complaint: Pain in knee increased with ambulation  Patient/Family Comments/goals: Goal to return home; Patient agreeable to PT treatment.  Pain/Comfort:  Pain Rating 1:  (unrated)  Location - Side 1: Right  Location 1: knee  Pain Addressed 1: Reposition, Distraction, Cessation of Activity, Nurse notified  Pain Rating Post-Intervention 1:  (increased with activity)      Objective:     Communicated with OT prior to session.  Patient found supine with peripheral IV, SCD, FCD, cryotherapy, bed alarm (carol sys) upon PT entry to room.     General Precautions: Standard, fall  Orthopedic Precautions: RLE weight bearing as tolerated  Braces: N/A  Respiratory Status: Room air     Patient donned non slip socks and gait belt for OOB mobility.     Functional Mobility:  Bed Mobility:     Sit<>Supine: minimal assistance  Transfers:     Sit<>stand: minimal assistance with rolling walker  Gait: x80 ft with RW and CGA - minimal assistance for navigation of room/obstacles  No overt LOB noted  Slight forward flexion of trunk noted with increased antalgic gait pattern and decreased stance time of RLE  X3 standing rest breaks during gait bout      AM-PAC 6 CLICK MOBILITY  Turning over in bed (including adjusting bedclothes, sheets and blankets)?: 3  Sitting down on and standing up from a chair with arms (e.g., wheelchair, bedside commode, etc.): 3  Moving from lying on back to sitting on the side of the bed?: 3  Moving to and from a bed to a chair (including a wheelchair)?: 3  Need to walk in hospital room?: 3  Climbing 3-5 steps with a railing?: 3  Basic Mobility Total Score: 18       Treatment & Education:  Pt performed supine therapeutic exercises including ankle pumps, quad sets, glute sets, SLR, SAQs, heel slides, abd/add x 10 reps with verbal and tactile cues.   Continued lethargy requiring continuous cues to maintain alert state  Extensor lag with SLR    Patient left HOB elevated with all lines  intact, call button in reach, bed alarm on, and RN notified..    GOALS:   Multidisciplinary Problems       Physical Therapy Goals          Problem: Physical Therapy    Goal Priority Disciplines Outcome Goal Variances Interventions   Physical Therapy Goal     PT, PT/OT Ongoing, Progressing     Description: Goals to be met by: 23     Patient will increase functional independence with mobility by performin. Supine to sit with supervision.   2. Sit to supine with supervision.   3. Sit<>stand transfer with supervision using rolling walker.   4. Gait > 150 feet with SBA using rolling walker.   5. Ascend/descend 13 stairs with R handrails with SBA and RW.  6. Ascend/descend threshold stairs with no handrails with SBA and RW.                       Time Tracking:     PT Received On: 23  PT Start Time: 1045     PT Stop Time: 1111  PT Total Time (min): 26 min     Billable Minutes: Gait Training 15 and Therapeutic Exercise 11    Treatment Type: Treatment  PT/PTA: PT     Number of PTA visits since last PT visit: 0     2023

## 2023-04-24 NOTE — NURSING
2030 Confused, impulsive, restless.  Attempts to climb over side rails.   Toileted X2.  Ripped off SCDs and polar care.  Pulling at IV.  Rubbing thighs, grabbing at knee.  Patient does not understand why she is having so much pain due to confusion.  Attempts to redirect unsuccessful.     2057  Pain medication given.    0056  Sleeping comfortably.  No signs of pain.  VS WNL.

## 2023-04-25 VITALS
TEMPERATURE: 99 F | SYSTOLIC BLOOD PRESSURE: 135 MMHG | RESPIRATION RATE: 17 BRPM | HEIGHT: 63 IN | HEART RATE: 86 BPM | BODY MASS INDEX: 29.89 KG/M2 | WEIGHT: 168.69 LBS | DIASTOLIC BLOOD PRESSURE: 68 MMHG | OXYGEN SATURATION: 95 %

## 2023-04-25 PROBLEM — M17.11 UNILATERAL PRIMARY OSTEOARTHRITIS, RIGHT KNEE: Status: RESOLVED | Noted: 2023-04-21 | Resolved: 2023-04-25

## 2023-04-25 LAB — POCT GLUCOSE: 148 MG/DL (ref 70–110)

## 2023-04-25 PROCEDURE — 94761 N-INVAS EAR/PLS OXIMETRY MLT: CPT

## 2023-04-25 PROCEDURE — 25000003 PHARM REV CODE 250: Performed by: NURSE PRACTITIONER

## 2023-04-25 PROCEDURE — 99900035 HC TECH TIME PER 15 MIN (STAT)

## 2023-04-25 PROCEDURE — 25000003 PHARM REV CODE 250: Performed by: ORTHOPAEDIC SURGERY

## 2023-04-25 PROCEDURE — 97535 SELF CARE MNGMENT TRAINING: CPT

## 2023-04-25 PROCEDURE — 97530 THERAPEUTIC ACTIVITIES: CPT

## 2023-04-25 PROCEDURE — 97116 GAIT TRAINING THERAPY: CPT

## 2023-04-25 RX ORDER — HYDROCODONE BITARTRATE AND ACETAMINOPHEN 5; 325 MG/1; MG/1
TABLET ORAL
Qty: 60 TABLET | Refills: 0 | Status: SHIPPED | OUTPATIENT
Start: 2023-04-25

## 2023-04-25 RX ORDER — ONDANSETRON 8 MG/1
8 TABLET, ORALLY DISINTEGRATING ORAL EVERY 8 HOURS PRN
Qty: 20 TABLET | Refills: 1 | Status: SHIPPED | OUTPATIENT
Start: 2023-04-25

## 2023-04-25 RX ORDER — NAPROXEN SODIUM 220 MG/1
81 TABLET, FILM COATED ORAL 2 TIMES DAILY
Qty: 60 TABLET | Refills: 0 | Status: SHIPPED | OUTPATIENT
Start: 2023-04-25

## 2023-04-25 RX ORDER — ONDANSETRON 8 MG/1
8 TABLET, ORALLY DISINTEGRATING ORAL EVERY 8 HOURS PRN
Qty: 20 TABLET | Refills: 1
Start: 2023-04-25 | End: 2023-04-25 | Stop reason: SDUPTHER

## 2023-04-25 RX ORDER — HYDROCODONE BITARTRATE AND ACETAMINOPHEN 5; 325 MG/1; MG/1
TABLET ORAL
Qty: 60 TABLET | Refills: 0
Start: 2023-04-25 | End: 2023-04-25 | Stop reason: SDUPTHER

## 2023-04-25 RX ADMIN — DOCUSATE SODIUM 100 MG: 100 CAPSULE, LIQUID FILLED ORAL at 08:04

## 2023-04-25 RX ADMIN — MUPIROCIN 1 G: 20 OINTMENT TOPICAL at 08:04

## 2023-04-25 RX ADMIN — CELECOXIB 200 MG: 200 CAPSULE ORAL at 08:04

## 2023-04-25 RX ADMIN — SODIUM CHLORIDE: 9 INJECTION, SOLUTION INTRAVENOUS at 04:04

## 2023-04-25 RX ADMIN — METFORMIN HYDROCHLORIDE 500 MG: 500 TABLET, FILM COATED ORAL at 08:04

## 2023-04-25 RX ADMIN — BENZTROPINE MESYLATE 1 MG: 1 TABLET ORAL at 08:04

## 2023-04-25 RX ADMIN — ASPIRIN 81 MG CHEWABLE TABLET 81 MG: 81 TABLET CHEWABLE at 08:04

## 2023-04-25 RX ADMIN — FAMOTIDINE 20 MG: 20 TABLET ORAL at 08:04

## 2023-04-25 RX ADMIN — PROPRANOLOL HYDROCHLORIDE 40 MG: 10 TABLET ORAL at 08:04

## 2023-04-25 RX ADMIN — GABAPENTIN 300 MG: 300 CAPSULE ORAL at 08:04

## 2023-04-25 RX ADMIN — DULOXETINE 60 MG: 30 CAPSULE, DELAYED RELEASE ORAL at 08:04

## 2023-04-25 NOTE — PLAN OF CARE
04/25/23 1010   Final Note   Assessment Type Final Discharge Note   Anticipated Discharge Disposition Home-Health   Hospital Resources/Appts/Education Provided Provided patient/caregiver with written discharge plan information;Appointments scheduled and added to AVS;Appointments scheduled by Navigator/Coordinator   Post-Acute Status   Post-Acute Authorization Home Health   Home Health Status Set-up Complete/Auth obtained   Patient choice form signed by patient/caregiver List with quality metrics by geographic area provided   Discharge Delays None known at this time     Patient will discharge home with home health. ([facility] )     Patient will be transported home by ADT 30 Wheelchair.      Patient reported having  BSC & RW at home from 5/2019 surgery.      Patient discharge needs have been addressed.      Patient granddaughter Leanne will be at home to assist with getting out of Wheelchair van.        Leanne [phone]

## 2023-04-25 NOTE — PLAN OF CARE
Patient documents faxed to [facility] third party of [facility]. Patient will be evaluated today in person by Jerman at 2:00pm.   Fax: [facility]---[phone]  Phone: [phone]

## 2023-04-25 NOTE — PT/OT/SLP PROGRESS
Physical Therapy Treatment    Patient Name:  Beth Flynn   MRN:  [id number]    Recommendations:     Discharge Recommendations: nursing facility, skilled  Discharge Equipment Recommendations: bedside commode, walker, rolling  Barriers to discharge: Inaccessible home and Decreased caregiver support    Assessment:     Beth Flynn is a 67 y.o. female admitted with a medical diagnosis of Unilateral primary osteoarthritis, right knee. Pmhx significant for HTN, DM, back surgery.  She presents with the following impairments/functional limitations: weakness, impaired endurance, impaired cognition, impaired self care skills, impaired functional mobility, decreased lower extremity function, gait instability, decreased safety awareness, impaired balance, impaired cardiopulmonary response to activity, pain.    Patient continues to have difficulty with safe mobility d/t cognition requiring cueing for safe use of RW and obstacle navigation. No gross knee buckling noted with ambulation but limited endurance. Due to weekend events and continued increased risk for falls with inaccessible home (flight of stairs in home), dc rec to SNF.     Prior to admission pt was independent with mobility and self-care and there is expectation of returning to prior level of function to maintain independence avoiding readmission. Pt is at high risk of unplanned readmission due to fall risk and lack of 24 hour caregiver in prior setting.      Rehab Prognosis: Good; patient would benefit from acute skilled PT services to address these deficits and reach maximum level of function.    Recent Surgery: Procedure(s) (LRB):  ARTHROPLASTY, KNEE, TOTAL REPLACEMENT (Right) 4 Days Post-Op    Plan:     During this hospitalization, patient to be seen BID to address the identified rehab impairments via gait training, therapeutic activities, therapeutic exercises, neuromuscular re-education and progress toward the following goals:    Plan of Care  "Expires:  04/28/23    Subjective     Chief Complaint: Needing to use restroom frequently d/t loose stools (RN and ortho pedro notified)  Patient/Family Comments/goals: Patient agreeable to PT treatment.  Pain/Comfort:  Pain Rating 1:  (unrated)  Location - Side 1: Right  Location 1: knee  Pain Addressed 1: Reposition, Distraction, Cessation of Activity  Pain Rating Post-Intervention 1:  (appears comfortable at rest)      Objective:     Communicated with OT prior to session.  Patient found up in chair with cryotherapy, (carol sys) upon PT entry to room.     General Precautions: Standard, fall  Orthopedic Precautions: RLE weight bearing as tolerated  Braces: N/A  Respiratory Status: Room air     Patient donned non slip socks and gait belt for OOB mobility.     Functional Mobility:  Transfers:     Sit<>stand: stand by assistance and supervision with rolling walker  Occasionally attempting to abandon RW early with stand>sit requiring cueing for safe RW management  3x from chair, 1x from toilet  Gait: 2x60 ft with RW and CGA - minimal assistance for navigation of room/obstacles  No overt LOB noted  Slight forward flexion of trunk noted with increased antalgic gait pattern and decreased stance time of RLE, improved from yesterday  Stairs:    Pt ascended/descended 4" curb step with Rolling Walker with no handrails with Stand-by Assistance.   Pt ascended/descended  2x4 stair(s) with Rolling Walker with right handrail with Minimal Assistance.   No carryover of RW management when ascending/descending stairs with device (pt reports no one at home to carry RW for her)  Initial attempt with PT demo-ing folding up RW to use as uni UE support device  Second attempt pt places RW on stairs with back posts off ground  Frequent cueing for sequencing of steps (attempts reciprocal pattern 3x during stair training)  Unable to perform teach back of step sequencing      AM-PAC 6 CLICK MOBILITY  Turning over in bed (including adjusting " bedclothes, sheets and blankets)?: 3  Sitting down on and standing up from a chair with arms (e.g., wheelchair, bedside commode, etc.): 3  Moving from lying on back to sitting on the side of the bed?: 3  Moving to and from a bed to a chair (including a wheelchair)?: 3  Need to walk in hospital room?: 3  Climbing 3-5 steps with a railing?: 3  Basic Mobility Total Score: 18       Treatment & Education:  Focus of session on home navigation and stairs - despite blocked practice, pt with difficulty with learning sequencing and safety with stairs    Patient left up in chair with call button in reach, chair alarm on, RN and Dayan notified, and carol sys present..    GOALS:   Multidisciplinary Problems       Physical Therapy Goals          Problem: Physical Therapy    Goal Priority Disciplines Outcome Goal Variances Interventions   Physical Therapy Goal     PT, PT/OT Ongoing, Progressing     Description: Goals to be met by: 23     Patient will increase functional independence with mobility by performin. Supine to sit with supervision.   2. Sit to supine with supervision.   3. Sit<>stand transfer with supervision using rolling walker. MET   4. Gait > 150 feet with SBA using rolling walker.   5. Ascend/descend 13 stairs with R handrails with SBA and RW.  6. Ascend/descend threshold stairs with no handrails with SBA and RW.                       Time Tracking:     PT Received On: 23  PT Start Time: 1014     PT Stop Time: 1044  PT Total Time (min): 30 min     Billable Minutes: Gait Training 20 and Therapeutic Activity 10    Treatment Type: Treatment  PT/PTA: PT     Number of PTA visits since last PT visit: 0     2023

## 2023-04-25 NOTE — PLAN OF CARE
Problem: Adult Inpatient Plan of Care  Goal: Plan of Care Review  Outcome: Ongoing, Progressing  Goal: Absence of Hospital-Acquired Illness or Injury  Outcome: Ongoing, Progressing  Goal: Optimal Comfort and Wellbeing  Outcome: Ongoing, Progressing     Problem: Diabetes Comorbidity  Goal: Blood Glucose Level Within Targeted Range  Outcome: Ongoing, Progressing     Problem: Infection  Goal: Absence of Infection Signs and Symptoms  Outcome: Ongoing, Progressing     Problem: Fall Injury Risk  Goal: Absence of Fall and Fall-Related Injury  Outcome: Ongoing, Progressing     Problem: Pain Acute  Goal: Acceptable Pain Control and Functional Ability  Outcome: Ongoing, Progressing   POC reviewed with pt who verbalized understanding. AAOx2. VSS on RA. Remains free of falls and injury. IVF infusing. Right knee incision C/D/I. Polar ice in use. TEDs/SCDs on.No complaints of pain or nausea. Up with assist to bedside commode. BG monitored at bedtime with no coverage needed per MAR. Resting well between care. Avasys in use, bed alarm on, call light in reach, and rounds made for safety. Will continue to monitor.

## 2023-04-25 NOTE — PROGRESS NOTES
Patient resting well on room air, BiPAP at bedside not in use, no signs of distress at this time, will continue to monitor

## 2023-04-25 NOTE — PROGRESS NOTES
"Progress Note  Orthopedics    Admit Date: 4/21/2023   Patient ID: Beth Flynn is a 67 y.o. female.  Postop day 4. Right total knee replacement.  Dressing dry neurovascularly intact.  Ambulated 80 ft with assistance.  Had a peer to peer this morning for skilled nursing which was denied against my recommendation.  We will make arrangements for discharge today.  Home health time 2 weeks.  Follow-up in 4 weeks.            Desmond PERDUE [facility]      Vital Sign (recent):  BP (!) 142/75   Pulse 90   Temp 97.9 °F (36.6 °C)   Resp 17   Ht 5' 3" (1.6 m)   Wt 76.5 kg (168 lb 11.5 oz)   SpO2 97%   Breastfeeding No   BMI 29.89 kg/m²       Laboratory:    CBC:   Recent Labs   Lab 04/23/23  0430   WBC 8.00   RBC 3.90*   HGB 11.4*   HCT 34.5*      MCV 89   MCH 29.2   MCHC 33.0       CMP: No results for input(s): GLU, CALCIUM, ALBUMIN, PROT, NA, K, CO2, CL, BUN, CREATININE, ALKPHOS, ALT, AST, BILITOT in the last 168 hours.        Intake/Output Summary (Last 24 hours) at 4/25/2023 0827  Last data filed at 4/25/2023 0453  Gross per 24 hour   Intake 1769.16 ml   Output --   Net 1769.16 ml         Current Medications:   aspirin  81 mg Oral BID    atorvastatin  40 mg Oral QHS    benztropine  1 mg Oral Daily    busPIRone  10 mg Oral QHS    celecoxib  200 mg Oral BID PC    cetirizine  10 mg Oral QHS    docusate sodium  100 mg Oral Q12H    donepeziL  10 mg Oral QHS    DULoxetine  60 mg Oral Daily    famotidine  20 mg Oral BID    gabapentin  300 mg Oral TID    metFORMIN  500 mg Oral BID WM    montelukast  10 mg Oral QHS    mupirocin  1 g Nasal BID    polyethylene glycol  17 g Oral Daily    propranoloL  40 mg Oral BID    traZODone  100 mg Oral Nightly       Continuous Infusions:   sodium chloride 0.9% 75 mL/hr at 04/25/23 0441     PRN Meds:.albuterol, dextrose 10%, dextrose 10%, dextrose, dextrose, glucagon (human recombinant), insulin aspart U-100, metoclopramide HCl, ondansetron, oxyCODONE, oxyCODONE, sodium chloride 0.9%, " sodium chloride 0.9%

## 2023-04-25 NOTE — PT/OT/SLP PROGRESS
Occupational Therapy   Treatment    Name: Beth Flynn  MRN: [id number]  Admitting Diagnosis:  Unilateral primary osteoarthritis, right knee  4 Days Post-Op    Recommendations:     Discharge Recommendations: nursing facility, skilled  Discharge Equipment Recommendations:  bedside commode, bath bench, walker, rolling  Barriers to discharge:  Inaccessible home environment, Decreased caregiver support    Assessment:     Beth Flynn is a 67 y.o. female with a medical diagnosis of Unilateral primary osteoarthritis, right knee.  She presents with  weakness, impaired endurance, impaired self care skills, impaired functional mobility, impaired balance, gait instability, impaired cognition, decreased coordination, decreased lower extremity function, decreased safety awareness, orthopedic precautions.     Rehab Prognosis:  Good; patient would benefit from acute skilled OT services to address these deficits and reach maximum level of function.       Plan:     Patient to be seen daily to address the above listed problems via self-care/home management, therapeutic activities, therapeutic exercises  Plan of Care Expires: 04/29/23  Plan of Care Reviewed with: patient    Subjective     Chief Complaint: watery bowel movement   Patient/Family Comments/goals: to get stronger    Pain/Comfort:  Pain Rating 1: other (see comments) (no verbal or nonverbal indicators of pain)    Objective:     Communicated with: RN prior to session.  Patient found up in chair with SCD, cryotherapy upon OT entry to room.    General Precautions: Standard, fall    Orthopedic Precautions:RLE weight bearing as tolerated  Braces: N/A  Respiratory Status: Room air     Occupational Performance:      Functional Mobility/Transfers:  Sit <> stand: SBA with RW and cues for hand placement and safe mobility techniques  Toilet: SBA and RW with cues for proper positioning of RW. Pt performed 2 toilet transfers this date 2/2 bowel urgency and watery  bowel movements x2.   Functional Mobility: Functional ambulation performed to improve activity tolerance for increased endurance and independence with ADLs as well as functional balance retraining for fall prevention.  Pt required CGA and RW for functional ambulation to/from bathroom x2    Activities of Daily Living:  Toileting: SBA and set up with cues for hygiene from anterior to posterior perineum  Grooming: CGA in standing at sink with cues for positioning of RW, tending to abandon to the side, during hand hygiene   UBD: min A for bra in standing and set up assist  LBD: SBA and set up assist in sitting/standing to don underwear/pants and shoes       AMPAC 6 Click ADL: 19    Treatment & Education:  OT role, plan of care, progression of goals, importance of continued OOB activity, ADL/functional transfer and mobility retraining, discharge recommendation, call don't fall, safety precautions, fall prevention.     Patient left up in chair with all lines intact, call button in reach, RN notified, and PT present    GOALS:   Multidisciplinary Problems       Occupational Therapy Goals          Problem: Occupational Therapy    Goal Priority Disciplines Outcome Interventions   Occupational Therapy Goal     OT, PT/OT Ongoing, Progressing    Description: Goals to be met by: 4/29/2023     Patient will increase functional independence with ADLs by performing:    UE Dressing with Supervision.  LE Dressing with Supervision.  Grooming while standing at sink with Supervision.  Toileting from bedside commode with Supervision for hygiene and clothing management.   Toilet transfer to bedside commode with Supervision.                         Time Tracking:     OT Date of Treatment: 04/25/23  OT Start Time: 0942  OT Stop Time: 1017  OT Total Time (min): 35 min    Billable Minutes:Self Care/Home Management 35 minutes    OT/DAVID: OT          4/25/2023

## 2023-04-25 NOTE — PLAN OF CARE
"[facility]   Department of Hospital Medicine  [address], [place] [id number] [phone] (phone)  [phone] (fax)      Facility Transfer Orders                        04/25/2023    Beth Flynn    Admit to: SNF    Diagnoses:  Active Hospital Problems   No active problems to display.      Resolved Hospital Problems    Diagnosis Date Resolved POA    *Unilateral primary osteoarthritis, right knee [M17.11] 04/25/2023 Yes       Allergies:  Review of patient's allergies indicates:   Allergen Reactions    Erythromycin Other (See Comments)     "dehydration"    Tuna oil Swelling    Morphine Anxiety    Phenergan [promethazine] Anxiety    Sulfa (sulfonamide antibiotics) Anxiety       Vitals:       Routine, Daily      Diet: regular    Activity:     - Up in a chair each morning as tolerated   - Ambulate with assistance to bathroom    - Weight bearing: FWB     Nursing Precautions:     - Fall precautions   - Maintain Posterior Hip Precautions for 6 weeks   - May ice incision for pain management and swelling    Wound Care:   - Change surgical dressing every other day and replace with island dressing    - Change dressing every 3 day and prn saturation.  -Staple removal 2 weeks post-op    CONSULTS:      PT to evaluate and treat - five times a week     OT to evaluate and treat - five times a week    Follow Up:   Follow-up Information       Desmond Ontiveros MD Follow up on 5/19/2023.    Specialty: Orthopedic Surgery  Why: Hospital follow up at 10am  Contact information:  [address] [facility]  [facility] [address]  [phone]               Jorge Alfred [facility] Follow up on 4/22/2023.    Why: Patient will be called by home health agency to schedule start time.  Contact information:  [address] [place] [id number] [phone]                              Medications: Discontinue all previous medication orders, if any. See new " list below.    Current Discharge Medication List        START taking these medications    Details   aspirin 81 MG Chew Take 1 tablet (81 mg total) by mouth 2 (two) times daily.  Qty: 60 tablet, Refills: 0      HYDROcodone-acetaminophen (NORCO) 5-325 mg per tablet 1 tablet every 4 hours PRN  or 2 tablets every 6 hours PRN  Qty: 60 tablet, Refills: 0    Comments: n/a       ondansetron (ZOFRAN-ODT) 8 MG TbDL Take 1 tablet (8 mg total) by mouth every 8 (eight) hours as needed (Nausea).  Qty: 20 tablet, Refills: 1           CONTINUE these medications which have NOT CHANGED    Details   ABILIFY MAINTENA 300 mg injection Inject 300 mg into the muscle every 28 days.      albuterol (PROVENTIL/VENTOLIN HFA) 90 mcg/actuation inhaler Inhale into the lungs.      benztropine (COGENTIN) 1 MG tablet Take 1 mg by mouth once daily.      !! busPIRone (BUSPAR) 10 MG tablet       !! busPIRone (BUSPAR) 5 MG Tab       donepezil (ARICEPT) 10 MG tablet Take 10 mg by mouth every evening.      duloxetine (CYMBALTA) 60 MG capsule Take 60 mg by mouth once daily.      gabapentin (NEURONTIN) 300 MG capsule Take 300 mg by mouth 3 (three) times daily.      loratadine (CLARITIN) 10 mg tablet Take 10 mg by mouth once daily.      metFORMIN (GLUCOPHAGE) 500 MG tablet Take 500 mg by mouth 2 (two) times daily with meals.      montelukast (SINGULAIR) 10 mg tablet       mv,Ca,min-folic acid-vit K1 (ONE-A-DAY WOMEN'S 50 PLUS) 400-20 mcg Tab Take 1 tablet by mouth once daily.      omeprazole 20 mg TbEC Take 1 tablet by mouth.      pantoprazole (PROTONIX) 20 MG tablet Take 1 tablet (20 mg total) by mouth once daily.  Qty: 30 tablet, Refills: 0      propranoloL (INDERAL LA) 120 MG 24 hr capsule Take 120 mg by mouth.      rosuvastatin (CRESTOR) 10 MG tablet       traZODone (DESYREL) 100 MG tablet Take 100 mg by mouth nightly.      benzonatate (TESSALON) 100 MG capsule Take by mouth.      MOVANTIK 25 mg tablet       ondansetron (ZOFRAN) 4 MG tablet Take 1 tablet  (4 mg total) by mouth every 6 (six) hours.  Qty: 12 tablet, Refills: 0      VITAMIN C 1000 MG tablet Take 1,000 mg by mouth.      vitamin D (VITAMIN D3) 1000 units Tab Take 1,000 Units by mouth.       !! - Potential duplicate medications found. Please discuss with provider.        STOP taking these medications       HYDROcodone-acetaminophen (NORCO)  mg per tablet Comments:   Reason for Stopping:              aspirin  81 mg Oral BID    atorvastatin  40 mg Oral QHS    benztropine  1 mg Oral Daily    busPIRone  10 mg Oral QHS    celecoxib  200 mg Oral BID PC    cetirizine  10 mg Oral QHS    docusate sodium  100 mg Oral Q12H    donepeziL  10 mg Oral QHS    DULoxetine  60 mg Oral Daily    famotidine  20 mg Oral BID    gabapentin  300 mg Oral TID    metFORMIN  500 mg Oral BID WM    montelukast  10 mg Oral QHS    mupirocin  1 g Nasal BID    polyethylene glycol  17 g Oral Daily    propranoloL  40 mg Oral BID    traZODone  100 mg Oral Nightly           ________________________________  Desmond Ontiveros MD  04/25/2023

## 2023-04-25 NOTE — PROGRESS NOTES
"Consult Note  MED    Consult Requested By: Desmond Ontiveros MD  Reason for Consult: HTN/DM/OA/MARYLIN/Depression.     SUBJECTIVE:     History of Present Illness:  Patient is a 67 y.o. female presents with scheduled right knee repair.  Seen post op on floor. VSS.  Preop/Epic reviewed.  No CP/SOB/N/V/D/F/C.    Interval history:     denied SNF for reasons unclear to me.  Oriented X4 this am.  Some mild language barriers at times.  Discussed with ortho at bedside.  No CP/SOB/Calf pain.     Past Medical History:   Diagnosis Date    Arthritis     Asthma     Depression     Diabetes mellitus     Elevated cholesterol     GERD (gastroesophageal reflux disease)     Hypertension     Seizures     only one    Sleep apnea     thinks she was told she has this, but does not have a cpap    Slow to wake up after anesthesia      Past Surgical History:   Procedure Laterality Date    BACK SURGERY       SECTION, LOW TRANSVERSE      CORONARY ANGIOPLASTY WITH STENT PLACEMENT      unsure about this procedure    HERNIA REPAIR      HYSTERECTOMY      TONSILLECTOMY      TOTAL KNEE ARTHROPLASTY Right 2023    Procedure: ARTHROPLASTY, KNEE, TOTAL REPLACEMENT;  Surgeon: Desmond Ontiveros MD;  [facility]: [facility];  Service: Orthopedics;  Laterality: Right;  OFIRMEV/ DNO AQUAMANTIS     Family History   Problem Relation Age of Onset    Cancer Mother     Heart disease Father      Social History     Tobacco Use    Smoking status: Never    Smokeless tobacco: Never   Substance Use Topics    Alcohol use: No    Drug use: No       Review of patient's allergies indicates:   Allergen Reactions    Erythromycin Other (See Comments)     "dehydration"    Tuna oil Swelling    Morphine Anxiety    Phenergan [promethazine] Anxiety    Sulfa (sulfonamide antibiotics) Anxiety        Review of Systems:  Constitutional: No fever or chills  Respiratory: No cough or shortness of breath  Cardiovascular: No chest pain or palpitations  Gastrointestinal: No nausea or " vomiting  Neurological: No confusion or weakness    OBJECTIVE:     Vital Signs (Most Recent)  Temp: 97.9 °F (36.6 °C) (04/25/23 0724)  Pulse: 90 (04/25/23 0724)  Resp: 17 (04/25/23 0724)  BP: (!) 142/75 (04/25/23 0724)  SpO2: 97 % (04/25/23 0757)    Vital Signs Range (Last 24H):  Temp:  [96.4 °F (35.8 °C)-98 °F (36.7 °C)]   Pulse:  [86-90]   Resp:  [17-18]   BP: (118-144)/(59-75)   SpO2:  [93 %-100 %]       Intake/Output Summary (Last 24 hours) at 4/25/2023 0941  Last data filed at 4/25/2023 0453  Gross per 24 hour   Intake 1769.16 ml   Output --   Net 1769.16 ml         Physical Exam:  General appearance: Well developed, well nourished.   Eyes:  Conjunctivae/corneas clear. PERRL.  Lungs: Normal respiratory effort,   clear to auscultation bilaterally   Heart: Regular rate and rhythm, S1, S2 normal, no murmur, rub or melly.  Abdomen: Soft, non-tender non-distended; bowel sounds normal; no masses,  no organomegaly  Extremities: No cyanosis or clubbing. No edema.    Skin: Skin color, texture, turgor normal. No rashes or lesions  Neurologic: Normal strength and tone. No focal numbness or weakness   Right knee CDI      Laboratory:  No results for input(s): WBC, RBC, HGB, HCT, PLT, MCV, MCH, MCHC in the last 24 hours.    BMP: No results for input(s): GLU, NA, K, CL, CO2, BUN, CREATININE, CALCIUM, MG in the last 168 hours.    Invalid input(s):  PHOS  Lab Results   Component Value Date    CALCIUM 9.0 03/24/2023     BNP  No results for input(s): BNP, BNPTRIAGEBLO in the last 168 hours.No results found for: URICACIDNo results found for: IRON, TIBC, FERRITIN, SATURATEDIRO  Lab Results   Component Value Date    CALCIUM 9.0 03/24/2023       Diagnostic Results:  X-Ray Knee 1 or 2 View Right   Final Result      Please see above.         Electronically signed by: Radha Diego   Date:    04/21/2023   Time:    09:54          ASSESSMENT/PLAN:     Right Knee:  per ortho/therapy teams.  Anticipating SNF/Rehab but denied.     HTN:  BB  only    DM:  metformin, ADA, SSI.  Recommend SGLT2 and GLP as outpt.      Depression:  meds reviewed and reordered.     MARYLIN:  doesn't wear at home but will order QHS here due to narcs/pain meds.     DVT:  ASA BID.    AMS:  sundowners vs other.  Seems resolved this am.  Keeping an eye on it.  Cutting down on narcs seems to have helped a lot.        As above

## 2023-04-25 NOTE — PLAN OF CARE
[facility]  [address]  [place] [address]  [phone]               [facility] [place]    Home Mikey         Expected Discharge Date: 4/25/23    Diagnoses:  Post-op  right knee(s) replacement    Patient is homebound due to:   Pt requires home health services due to taxing effort to leave the home as a result of immobility from Post-op right knee(s) replacement    Weight Bearing Status:   full weight bearing: FWB unless otherwise indicated.  Progress to cane as able.  Set up for outpatient PT as soon as able after staple removal once patient is MOD 1 with cane.    Physical Therapy:   3 times a week for 2 weeks (Call for further orders beyond 2 weeks)  - Ambulate with a rolling walker  - Progress to cane  - Instruct on ROM and strengthening of knee    Aide to provide assistance with personal care and  ADLs  2 times a week for 2 weeks    Wound Care:   Surgical dressing change:Starting on  post op day 2 then, 3 times per week and prn saturation.Change dressing while knee in flexed position utilizing island dressing or apply gauze and cover with tegaderm.  Teach patient to change daily if draining.  Pt may shower if surgical dressing is waterproof. Protect surgical dressing from getting wet by using press and seal saranwrap or garbage bag. Removal and replacement of dressing after shower only needed if incision is suspected  to have gotten wet during shower.  Otherwise change as previously described depending on dressing/drainage. No soaking in the tub or hot tub use for 6 weeks.    PT/SN to remove staples 14 days Post-op and apply skin prep and steri-strips.    Cold therapy/Ice: Encouraged at least 20 minutes 2-3 times daily or more if desired.  Incision must be kept waterproof while icing.  Wear TEDS Bilateral Thigh High Stockings for 3 weeks. Kenji hose x 3 weeks. Ok to remove kenji hose 1-2 hours/day max if desired.   If CPM ordered Utilize 2 hours in morning and 2  hours in evening. , Start at -5 degrees extension and 50 degree flexion. Increase flexion 10 degrees daily. Low post op mobility.       Contact:  Please contact Dr Desmond Ontiveros [phone] with concerns.        BLOOD THINNER:    If sent home on Xarelto         -14 days post-op for TKR       -30 days post-op for THR     If sent home home on ASA    81mg   BID x 4 weeks     Once finished with prescribed blood thinner, patients can return to pre-surgical ASA dosage if they took ASA before surgery.       Outpatient Therapy: [facility]    [address] [address]   or  [address] [place] [address]    Call [phone] to schedule appointment  Fax [phone]    If need orders: Call Arabella at Ext [phone]        DME:  - rolling Walker  - 3 in 1 commode.   - tub bench / shower chair  - Per PT/OT recommendation          Desmond Ontiveros

## 2023-04-25 NOTE — PLAN OF CARE
Patient is discharged via wheelchair van. Vitals stable. Peripheral IV removed. Medications delivered at bedside. AVS reviewed with patient. Belongings and polar Ice sent with patient. Surgical Incisions intact.  Problem: Adult Inpatient Plan of Care  Goal: Plan of Care Review  Outcome: Met  Goal: Patient-Specific Goal (Individualized)  Outcome: Met  Goal: Absence of Hospital-Acquired Illness or Injury  Outcome: Met  Goal: Optimal Comfort and Wellbeing  Outcome: Met  Goal: Readiness for Transition of Care  Outcome: Met     Problem: Diabetes Comorbidity  Goal: Blood Glucose Level Within Targeted Range  Outcome: Met     Problem: Infection  Goal: Absence of Infection Signs and Symptoms  Outcome: Met     Problem: Fall Injury Risk  Goal: Absence of Fall and Fall-Related Injury  Outcome: Met     Problem: Pain Acute  Goal: Acceptable Pain Control and Functional Ability  Outcome: Met

## 2023-04-26 NOTE — DISCHARGE SUMMARY
[facility]  Short Stay  Discharge Summary  TOTAL KNEE REPLACEMENT    Admit Date: 4/21/2023    Discharge Date and Time: 4/25/2023 12:29 PM      Discharge Attending Physician: Desmond Ontiveros MD    Hospital Course:  Beth Flynn,is a 67 y.o. female with severe osteoarthritis R knee, unrelieved with the conservative measures. The patient was admitted on  4/21/2023 and underwent R total knee replacement with computer-assisted navigation. Postoperatively, weightbearing as tolerated with a walker and CPM machine was initiated on postop day #1. Beth Flynn did quite well and was discharged on 4/22/2023  with home health physical therapy and nursing. The patient will be on Percocet for pain and aspirin 325 mg p.o. b.i.d. with meals x4 weeks.  A CPM machine will will be sent home with the patient. Postoperative follow up will be in the office in 4 weeks.       Final Diagnoses:    Principal Problem: Unilateral primary osteoarthritis, right knee   Secondary Diagnoses:   Active Hospital Problems   No active problems to display.      Resolved Hospital Problems    Diagnosis Date Resolved POA    *Unilateral primary osteoarthritis, right knee [M17.11] 04/25/2023 Yes       Discharged Condition: good    Disposition: [place]Health [place] [facility] [place]    Follow up/Patient Instructions:    Medications:  Reconciled Home Medications:      Medication List        START taking these medications      aspirin 81 MG Chew  Take 1 tablet (81 mg total) by mouth 2 (two) times daily.     HYDROcodone-acetaminophen 5-325 mg per tablet  Commonly known as: NORCO  Take 1 tablet by mouth every 4 hours as needed  or 2 tablets every 6 hours as needed  Replaces: HYDROcodone-acetaminophen  mg per tablet     ondansetron 8 MG Tbdl  Commonly known as: ZOFRAN-ODT  Take 1 tablet (8 mg total) by mouth every 8 (eight) hours as needed (Nausea).            CONTINUE taking these medications      ABILIFY MAINTENA 300 mg injection (pre-filled dual  chamber)  Generic drug: ARIPiprazole SERS  Inject 300 mg into the muscle every 28 days.     albuterol 90 mcg/actuation inhaler  Commonly known as: PROVENTIL/VENTOLIN HFA  Inhale into the lungs.     benzonatate 100 MG capsule  Commonly known as: TESSALON  Take by mouth.     benztropine 1 MG tablet  Commonly known as: COGENTIN  Take 1 mg by mouth once daily.     * busPIRone 10 MG tablet  Commonly known as: BUSPAR     * busPIRone 5 MG Tab  Commonly known as: BUSPAR     donepeziL 10 MG tablet  Commonly known as: ARICEPT  Take 10 mg by mouth every evening.     DULoxetine 60 MG capsule  Commonly known as: CYMBALTA  Take 60 mg by mouth once daily.     gabapentin 300 MG capsule  Commonly known as: NEURONTIN  Take 300 mg by mouth 3 (three) times daily.     loratadine 10 mg tablet  Commonly known as: CLARITIN  Take 10 mg by mouth once daily.     metFORMIN 500 MG tablet  Commonly known as: GLUCOPHAGE  Take 500 mg by mouth 2 (two) times daily with meals.     montelukast 10 mg tablet  Commonly known as: SINGULAIR     MOVANTIK 25 mg tablet  Generic drug: naloxegoL     omeprazole 20 mg Tbec  Take 1 tablet by mouth.     ondansetron 4 MG tablet  Commonly known as: ZOFRAN  Take 1 tablet (4 mg total) by mouth every 6 (six) hours.     ONE-A-DAY WOMEN'S 50 PLUS 400-20 mcg Tab  Generic drug: mv,Ca,min-folic acid-vit K1  Take 1 tablet by mouth once daily.     pantoprazole 20 MG tablet  Commonly known as: PROTONIX  Take 1 tablet (20 mg total) by mouth once daily.     propranoloL 120 MG 24 hr capsule  Commonly known as: INDERAL [place]  Take 120 mg by mouth.     rosuvastatin 10 MG tablet  Commonly known as: CRESTOR     traZODone 100 MG tablet  Commonly known as: DESYREL  Take 100 mg by mouth nightly.     VITAMIN C 1000 MG tablet  Generic drug: ascorbic acid (vitamin C)  Take 1,000 mg by mouth.     vitamin D 1000 units Tab  Commonly known as: VITAMIN D3  Take 1,000 Units by mouth.           * This list has 2 medication(s) that are the same as  "other medications prescribed for you. Read the directions carefully, and ask your doctor or other care provider to review them with you.                STOP taking these medications      HYDROcodone-acetaminophen  mg per tablet  Commonly known as: NORCO  Replaced by: HYDROcodone-acetaminophen 5-325 mg per tablet            Discharge Procedure Orders   WALKER FOR HOME USE     Order Specific Question Answer Comments   Type of Walker: Adult (5'4"-6'6")    With wheels? Yes    Height: 5' 3" (1.6 m)    Weight: 76.7 kg (169 lb)    Does patient have medical equipment at home? cane straight    Does patient have medical equipment at home? shower chair    Length of need (1-99 months): 99      HIP KIT FOR HOME USE     Order Specific Question Answer Comments   Height: 5' 3" (1.6 m)    Weight: 76.7 kg (169 lb)    Does patient have medical equipment at home? canpatty straight    Does patient have medical equipment at home? shower chair    Length of need (1-99 months): 99    Type: Short [place] Hip Kit      TRANSFER TUB BENCH FOR HOME USE     Order Specific Question Answer Comments   Type of Transfer Tub Bench: Unpadded    Height: 5' 3" (1.6 m)    Weight: 76.7 kg (169 lb)    Does patient have medical equipment at home? cane straight    Does patient have medical equipment at home? shower chair    Length of need (1-99 months): 99    Patient notified - Not covered by insurance considered a convenience item Yes    Discussed financial responsibility with responsible party Yes      BATH/SHOWER CHAIR FOR HOME USE     Order Specific Question Answer Comments   Height: 5' 3" (1.6 m)    Weight: 76.7 kg (169 lb)    Does patient have medical equipment at home? canpatty straight    Does patient have medical equipment at home? shower chair    Length of need (1-99 months): 99    Type: With back      3 IN 1 COMMODE FOR HOME USE     Order Specific Question Answer Comments   Type: Standard    Height: 5' 3" (1.6 m)    Weight: 76.5 kg (168 lb 11.5 oz)  "   Does patient have medical equipment at home? cane, quad    Does patient have medical equipment at home? shower chair    Length of need (1-99 months): 99      Change dressing (specify)   Order Comments: Dressing change to be performed by Home Health nurse.     Change dressing (specify)   Order Comments: Dressing change to be performed by Home Health nurse.     Change dressing (specify)   Order Comments: Dressing change to be performed by Home Health nurse.      Follow-up Information       Desmond Ontiveros MD Follow up on 5/19/2023.    Specialty: Orthopedic Surgery  Why: Hospital follow up at 10am  Contact information:  [address]  [facility]  [facility] [address]  [phone]               [facility] Follow up on 4/22/2023.    Why: Patient will be called by home health agency to schedule start time.  Contact information:  [address] [place] [id number] [phone]

## 2023-05-28 ENCOUNTER — HOSPITAL ENCOUNTER (EMERGENCY)
Facility: HOSPITAL | Age: 68
Discharge: HOME | End: 2023-05-28
Attending: EMERGENCY MEDICINE
Payer: MEDICARE

## 2023-05-28 VITALS
RESPIRATION RATE: 18 BRPM | WEIGHT: 153 LBS | SYSTOLIC BLOOD PRESSURE: 131 MMHG | DIASTOLIC BLOOD PRESSURE: 65 MMHG | HEART RATE: 102 BPM | TEMPERATURE: 98 F | OXYGEN SATURATION: 98 % | BODY MASS INDEX: 27.1 KG/M2

## 2023-05-28 DIAGNOSIS — M25.569 KNEE PAIN: ICD-10-CM

## 2023-05-28 DIAGNOSIS — G89.18 POST-OPERATIVE PAIN: Primary | ICD-10-CM

## 2023-05-28 LAB — POCT GLUCOSE: 137 MG/DL (ref 70–110)

## 2023-05-28 PROCEDURE — 99284 EMERGENCY DEPT VISIT MOD MDM: CPT | Mod: 25

## 2023-05-28 PROCEDURE — 36000 PLACE NEEDLE IN VEIN: CPT

## 2023-05-28 PROCEDURE — 82962 GLUCOSE BLOOD TEST: CPT

## 2023-05-28 PROCEDURE — 63600175 PHARM REV CODE 636 W HCPCS: Performed by: EMERGENCY MEDICINE

## 2023-05-28 PROCEDURE — 96372 THER/PROPH/DIAG INJ SC/IM: CPT | Performed by: EMERGENCY MEDICINE

## 2023-05-28 RX ORDER — KETOROLAC TROMETHAMINE 30 MG/ML
30 INJECTION, SOLUTION INTRAMUSCULAR; INTRAVENOUS
Status: COMPLETED | OUTPATIENT
Start: 2023-05-28 | End: 2023-05-28

## 2023-05-28 RX ORDER — PROCHLORPERAZINE EDISYLATE 5 MG/ML
10 INJECTION INTRAMUSCULAR; INTRAVENOUS
Status: COMPLETED | OUTPATIENT
Start: 2023-05-28 | End: 2023-05-28

## 2023-05-28 RX ADMIN — KETOROLAC TROMETHAMINE 30 MG: 30 INJECTION, SOLUTION INTRAMUSCULAR; INTRAVENOUS at 03:05

## 2023-05-28 RX ADMIN — PROCHLORPERAZINE EDISYLATE 10 MG: 5 INJECTION INTRAMUSCULAR; INTRAVENOUS at 03:05

## 2023-05-28 NOTE — ED PROVIDER NOTES
"Encounter Date: 2023       History     Chief Complaint   Patient presents with    Knee Pain     Pt had right total knee replacement 2 weeks ago, complains of increased swelling and pain to right knee x 3 days, walks with walker at baseline, + warm to touch      68 y/o F a little over 5 weeks s/p right knee replacement presents to the ER c/o right knee pain. Denies any trauma. Notes the swelling and pain seem a little worse, gradually worsening over the last 3 days. No other symptoms reported at this time.     Review of patient's allergies indicates:   Allergen Reactions    Erythromycin Other (See Comments)     "dehydration"    Tuna oil Swelling    Morphine Anxiety    Phenergan [promethazine] Anxiety    Sulfa (sulfonamide antibiotics) Anxiety     Past Medical History:   Diagnosis Date    Arthritis     Asthma     Depression     Diabetes mellitus     Elevated cholesterol     GERD (gastroesophageal reflux disease)     Hypertension     Seizures     only one    Sleep apnea     thinks she was told she has this, but does not have a cpap    Slow to wake up after anesthesia      Past Surgical History:   Procedure Laterality Date    BACK SURGERY       SECTION, LOW TRANSVERSE      CORONARY ANGIOPLASTY WITH STENT PLACEMENT      unsure about this procedure    HERNIA REPAIR      HYSTERECTOMY      TONSILLECTOMY      TOTAL KNEE ARTHROPLASTY Right 2023    Procedure: ARTHROPLASTY, KNEE, TOTAL REPLACEMENT;  Surgeon: Desmond Ontiveros MD;  Location: [facility];  Service: Orthopedics;  Laterality: Right;  OFIRMEV/ DNO AQUAMANTIS     Family History   Problem Relation Age of Onset    Cancer Mother     Heart disease Father      Social History     Tobacco Use    Smoking status: Never    Smokeless tobacco: Never   Substance Use Topics    Alcohol use: No    Drug use: No     Review of Systems   Constitutional:  Negative for chills and fever.   HENT:  Negative for congestion.    Respiratory:  Negative for cough and shortness of " breath.    Cardiovascular:  Negative for chest pain.   Gastrointestinal:  Negative for abdominal pain.   Musculoskeletal:  Negative for back pain.   Neurological:  Negative for light-headedness and headaches.     Physical Exam     Initial Vitals [05/28/23 1439]   BP Pulse Resp Temp SpO2   134/69 (!) 119 20 98 °F (36.7 °C) 98 %      MAP       --         Physical Exam    Nursing note and vitals reviewed.  Constitutional: She appears well-developed and well-nourished. No distress.   HENT:   Head: Normocephalic and atraumatic.   Eyes: Conjunctivae and EOM are normal. Pupils are equal, round, and reactive to light.   Neck: Neck supple. No tracheal deviation present.   Normal range of motion.  Cardiovascular:  Normal rate and intact distal pulses.           Pulmonary/Chest: No respiratory distress.   Musculoskeletal:         General: Tenderness present. No edema. Normal range of motion.      Cervical back: Normal range of motion and neck supple.      Comments: Mild, diffuse right knee; No erythema/induration/fluctuance, not warm to the touch, ROM intact albeit with pain     Neurological: She is alert and oriented to person, place, and time. She has normal strength. No cranial nerve deficit. GCS score is 15. GCS eye subscore is 4. GCS verbal subscore is 5. GCS motor subscore is 6.   Skin: Skin is warm and dry.       ED Course   Procedures  Labs Reviewed   POCT GLUCOSE - Abnormal; Notable for the following components:       Result Value    POCT Glucose 137 (*)     All other components within normal limits   POCT GLUCOSE MONITORING CONTINUOUS              X-Rays:   Independently Interpreted Readings:   Other Readings:  XR Right knee independently interpreted by me pending radiology review: No acute fracture or dislocation appreciated   Imaging Results              X-Ray Knee 3 View Right (Final result)  Result time 05/28/23 15:17:15      Final result by Claude Johnson MD (05/28/23 15:17:15)                   Impression:       1.  Postop changes of total right knee arthroplasty with soft tissue swelling of the right knee.    2.  No evidence of a fracture or dislocation.      Electronically signed by: Claude Johnson MD  Date:    05/28/2023  Time:    15:17               Narrative:    EXAMINATION:  XR KNEE 3 VIEW RIGHT    CLINICAL HISTORY:  Pain in unspecified knee    TECHNIQUE:  AP, lateral, and Merchant views of the right knee were performed.    COMPARISON:  04/21/2023.    FINDINGS:  There are postoperative changes of total right knee arthroplasty.  There has been interval removal of the skin staples.  The postoperative air has resolved.  There is soft tissue swelling of the right knee.    The joint spaces are maintained.  No periprosthetic fracture is identified.  No radiopaque foreign body is identified.                                      Medications   ketorolac injection 30 mg (30 mg Intramuscular Given 5/28/23 1501)   prochlorperazine injection Soln 10 mg (10 mg Intramuscular Given 5/28/23 1502)     Medical Decision Making:   History:   Old Medical Records: I decided to obtain old medical records.  Old Records Summarized: records from previous admission(s).       <> Summary of Records: Reviewed discharge summary after recent knee surgery documenting baseline medications and P<H  Initial Assessment:   66 y/o F presents to the ER c/o right knee pain, 5 weeks s/p knee replacement  Differential Diagnosis:   Post op pain, swelling, seroma, infection   Independently Interpreted Test(s):   I have ordered and independently interpreted X-rays - see prior notes.  Clinical Tests:   Lab Tests: Reviewed       <> Summary of Lab: CBG WNL  ED Management:  Patient given IM toradol and compazine with significant improvement in symptoms. VSS. Informed patient of results as well as plan to discharge with instructions on home mgmt, OTC medications, prompt f/u with ortho, strict return precautions given. Patient expressed good understanding and is  comfortable with discharge at this time.                         Clinical Impression:   Final diagnoses:  [M25.569] Knee pain  [G89.18] Post-operative pain (Primary)        ED Disposition Condition    Discharge Stable          ED Prescriptions    None       Follow-up Information       Follow up With Specialties Details Why Contact Info    Desmond Ontiveros MD Orthopedic Surgery Schedule an appointment as soon as possible for a visit   [address]  [facility]  [facility] [address]  [phone]               Nixon Mas MD  05/28/23 0447

## 2023-05-28 NOTE — DISCHARGE INSTRUCTIONS
I recommend taking ibuprofen 600mg every 6 hours with food in addition to the prescribed medication as needed for pain. You may want to apply ice to the knee 15 minutes on, 15 minutes off to help with pain and swelling. Please call your orthopedist tomorrow for a follow up appointment for further evaluation and management. Please return with any new or worsening symptoms.

## 2023-06-06 DIAGNOSIS — M25.561 RIGHT KNEE PAIN: Primary | ICD-10-CM

## 2023-06-16 ENCOUNTER — HOSPITAL ENCOUNTER (EMERGENCY)
Facility: OTHER | Age: 68
Discharge: HOME | End: 2023-06-16
Attending: EMERGENCY MEDICINE
Payer: MEDICARE

## 2023-06-16 VITALS
HEIGHT: 63 IN | DIASTOLIC BLOOD PRESSURE: 67 MMHG | BODY MASS INDEX: 30.12 KG/M2 | OXYGEN SATURATION: 100 % | TEMPERATURE: 98 F | SYSTOLIC BLOOD PRESSURE: 114 MMHG | HEART RATE: 63 BPM | RESPIRATION RATE: 18 BRPM | WEIGHT: 170 LBS

## 2023-06-16 DIAGNOSIS — G89.18 POST-OPERATIVE PAIN: Primary | ICD-10-CM

## 2023-06-16 LAB
ALBUMIN SERPL BCP-MCNC: 3.8 G/DL (ref 3.5–5.2)
ALP SERPL-CCNC: 75 U/L (ref 55–135)
ALT SERPL W/O P-5'-P-CCNC: 26 U/L (ref 10–44)
ANION GAP SERPL CALC-SCNC: 8 MMOL/L (ref 8–16)
AST SERPL-CCNC: 36 U/L (ref 10–40)
BASOPHILS # BLD AUTO: 0.04 K/UL (ref 0–0.2)
BASOPHILS NFR BLD: 0.5 % (ref 0–1.9)
BILIRUB SERPL-MCNC: 0.5 MG/DL (ref 0.1–1)
BUN SERPL-MCNC: 18 MG/DL (ref 8–23)
CALCIUM SERPL-MCNC: 9.7 MG/DL (ref 8.7–10.5)
CHLORIDE SERPL-SCNC: 103 MMOL/L (ref 95–110)
CO2 SERPL-SCNC: 27 MMOL/L (ref 23–29)
CREAT SERPL-MCNC: 0.9 MG/DL (ref 0.5–1.4)
DIFFERENTIAL METHOD: ABNORMAL
EOSINOPHIL # BLD AUTO: 0.2 K/UL (ref 0–0.5)
EOSINOPHIL NFR BLD: 2.1 % (ref 0–8)
ERYTHROCYTE [DISTWIDTH] IN BLOOD BY AUTOMATED COUNT: 12.6 % (ref 11.5–14.5)
EST. GFR  (NO RACE VARIABLE): >60 ML/MIN/1.73 M^2
GLUCOSE SERPL-MCNC: 258 MG/DL (ref 70–110)
HCT VFR BLD AUTO: 40.1 % (ref 37–48.5)
HGB BLD-MCNC: 13.3 G/DL (ref 12–16)
IMM GRANULOCYTES # BLD AUTO: 0.04 K/UL (ref 0–0.04)
IMM GRANULOCYTES NFR BLD AUTO: 0.5 % (ref 0–0.5)
LYMPHOCYTES # BLD AUTO: 2.6 K/UL (ref 1–4.8)
LYMPHOCYTES NFR BLD: 30.5 % (ref 18–48)
MCH RBC QN AUTO: 30.3 PG (ref 27–31)
MCHC RBC AUTO-ENTMCNC: 33.2 G/DL (ref 32–36)
MCV RBC AUTO: 91 FL (ref 82–98)
MONOCYTES # BLD AUTO: 0.5 K/UL (ref 0.3–1)
MONOCYTES NFR BLD: 6.4 % (ref 4–15)
NEUTROPHILS # BLD AUTO: 5.1 K/UL (ref 1.8–7.7)
NEUTROPHILS NFR BLD: 60 % (ref 38–73)
NRBC BLD-RTO: 0 /100 WBC
PLATELET # BLD AUTO: 296 K/UL (ref 150–450)
PMV BLD AUTO: 8.9 FL (ref 9.2–12.9)
POTASSIUM SERPL-SCNC: 4.2 MMOL/L (ref 3.5–5.1)
PROT SERPL-MCNC: 7.2 G/DL (ref 6–8.4)
RBC # BLD AUTO: 4.39 M/UL (ref 4–5.4)
SODIUM SERPL-SCNC: 138 MMOL/L (ref 136–145)
WBC # BLD AUTO: 8.48 K/UL (ref 3.9–12.7)

## 2023-06-16 PROCEDURE — 99284 EMERGENCY DEPT VISIT MOD MDM: CPT

## 2023-06-16 PROCEDURE — 85025 COMPLETE CBC W/AUTO DIFF WBC: CPT | Performed by: PHYSICIAN ASSISTANT

## 2023-06-16 PROCEDURE — 25000003 PHARM REV CODE 250: Performed by: PHYSICIAN ASSISTANT

## 2023-06-16 PROCEDURE — 96372 THER/PROPH/DIAG INJ SC/IM: CPT | Performed by: PHYSICIAN ASSISTANT

## 2023-06-16 PROCEDURE — 80053 COMPREHEN METABOLIC PANEL: CPT | Performed by: PHYSICIAN ASSISTANT

## 2023-06-16 PROCEDURE — 63600175 PHARM REV CODE 636 W HCPCS: Performed by: PHYSICIAN ASSISTANT

## 2023-06-16 RX ORDER — DICLOFENAC SODIUM 10 MG/G
2 GEL TOPICAL 4 TIMES DAILY
Status: DISCONTINUED | OUTPATIENT
Start: 2023-06-16 | End: 2023-06-16 | Stop reason: HOSPADM

## 2023-06-16 RX ORDER — KETOROLAC TROMETHAMINE 10 MG/1
10 TABLET, FILM COATED ORAL EVERY 6 HOURS
Qty: 12 TABLET | Refills: 0 | Status: SHIPPED | OUTPATIENT
Start: 2023-06-16 | End: 2023-06-19

## 2023-06-16 RX ORDER — PANTOPRAZOLE SODIUM 40 MG/1
40 TABLET, DELAYED RELEASE ORAL DAILY
Status: DISCONTINUED | OUTPATIENT
Start: 2023-06-16 | End: 2023-06-16 | Stop reason: HOSPADM

## 2023-06-16 RX ORDER — KETOROLAC TROMETHAMINE 30 MG/ML
10 INJECTION, SOLUTION INTRAMUSCULAR; INTRAVENOUS
Status: COMPLETED | OUTPATIENT
Start: 2023-06-16 | End: 2023-06-16

## 2023-06-16 RX ORDER — LIDOCAINE 50 MG/G
1 PATCH TOPICAL
Status: DISCONTINUED | OUTPATIENT
Start: 2023-06-16 | End: 2023-06-16 | Stop reason: HOSPADM

## 2023-06-16 RX ADMIN — PANTOPRAZOLE SODIUM 40 MG: 40 TABLET, DELAYED RELEASE ORAL at 12:06

## 2023-06-16 RX ADMIN — DICLOFENAC SODIUM TOPICAL GEL, 1%, 2 G: 10 GEL TOPICAL at 12:06

## 2023-06-16 RX ADMIN — KETOROLAC TROMETHAMINE 10 MG: 30 INJECTION, SOLUTION INTRAMUSCULAR; INTRAVENOUS at 12:06

## 2023-06-16 RX ADMIN — LIDOCAINE 1 PATCH: 50 PATCH CUTANEOUS at 12:06

## 2023-06-16 NOTE — ED TRIAGE NOTES
S/p right knee replacement on 5/28 with c/o post-op pain and swelling. States pain is constant, worse with weight bearing. + low grade fever reported at home. No drainage reported. Presents in no distress.

## 2023-06-16 NOTE — FIRST PROVIDER EVALUATION
" Emergency Department TeleTriage Encounter Note      CHIEF COMPLAINT    Chief Complaint   Patient presents with    Knee Pain     Reports right knee pain x5 days, recent knee sx 3 weeks ago. Denies any falls.        VITAL SIGNS   Initial Vitals [06/16/23 1123]   BP Pulse Resp Temp SpO2   104/62 82 16 98 °F (36.7 °C) 96 %      MAP       --            ALLERGIES    Review of patient's allergies indicates:   Allergen Reactions    Erythromycin Other (See Comments)     "dehydration"    Tuna oil Swelling    Morphine Anxiety    Phenergan [promethazine] Anxiety    Sulfa (sulfonamide antibiotics) Anxiety       PROVIDER TRIAGE NOTE  Patient presents with complaint of swelling to the knee with associated bruising. She reports associated redness and warmth and tenderness in the calf. She reports no fever. English is not patient's first language and I am unsure if this is concerning for DVT or not, will defer further imaging to examining provider.      Phy:   Constitutional: well nourished, well developed, appearing stated age, NAD   HEENT: NCAT, symmetrical lids, No obvious facial deformity.  Normal phonation. Normal Conjunctiva   Neck: NAROM   Respiratory: Normal effort.  No obvious use of accessory muscles   Musculoskeletal: Moved upper extremities well   Neuro: Alert, answers questions appropriately    Psych: appropriate mood and affect      Initial orders will be placed and care will be transferred to an alternate provider when patient is roomed for a full evaluation. Any additional orders and the final disposition will be determined by that provider.        ORDERS  Labs Reviewed   CBC W/ AUTO DIFFERENTIAL   COMPREHENSIVE METABOLIC PANEL       ED Orders (720h ago, onward)      Start Ordered     Status Ordering Provider    06/16/23 1131 06/16/23 1130  CBC auto differential  STAT         Ordered ENDY REEVES    06/16/23 1131 06/16/23 1130  Comprehensive metabolic panel  STAT         Ordered JANELLE SAHNI, " ENDY    06/16/23 1131 06/16/23 1130  Saline lock IV  Once         Ordered ENDY REEVES              Virtual Visit Note: The provider triage portion of this emergency department evaluation and documentation was performed via [email or web address], a HIPAA-compliant telemedicine application, in concert with a tele-presenter in the room. A face to face patient evaluation with one of my colleagues will occur once the patient is placed in an emergency department room.      DISCLAIMER: This note was prepared with [employer] voice recognition transcription software. Garbled syntax, mangled pronouns, and other bizarre constructions may be attributed to that software system.

## 2023-06-17 NOTE — ED PROVIDER NOTES
"  Source of History:  Patient     Chief complaint:  Knee Pain (Reports right knee pain x5 days, recent knee sx 3 weeks ago. Denies any falls. )      HPI:  Beth Flynn is a 67 y.o. female presenting with continued right knee pain.  Patient is status post TKR 3 weeks ago completed by Dr. Perkins.  She denies any new falls or injury.  States that she is had pain since this time.  She does know that pain has gradually increased over the past 1 week.  States that she began PTA proximally 2 weeks ago.  States that she had increased pain after PT session 2 days ago.  She is achieving some relief in pain with scheduled hydrocodone and meloxicam at home however when medication wears off pain returns.  She reported some subjective fever and chills last night.  Denies any skin changes to the area, erythema or drainage from the site.  Does report some pain radiation to the posterior knee.  She denies any numbness, tingling, chest pain or shortness of breath.  She attempted to follow up with her surgeon however the elevator was out and she stated that she could not complete the stairs with assistance of a walker.    This is the extent to the patients complaints today here in the emergency department.    ROS: As per HPI and below:  Constitutional: No fever.  No chills.  Eyes: No visual changes.   ENT: No sore throat. No ear pain.  Urinary: No abnormal urination.  MSK: right knee pain  Integument: No rashes or lesions.    Review of patient's allergies indicates:   Allergen Reactions    Erythromycin Other (See Comments)     "dehydration"    Tuna oil Swelling    Morphine Anxiety    Phenergan [promethazine] Anxiety    Sulfa (sulfonamide antibiotics) Anxiety       PMH:  As per HPI and below:  Past Medical History:   Diagnosis Date    Arthritis     Asthma     Depression     Diabetes mellitus     Elevated cholesterol     GERD (gastroesophageal reflux disease)     Hypertension     Seizures     only one    Sleep apnea     " "thinks she was told she has this, but does not have a cpap    Slow to wake up after anesthesia      Past Surgical History:   Procedure Laterality Date    BACK SURGERY       SECTION, LOW TRANSVERSE      CORONARY ANGIOPLASTY WITH STENT PLACEMENT      unsure about this procedure    HERNIA REPAIR      HYSTERECTOMY      TONSILLECTOMY      TOTAL KNEE ARTHROPLASTY Right 2023    Procedure: ARTHROPLASTY, KNEE, TOTAL REPLACEMENT;  Surgeon: Desmond Ontiveros MD;  Location: [facility];  Service: Orthopedics;  Laterality: Right;  OFIRMEV/ DNO AQUAMANTIS       Social History     Tobacco Use    Smoking status: Never    Smokeless tobacco: Never   Substance Use Topics    Alcohol use: No    Drug use: No       Physical Exam:    /67 (BP Location: Left arm, Patient Position: Sitting)   Pulse 63   Temp 98 °F (36.7 °C) (Oral)   Resp 18   Ht 5' 3" (1.6 m)   Wt 77.1 kg (170 lb)   SpO2 100%   BMI 30.11 kg/m²   Nursing note and vital signs reviewed.  Constitutional: No acute distress.  Eyes: No conjunctival injection.  Extraocular muscles are intact.  ENT: Normal phonation.  Musculoskeletal:  Well-healed surgical incision scar noted to right knee.  Mild ecchymosis to the knee.  Slight edema.  No erythema, no warmth.  Fair passive flexion and extension with no obvious bony deformity.  Neurovascularly intact.  No calf tenderness on exam.  No palpable cord.  Negative Homans sign.  Skin: No rashes seen.  Good turgor.  No abrasions.    Psych: Appropriate, conversant.        I decided to obtain the patient's medical records.    Results for orders placed or performed during the hospital encounter of 23   CBC auto differential   Result Value Ref Range    WBC 8.48 3.90 - 12.70 K/uL    RBC 4.39 4.00 - 5.40 M/uL    Hemoglobin 13.3 12.0 - 16.0 g/dL    Hematocrit 40.1 37.0 - 48.5 %    MCV 91 82 - 98 fL    MCH 30.3 27.0 - 31.0 pg    MCHC 33.2 32.0 - 36.0 g/dL    RDW 12.6 11.5 - 14.5 %    Platelets 296 150 - 450 K/uL    MPV 8.9 " (L) 9.2 - 12.9 fL    Immature Granulocytes 0.5 0.0 - 0.5 %    Gran # (ANC) 5.1 1.8 - 7.7 K/uL    Immature Grans (Abs) 0.04 0.00 - 0.04 K/uL    Lymph # 2.6 1.0 - 4.8 K/uL    Mono # 0.5 0.3 - 1.0 K/uL    Eos # 0.2 0.0 - 0.5 K/uL    Baso # 0.04 0.00 - 0.20 K/uL    nRBC 0 0 /100 WBC    Gran % 60.0 38.0 - 73.0 %    Lymph % 30.5 18.0 - 48.0 %    Mono % 6.4 4.0 - 15.0 %    Eosinophil % 2.1 0.0 - 8.0 %    Basophil % 0.5 0.0 - 1.9 %    Differential Method Automated    Comprehensive metabolic panel   Result Value Ref Range    Sodium 138 136 - 145 mmol/L    Potassium 4.2 3.5 - 5.1 mmol/L    Chloride 103 95 - 110 mmol/L    CO2 27 23 - 29 mmol/L    Glucose 258 (H) 70 - 110 mg/dL    BUN 18 8 - 23 mg/dL    Creatinine 0.9 0.5 - 1.4 mg/dL    Calcium 9.7 8.7 - 10.5 mg/dL    Total Protein 7.2 6.0 - 8.4 g/dL    Albumin 3.8 3.5 - 5.2 g/dL    Total Bilirubin 0.5 0.1 - 1.0 mg/dL    Alkaline Phosphatase 75 55 - 135 U/L    AST 36 10 - 40 U/L    ALT 26 10 - 44 U/L    Anion Gap 8 8 - 16 mmol/L    eGFR >60 >60 mL/min/1.73 m^2     Imaging Results    None         MDM:    67 y.o. female with knee pain.  Physical exam reveals patient well appearing in no distress at rest.  She does ambulate with the assistance of a walker.  Antalgic gait. l:  Well-healed surgical incision scar noted to right knee.  Mild ecchymosis to the knee.  Slight edema.  No erythema, no warmth.  Fair passive flexion and extension with no obvious bony deformity.  Neurovascularly intact.  No calf tenderness on exam.  No palpable cord.  Negative Homans sign.    DDX: fracture, sprain, dislocation, postoperative pain, septic joint, cellulitis, chronic pain    ED management:  Patient seen in tele triage process were initial labs were ordered although upon further discussion low suspicion of true fever as she is afebrile here has no findings to suggest infectious process of the knee.  CBC and CMP grossly unremarkable.  Did review outpatient x-ray completed several days ago for  similar complaint with no signs of hardware failure.  As she has had no new injury did not feel we needed to repeat this.  She did report some modest improvement after medicines here.  She states that she has some acid reflux symptoms secondary to medications.  Did discuss with her orthopedic who agrees with sending home with Toradol and will follow-up with patient.  Also encouraged patient to call her pain management specialist as this is the 2nd ER visit for what I suspect to be postoperative pain.    Impression/Plan: The encounter diagnosis was Post-operative pain. Patient will follow up with Primary or orthopedic.  Patient cautioned on when to return to ED.  Pt. Understands and agrees with current treatment plan           Diagnostic Impression:    1. Post-operative pain         ED Disposition Condition    Discharge Stable            ED Prescriptions       Medication Sig Dispense Start Date End Date Auth. Provider    ketorolac (TORADOL) 10 mg tablet Take 1 tablet (10 mg total) by mouth every 6 (six) hours. for 3 days 12 tablet 6/16/2023 6/19/2023 MALINDA Cotton          Follow-up Information       Follow up With Specialties Details Why Contact Info    Desmond Ontiveros MD Orthopedic Surgery Schedule an appointment as soon as possible for a visit   [address]  [facility]  [facility] [address]  [phone]      Pain management specialist  Schedule an appointment as soon as possible for a visit               Please pardon typos or dictation errors, as this note was transcribed using M*Modal fluency direct dictation software.      MALINDA Cotton  06/16/23 2012

## (undated) DEVICE — UNDERGLOVES BIOGEL PI SIZE 8

## (undated) DEVICE — BLADE SAG DUAL 18MMX1.27MMX90M

## (undated) DEVICE — KIT TOTAL HIP OPTIVAC

## (undated) DEVICE — PAD CAST SPECIALIST STRL 6

## (undated) DEVICE — SPONGE LAP 18X18 PREWASHED

## (undated) DEVICE — GLOVE BIOGEL SKINSENSE PI 7.5

## (undated) DEVICE — BANDAGE MATRIX HK LOOP 6IN 5YD

## (undated) DEVICE — UNDERGLOVES BIOGEL PI SZ 7 LF

## (undated) DEVICE — TOURNIQUET SB QC DP 34X4IN

## (undated) DEVICE — SOL IRR SOD CHL .9% POUR

## (undated) DEVICE — BLANKET HYPER ADULT 24X60IN

## (undated) DEVICE — MASK FLYTE HOOD PEEL AWAY

## (undated) DEVICE — Device

## (undated) DEVICE — UNDERGLOVES BIOGEL PI SIZE 8.5

## (undated) DEVICE — DRAPE EXTREMITY ORTHOMAX

## (undated) DEVICE — CONTAINER SPEC OR STRL 4.5OZ

## (undated) DEVICE — SUT ETHIBOND EXCEL 2 V37 30

## (undated) DEVICE — BNDG COFLEX FOAM LF2 ST 6X5YD

## (undated) DEVICE — SPONGE COTTON TRAY 4X4IN

## (undated) DEVICE — NDL 21GA X1 1/2 REG BEVEL

## (undated) DEVICE — TOWEL OR DISP STRL BLUE 4/PK

## (undated) DEVICE — SOL NACL .9% 250ML INJ

## (undated) DEVICE — APPLICATOR CHLORAPREP ORN 26ML

## (undated) DEVICE — ELECTRODE REM PLYHSV RETURN 9

## (undated) DEVICE — GLOVE BIOGEL SKINSENSE PI 7.0

## (undated) DEVICE — PAD COLD THERAPY KNEE WRAP ON

## (undated) DEVICE — DRESSING XEROFORM NONADH 1X8IN

## (undated) DEVICE — SUT STRATAFIX PDS 1 CTX 18IN

## (undated) DEVICE — PAD UNDERPAD 30X30

## (undated) DEVICE — PULSAVAC ZIMMER

## (undated) DEVICE — WAND WEREWOLF COBLATION 50

## (undated) DEVICE — PRESSURIZER BN CEMENT NOZZLE

## (undated) DEVICE — SOL NACL IRR 3000ML

## (undated) DEVICE — SEALER BIPOLAR TISSUE 6.0

## (undated) DEVICE — SUT VICRYL+ 1 CTX 18IN VIOL

## (undated) DEVICE — STAPLER SKIN PROXIMATE WIDE

## (undated) DEVICE — TRAY CATH FOL SIL URIMTR 16FR

## (undated) DEVICE — BANDAGE ESMARK ELASTIC ST 6X9

## (undated) DEVICE — TIP YANKAUERS BULB NO VENT

## (undated) DEVICE — ALCOHOL ISOPROPYL BLU 70% 16OZ

## (undated) DEVICE — COVER HD BACK TABLE 6FT

## (undated) DEVICE — DRAPE STERI INSTRUMENT 1018

## (undated) DEVICE — SYR 0.9% NACL 10ML STERILE

## (undated) DEVICE — SUT VICRYL PLUS 2-0 CT1 18

## (undated) DEVICE — GLOVE BIOGEL SKINSENSE PI 8.5

## (undated) DEVICE — WEREWOLF FASTSEAL 6.0

## (undated) DEVICE — BLADE RMR PATELLA 38MM W/ PILO